# Patient Record
Sex: FEMALE | Race: OTHER | NOT HISPANIC OR LATINO
[De-identification: names, ages, dates, MRNs, and addresses within clinical notes are randomized per-mention and may not be internally consistent; named-entity substitution may affect disease eponyms.]

---

## 2018-03-13 ENCOUNTER — MESSAGE (OUTPATIENT)
Age: 68
End: 2018-03-13

## 2018-10-09 ENCOUNTER — RESULT REVIEW (OUTPATIENT)
Age: 68
End: 2018-10-09

## 2018-10-09 ENCOUNTER — INPATIENT (INPATIENT)
Facility: HOSPITAL | Age: 68
LOS: 10 days | Discharge: HOSPICE | DRG: 853 | End: 2018-10-20
Attending: INTERNAL MEDICINE | Admitting: HOSPITALIST
Payer: MEDICARE

## 2018-10-09 VITALS
DIASTOLIC BLOOD PRESSURE: 87 MMHG | WEIGHT: 199.96 LBS | HEART RATE: 106 BPM | RESPIRATION RATE: 16 BRPM | SYSTOLIC BLOOD PRESSURE: 108 MMHG | OXYGEN SATURATION: 94 % | HEIGHT: 69 IN | TEMPERATURE: 98 F

## 2018-10-09 DIAGNOSIS — R13.10 DYSPHAGIA, UNSPECIFIED: ICD-10-CM

## 2018-10-09 DIAGNOSIS — E87.2 ACIDOSIS: ICD-10-CM

## 2018-10-09 DIAGNOSIS — A41.9 SEPSIS, UNSPECIFIED ORGANISM: ICD-10-CM

## 2018-10-09 DIAGNOSIS — S82.899A OTHER FRACTURE OF UNSPECIFIED LOWER LEG, INITIAL ENCOUNTER FOR CLOSED FRACTURE: Chronic | ICD-10-CM

## 2018-10-09 DIAGNOSIS — N17.9 ACUTE KIDNEY FAILURE, UNSPECIFIED: ICD-10-CM

## 2018-10-09 DIAGNOSIS — M33.20 POLYMYOSITIS, ORGAN INVOLVEMENT UNSPECIFIED: ICD-10-CM

## 2018-10-09 DIAGNOSIS — Z29.9 ENCOUNTER FOR PROPHYLACTIC MEASURES, UNSPECIFIED: ICD-10-CM

## 2018-10-09 DIAGNOSIS — S80.12XS CONTUSION OF LEFT LOWER LEG, SEQUELA: ICD-10-CM

## 2018-10-09 DIAGNOSIS — Z98.890 OTHER SPECIFIED POSTPROCEDURAL STATES: Chronic | ICD-10-CM

## 2018-10-09 DIAGNOSIS — E87.1 HYPO-OSMOLALITY AND HYPONATREMIA: ICD-10-CM

## 2018-10-09 DIAGNOSIS — L03.90 CELLULITIS, UNSPECIFIED: ICD-10-CM

## 2018-10-09 LAB
ALBUMIN SERPL ELPH-MCNC: 4.2 G/DL — SIGNIFICANT CHANGE UP (ref 3.3–5)
ALP SERPL-CCNC: 70 U/L — SIGNIFICANT CHANGE UP (ref 40–120)
ALT FLD-CCNC: 17 U/L — SIGNIFICANT CHANGE UP (ref 10–45)
ANION GAP SERPL CALC-SCNC: 27 MMOL/L — HIGH (ref 5–17)
APPEARANCE UR: ABNORMAL
AST SERPL-CCNC: 10 U/L — SIGNIFICANT CHANGE UP (ref 10–40)
BASOPHILS # BLD AUTO: 0 K/UL — SIGNIFICANT CHANGE UP (ref 0–0.2)
BASOPHILS NFR BLD AUTO: 0.2 % — SIGNIFICANT CHANGE UP (ref 0–2)
BILIRUB SERPL-MCNC: 0.7 MG/DL — SIGNIFICANT CHANGE UP (ref 0.2–1.2)
BILIRUB UR-MCNC: NEGATIVE — SIGNIFICANT CHANGE UP
BUN SERPL-MCNC: 138 MG/DL — HIGH (ref 7–23)
CALCIUM SERPL-MCNC: 9.8 MG/DL — SIGNIFICANT CHANGE UP (ref 8.4–10.5)
CHLORIDE SERPL-SCNC: 83 MMOL/L — LOW (ref 96–108)
CK SERPL-CCNC: 51 U/L — SIGNIFICANT CHANGE UP (ref 25–170)
CO2 SERPL-SCNC: 18 MMOL/L — LOW (ref 22–31)
COLOR SPEC: YELLOW — SIGNIFICANT CHANGE UP
CREAT ?TM UR-MCNC: 116 MG/DL — SIGNIFICANT CHANGE UP
CREAT SERPL-MCNC: 3.57 MG/DL — HIGH (ref 0.5–1.3)
DIFF PNL FLD: ABNORMAL
EOSINOPHIL # BLD AUTO: 0 K/UL — SIGNIFICANT CHANGE UP (ref 0–0.5)
EOSINOPHIL NFR BLD AUTO: 0.2 % — SIGNIFICANT CHANGE UP (ref 0–6)
GAS PNL BLDV: SIGNIFICANT CHANGE UP
GLUCOSE SERPL-MCNC: 174 MG/DL — HIGH (ref 70–99)
GLUCOSE UR QL: NEGATIVE — SIGNIFICANT CHANGE UP
HCT VFR BLD CALC: 50.7 % — HIGH (ref 34.5–45)
HGB BLD-MCNC: 17.8 G/DL — HIGH (ref 11.5–15.5)
KETONES UR-MCNC: NEGATIVE — SIGNIFICANT CHANGE UP
LACTATE BLDV-MCNC: 2.2 MMOL/L — HIGH (ref 0.7–2)
LEUKOCYTE ESTERASE UR-ACNC: ABNORMAL
LYMPHOCYTES # BLD AUTO: 1.1 K/UL — SIGNIFICANT CHANGE UP (ref 1–3.3)
LYMPHOCYTES # BLD AUTO: 4.9 % — LOW (ref 13–44)
MCHC RBC-ENTMCNC: 31.4 PG — SIGNIFICANT CHANGE UP (ref 27–34)
MCHC RBC-ENTMCNC: 35.1 GM/DL — SIGNIFICANT CHANGE UP (ref 32–36)
MCV RBC AUTO: 89.7 FL — SIGNIFICANT CHANGE UP (ref 80–100)
MONOCYTES # BLD AUTO: 1.2 K/UL — HIGH (ref 0–0.9)
MONOCYTES NFR BLD AUTO: 5.2 % — SIGNIFICANT CHANGE UP (ref 2–14)
NEUTROPHILS # BLD AUTO: 21.1 K/UL — HIGH (ref 1.8–7.4)
NEUTROPHILS NFR BLD AUTO: 89.6 % — HIGH (ref 43–77)
NITRITE UR-MCNC: NEGATIVE — SIGNIFICANT CHANGE UP
OSMOLALITY UR: 453 MOS/KG — SIGNIFICANT CHANGE UP (ref 300–900)
PH UR: 6 — SIGNIFICANT CHANGE UP (ref 5–8)
PLATELET # BLD AUTO: 285 K/UL — SIGNIFICANT CHANGE UP (ref 150–400)
POTASSIUM SERPL-MCNC: 4.1 MMOL/L — SIGNIFICANT CHANGE UP (ref 3.5–5.3)
POTASSIUM SERPL-SCNC: 4.1 MMOL/L — SIGNIFICANT CHANGE UP (ref 3.5–5.3)
PROT SERPL-MCNC: 8 G/DL — SIGNIFICANT CHANGE UP (ref 6–8.3)
PROT UR-MCNC: ABNORMAL
RBC # BLD: 5.65 M/UL — HIGH (ref 3.8–5.2)
RBC # FLD: 13.4 % — SIGNIFICANT CHANGE UP (ref 10.3–14.5)
SODIUM SERPL-SCNC: 128 MMOL/L — LOW (ref 135–145)
SODIUM UR-SCNC: <20 MMOL/L — SIGNIFICANT CHANGE UP
SP GR SPEC: 1.02 — SIGNIFICANT CHANGE UP (ref 1.01–1.02)
UROBILINOGEN FLD QL: NEGATIVE — SIGNIFICANT CHANGE UP
WBC # BLD: 23.5 K/UL — HIGH (ref 3.8–10.5)
WBC # FLD AUTO: 23.5 K/UL — HIGH (ref 3.8–10.5)

## 2018-10-09 PROCEDURE — 99223 1ST HOSP IP/OBS HIGH 75: CPT | Mod: GC

## 2018-10-09 PROCEDURE — 73630 X-RAY EXAM OF FOOT: CPT | Mod: 26,LT

## 2018-10-09 PROCEDURE — 99284 EMERGENCY DEPT VISIT MOD MDM: CPT

## 2018-10-09 PROCEDURE — 73700 CT LOWER EXTREMITY W/O DYE: CPT | Mod: 26,LT

## 2018-10-09 PROCEDURE — 73590 X-RAY EXAM OF LOWER LEG: CPT | Mod: 26,LT

## 2018-10-09 PROCEDURE — 88112 CYTOPATH CELL ENHANCE TECH: CPT | Mod: 26

## 2018-10-09 PROCEDURE — 88304 TISSUE EXAM BY PATHOLOGIST: CPT | Mod: 26

## 2018-10-09 RX ORDER — FUROSEMIDE 40 MG
1 TABLET ORAL
Qty: 0 | Refills: 0 | COMMUNITY

## 2018-10-09 RX ORDER — MIDAZOLAM HYDROCHLORIDE 1 MG/ML
4 INJECTION, SOLUTION INTRAMUSCULAR; INTRAVENOUS ONCE
Qty: 0 | Refills: 0 | Status: DISCONTINUED | OUTPATIENT
Start: 2018-10-09 | End: 2018-10-09

## 2018-10-09 RX ORDER — HYDROCORTISONE 20 MG
100 TABLET ORAL EVERY 8 HOURS
Qty: 0 | Refills: 0 | Status: DISCONTINUED | OUTPATIENT
Start: 2018-10-09 | End: 2018-10-11

## 2018-10-09 RX ORDER — FENTANYL CITRATE 50 UG/ML
100 INJECTION INTRAVENOUS ONCE
Qty: 0 | Refills: 0 | Status: DISCONTINUED | OUTPATIENT
Start: 2018-10-09 | End: 2018-10-09

## 2018-10-09 RX ORDER — METOPROLOL TARTRATE 50 MG
25 TABLET ORAL
Qty: 0 | Refills: 0 | COMMUNITY

## 2018-10-09 RX ORDER — OXYCODONE HYDROCHLORIDE 5 MG/1
5 TABLET ORAL ONCE
Qty: 0 | Refills: 0 | Status: DISCONTINUED | OUTPATIENT
Start: 2018-10-09 | End: 2018-10-09

## 2018-10-09 RX ORDER — SODIUM CHLORIDE 9 MG/ML
1000 INJECTION INTRAMUSCULAR; INTRAVENOUS; SUBCUTANEOUS ONCE
Qty: 0 | Refills: 0 | Status: COMPLETED | OUTPATIENT
Start: 2018-10-09 | End: 2018-10-09

## 2018-10-09 RX ORDER — SODIUM CHLORIDE 9 MG/ML
1000 INJECTION INTRAMUSCULAR; INTRAVENOUS; SUBCUTANEOUS
Qty: 0 | Refills: 0 | Status: DISCONTINUED | OUTPATIENT
Start: 2018-10-09 | End: 2018-10-13

## 2018-10-09 RX ORDER — AZTREONAM 2 G
VIAL (EA) INJECTION
Qty: 0 | Refills: 0 | Status: DISCONTINUED | OUTPATIENT
Start: 2018-10-09 | End: 2018-10-09

## 2018-10-09 RX ORDER — CEFEPIME 1 G/1
2000 INJECTION, POWDER, FOR SOLUTION INTRAMUSCULAR; INTRAVENOUS EVERY 24 HOURS
Qty: 0 | Refills: 0 | Status: DISCONTINUED | OUTPATIENT
Start: 2018-10-09 | End: 2018-10-10

## 2018-10-09 RX ORDER — AZTREONAM 2 G
1000 VIAL (EA) INJECTION ONCE
Qty: 0 | Refills: 0 | Status: COMPLETED | OUTPATIENT
Start: 2018-10-09 | End: 2018-10-09

## 2018-10-09 RX ORDER — ACETAMINOPHEN 500 MG
650 TABLET ORAL EVERY 6 HOURS
Qty: 0 | Refills: 0 | Status: DISCONTINUED | OUTPATIENT
Start: 2018-10-09 | End: 2018-10-10

## 2018-10-09 RX ORDER — PANTOPRAZOLE SODIUM 20 MG/1
40 TABLET, DELAYED RELEASE ORAL
Qty: 0 | Refills: 0 | Status: DISCONTINUED | OUTPATIENT
Start: 2018-10-09 | End: 2018-10-13

## 2018-10-09 RX ORDER — VANCOMYCIN HCL 1 G
1000 VIAL (EA) INTRAVENOUS ONCE
Qty: 0 | Refills: 0 | Status: COMPLETED | OUTPATIENT
Start: 2018-10-09 | End: 2018-10-09

## 2018-10-09 RX ADMIN — OXYCODONE HYDROCHLORIDE 5 MILLIGRAM(S): 5 TABLET ORAL at 16:11

## 2018-10-09 RX ADMIN — SODIUM CHLORIDE 2000 MILLILITER(S): 9 INJECTION INTRAMUSCULAR; INTRAVENOUS; SUBCUTANEOUS at 14:27

## 2018-10-09 RX ADMIN — Medication 100 MILLIGRAM(S): at 15:31

## 2018-10-09 RX ADMIN — FENTANYL CITRATE 100 MICROGRAM(S): 50 INJECTION INTRAVENOUS at 17:40

## 2018-10-09 RX ADMIN — SODIUM CHLORIDE 1000 MILLILITER(S): 9 INJECTION INTRAMUSCULAR; INTRAVENOUS; SUBCUTANEOUS at 20:47

## 2018-10-09 RX ADMIN — MIDAZOLAM HYDROCHLORIDE 4 MILLIGRAM(S): 1 INJECTION, SOLUTION INTRAMUSCULAR; INTRAVENOUS at 17:40

## 2018-10-09 RX ADMIN — Medication 1000 MILLIGRAM(S): at 17:10

## 2018-10-09 RX ADMIN — OXYCODONE HYDROCHLORIDE 5 MILLIGRAM(S): 5 TABLET ORAL at 16:40

## 2018-10-09 RX ADMIN — Medication 600 MILLIGRAM(S): at 16:05

## 2018-10-09 RX ADMIN — Medication 250 MILLIGRAM(S): at 16:10

## 2018-10-09 RX ADMIN — SODIUM CHLORIDE 100 MILLILITER(S): 9 INJECTION INTRAMUSCULAR; INTRAVENOUS; SUBCUTANEOUS at 23:32

## 2018-10-09 RX ADMIN — SODIUM CHLORIDE 1000 MILLILITER(S): 9 INJECTION INTRAMUSCULAR; INTRAVENOUS; SUBCUTANEOUS at 15:30

## 2018-10-09 RX ADMIN — Medication 50 MILLIGRAM(S): at 23:32

## 2018-10-09 NOTE — ED PROVIDER NOTE - CONSTITUTIONAL, MLM
normal... Well appearing, well nourished, awake, alert, oriented to person, place, time/situation and in no apparent distress. Cushingoid appearance of face

## 2018-10-09 NOTE — ED ADULT NURSE REASSESSMENT NOTE - NS ED NURSE REASSESS COMMENT FT1
trail period without bare hugger started. pts temp 98.5 rectally. pt states she feels warm and would like to no longer use it

## 2018-10-09 NOTE — H&P ADULT - PROBLEM SELECTOR PLAN 9
- DVT prophylaxis: holding pharm VTE in the setting of large hematoma  - GI prophylaxis: Pantoprazole PO in the setting of chronic steroid use  - Diet: Dysphagia diet    Whitney Caro PGY-2  Internal medicine night admit  Pager [phone] - DVT prophylaxis: holding pharm VTE in the setting of large hematoma  - GI prophylaxis: Pantoprazole PO in the setting of chronic steroid use  - Diet: Dysphagia 3 diet    Whitney Caro PGY-2  Internal medicine night admit  Pager [phone] - Has difficulty swallowing, but not following dysphagia diet at home  - Speech and swallow ordered, continue with dysphagia diet for now, aspiration precaution

## 2018-10-09 NOTE — H&P ADULT - HISTORY OF PRESENT ILLNESS
Patient is a 68 year old woman with history of polymyositis on Prednisone daily presented with left leg weakness. Patient has a chronic wound on the L foot. She was started on antibiotics after visiting [facility] after a fall secondary to muscle weakness.     In the ED, initial vitals were Temp 36.4, , RR 16, /87. Patient's blood pressure dropped to 85/67 and she received total of 2 L NS bolus. She also received Vancomycin and Clindamycin. Patient is a 68 year old woman with history of polymyositis on Prednisone 40 mg since 03/2018 daily and chronic left leg ulcer presented with left leg weakness and LLE ulcer. Patient has been getting progressively weaker for the past 2 weeks and sustained a fall 2 weeks ago and LE ulcer has been getting progressively worse with drainage of serosanguinous fluid from blisters. She recently went to [facility] after the fall and were discharged on PO antibiotics (which the patient did not remember the name). She noticed increasing drainage today and worsening pain, thus presented to the ED today. Patient previously had debridement with skin graft application in 2015 after sustaining an ankle fracture of the LLE.      Patient denies fever, but does endorse chills. She endorses decreased appetite. No chest pain, no shortness of breath. She denies sick contact. She endorses difficulty swallowing secondary to polymyositis and has been drinking and eating in small portions. She denies nausea or vomiting or abdominal pain. She denies urinary symptoms. She has difficulty with ambulation for the past 2 weeks and has trouble sitting herself up.     In the ED, initial vitals were Temp 36.4, , RR 16, /87. Patient's blood pressure dropped to 85/67 and she received total of 2 L NS bolus. She also received 1x Vancomycin and Clindamycin. 14

## 2018-10-09 NOTE — H&P ADULT - NSHPLABSRESULTS_GEN_ALL_CORE
LABS:                        17.8   23.5  )-----------( 285      ( 09 Oct 2018 14:28 )             50.7     Hgb Trend: 17.8<--  10-09    128<L>  |  83<L>  |  138<H>  ----------------------------<  174<H>  4.1   |  18<L>  |  3.57<H>    Ca    9.8      09 Oct 2018 14:28    TPro  8.0  /  Alb  4.2  /  TBili  0.7  /  DBili  x   /  AST  10  /  ALT  17  /  AlkPhos  70  10-09    Creatinine Trend: 3.57<--        Venous Blood Gas:  10-09 @ 20:14  --/--/--/--/--  VBG Lactate: 2.2  Venous Blood Gas:  10-09 @ 14:28  7.38/32/32/19/52  VBG Lactate: 3.3 LABS:                        17.8   23.5  )-----------( 285      ( 09 Oct 2018 14:28 )             50.7     Hgb Trend: 17.8<--  10-09    128<L>  |  83<L>  |  138<H>  ----------------------------<  174<H>  4.1   |  18<L>  |  3.57<H>    Ca    9.8      09 Oct 2018 14:28    TPro  8.0  /  Alb  4.2  /  TBili  0.7  /  DBili  x   /  AST  10  /  ALT  17  /  AlkPhos  70  10-09    Creatinine Trend: 3.57<--        Venous Blood Gas:  10-09 @ 20:14  --/--/--/--/--  VBG Lactate: 2.2  Venous Blood Gas:  10-09 @ 14:28  7.38/32/32/19/52  VBG Lactate: 3.3    < from: Xray Tibia + Fibula 2 Views, Left (10.09.18 @ 14:45) >    IMPRESSION:   There is a large rounded soft tissue lesion along the posterior aspect of   the calf at the level of the distal tibia measuring 7.2 cm in the   craniocaudal dimension. There is no soft tissue gas. There is no adjacent   cortical erosions or periosteal reaction. There is diffuse swelling of   the lower extremity and foot.  No acute fracture is visualized. There are old fractures of the distal   tibia and fibula with intramedullary simone and fixation plate and screws.   There is no evidence of hardware complication. There is an old fracture   of the fifth metatarsal shaft.     < end of copied text > LABS:                        17.8   23.5  )-----------( 285      ( 09 Oct 2018 14:28 )             50.7     Hgb Trend: 17.8<--  10-09    128<L>  |  83<L>  |  138<H>  ----------------------------<  174<H>  4.1   |  18<L>  |  3.57<H>    Ca    9.8      09 Oct 2018 14:28    TPro  8.0  /  Alb  4.2  /  TBili  0.7  /  DBili  x   /  AST  10  /  ALT  17  /  AlkPhos  70  10-09    Creatinine Trend: 3.57<--        Venous Blood Gas:  10-09 @ 20:14  --/--/--/--/--  VBG Lactate: 2.2  Venous Blood Gas:  10-09 @ 14:28  7.38/32/32/19/52  VBG Lactate: 3.3    < from: Xray Tibia + Fibula 2 Views, Left (10.09.18 @ 14:45) >    IMPRESSION:   There is a large rounded soft tissue lesion along the posterior aspect of   the calf at the level of the distal tibia measuring 7.2 cm in the   craniocaudal dimension. There is no soft tissue gas. There is no adjacent   cortical erosions or periosteal reaction. There is diffuse swelling of   the lower extremity and foot.  No acute fracture is visualized. There are old fractures of the distal   tibia and fibula with intramedullary simone and fixation plate and screws.   There is no evidence of hardware complication. There is an old fracture   of the fifth metatarsal shaft.     < end of copied text >    Xray reviewed by me LABS:                        17.8   23.5  )-----------( 285      ( 09 Oct 2018 14:28 )             50.7     Hgb Trend: 17.8<--  10-09    128<L>  |  83<L>  |  138<H>  ----------------------------<  174<H>  4.1   |  18<L>  |  3.57<H>    Ca    9.8      09 Oct 2018 14:28    TPro  8.0  /  Alb  4.2  /  TBili  0.7  /  DBili  x   /  AST  10  /  ALT  17  /  AlkPhos  70  10-09    Creatinine Trend: 3.57<--        Venous Blood Gas:  10-09 @ 20:14  --/--/--/--/--  VBG Lactate: 2.2  Venous Blood Gas:  10-09 @ 14:28  7.38/32/32/19/52  VBG Lactate: 3.3    < from: Xray Tibia + Fibula 2 Views, Left (10.09.18 @ 14:45) >    IMPRESSION:   There is a large rounded soft tissue lesion along the posterior aspect of   the calf at the level of the distal tibia measuring 7.2 cm in the   craniocaudal dimension. There is no soft tissue gas. There is no adjacent   cortical erosions or periosteal reaction. There is diffuse swelling of   the lower extremity and foot.  No acute fracture is visualized. There are old fractures of the distal   tibia and fibula with intramedullary simone and fixation plate and screws.   There is no evidence of hardware complication. There is an old fracture   of the fifth metatarsal shaft.     < end of copied text >    Xrays reviewed personally  EKG reviewed-sinus tachycardia

## 2018-10-09 NOTE — ED ADULT NURSE NOTE - OBJECTIVE STATEMENT
68y femalr brought in by EMS c/o weakness. A&OPx3 and VSS. Hx of CHF. Presents 68y female brought in by EMS c/o weakness. A&Ox3 and VSS. Hx of CHF and polymyocytis. Presents to ED c/o increased weakness. Pt reports fall 2 weeks ago with seconday left leg injury. Infection develop on left leg for which antibiotics were administered. Pt presents to c/o increased swelling and wound to left leg. C/o pain 7/10. Denies taking pain medication prior to ED arrival. Denies chest pain, sob, fever/chills, n/v/d. 68y female brought in by EMS c/o weakness. A&Ox3 and VSS. Hx of CHF and polymyositis (on steroids since 2014). Presents to ED c/o increased weakness, specifically in legs. Pt reports fall 2 weeks ago. Pt sustain wound on left leg, infection develop on left leg for which antibiotics were prescribed but pt did not take. Pt presents to c/o increased swelling and wound to left leg. C/o pain 7/10. Denies taking pain medication prior to ED arrival. Wound noted to left dorsal foot. Large blood filled blister noted to medial left ankle. Entire lower left leg swollen and red with delayed cap refill. Wounds present with pus and foul odor. Pt states boyfriend has been changing the wound dressing but the pain is getting worse and pt is not unable to walk. Denies chest pain, sob, fever/chills, n/v/d.

## 2018-10-09 NOTE — ED ADULT NURSE REASSESSMENT NOTE - NS ED NURSE REASSESS COMMENT FT1
debridement of left ankle performed by surgical team with MD Lees at bedside via conscious sedation.  pt placed on cardiac monitor, end tidal monitor and crash cart, BVM and reversal meds at bedside, per hospital policy.  time out performed, per hospital policy before procedure.  pt recvd prescribed meds that was administered by MD Lees.  procedure was well tolerated.  pt currently asleep but arousable to verbal stimuli.  vss.  will continue to closely monitor. debridement of left ankle performed by surgical team with MD Lees at bedside via conscious sedation.  pt placed on cardiac monitor, end tidal monitor and crash cart, BVM and reversal meds at bedside, per hospital policy.  time out performed, per hospital policy before procedure.  pt recvd prescribed meds that was administered by MD Lees.  procedure was well tolerated.  pt currently asleep but arousable to verbal stimuli.  vss.  pls refer to paper chart for sedation flowsheet and post procedure recovery record.  will continue to closely monitor.

## 2018-10-09 NOTE — H&P ADULT - PROBLEM SELECTOR PLAN 5
- Hypotonic hyponatremia with urine Na<20 could be secondary to hypovolemia due to thrid spaces losses  - Continue with IVF for hydration, monitor BMP daily - Hypotonic hyponatremia with urine Na<20 could be secondary to hypovolemia in the setting of sepsis and decreased PO intake  - Continue with IVF for hydration, monitor BMP daily - Anion gap metabolic acidosis with AG 27 likely secondary to combination of lactate elevation as well as uremia  - Continue with IVF for hydration, monitor BMP daily

## 2018-10-09 NOTE — H&P ADULT - PROBLEM SELECTOR PLAN 8
- DVT prophylaxis: holding pharm VTE in the setting of large hematoma  - GI prophylaxis: Pantoprazole PO in the setting of chronic steroid use  - Diet: Argenis Caro PGY-2  Internal medicine night admit  Pager [phone] - Has difficulty swallowing, but not following dysphagia diet at home  - Speech and swallow ordered, continue with dysphagia diet for now, aspiration precaution - On chronic Prednisone 40 mg daily, dose unchanged in 03/2018, holding PO Prednisone  - Continue with stress dose steroid Hydrocortisone 100 mg Q8H

## 2018-10-09 NOTE — H&P ADULT - PROBLEM SELECTOR PLAN 7
- On chronic Prednisone 40 mg daily, dose unchanged in 03/2018  - Continue with stress dose steroid - On chronic Prednisone 40 mg daily, dose unchanged in 03/2018, holding PO Prednisone  - Continue with stress dose steroid Hydrocortisone 100 mg Q8H - Patient endorses decreased appetite and weakness before the fall could be secondary to secondary adrenal insufficiency in the setting of chronic steroid use  - Continue with stress dose steroid Hydrocortisone 100 mg Q8H

## 2018-10-09 NOTE — ED PROVIDER NOTE - CARE PLAN
Principal Discharge DX:	Cellulitis  Secondary Diagnosis:	Hematoma of lower limb  Secondary Diagnosis:	MAI (acute kidney injury)

## 2018-10-09 NOTE — H&P ADULT - NSHPOUTPATIENTPROVIDERS_GEN_ALL_CORE
Dr. Dante Mchugh ([facility]) Dr. Dante Mchugh (PCP)  Dr. Renae Lopez (Rheumatology [facility]) [phone]

## 2018-10-09 NOTE — H&P ADULT - PROBLEM SELECTOR PLAN 4
- Anion gap metabolic acidosis with AG 27 likely secondary to combination of lactate elevation as well as uremia  - Continue with IVF for hydration, monitor BMP daily - Cr 3.57 on admission with baseline Cr in 2015 0.5 with FeNa 0.5% likely pre-renal in the setting of severe sepsis   - Continue with IVF for hydration, urine lytes pending for FeUrea calculation  - Continue to monitor BMP daily, monitor UOP, avoid nephrotoxic agents, holding Lasix for now, bladder scan  - Consider renal consult in the am

## 2018-10-09 NOTE — H&P ADULT - FAMILY HISTORY
Family history of CVA     Sibling  Still living? Unknown  Family history of diabetes mellitus, Age at diagnosis: Age Unknown

## 2018-10-09 NOTE — ED PROVIDER NOTE - MUSCULOSKELETAL, MLM
Marked pedel edema of LE. Large plum-sized sloughing of skin on dorsum of L foot w/ some weeping. Large, black small fist-sized blister, not opened over the posterior L heel. Mild surrounding erythema. Able to slowly lift legs off bed but globally weak. UE strength 5/5

## 2018-10-09 NOTE — H&P ADULT - PROBLEM SELECTOR PLAN 3
- Cr 3.57 on admission with baseline Cr in 2015 0.5 with FeNa 0.5% likely pre-renal in the setting of severe sepsis   - Continue with IVF for hydration, urine lytes pending  - Continue to monitor BMP daily, monitor UOP, avoid nephrotoxic agents, holding lasix for now - Cr 3.57 on admission with baseline Cr in 2015 0.5 with FeNa 0.5% likely pre-renal in the setting of severe sepsis   - Continue with IVF for hydration, urine lytes pending for FeUrea calculation  - Continue to monitor BMP daily, monitor UOP, avoid nephrotoxic agents, holding Lasix for now  - Consider renal consult in the am - Cr 3.57 on admission with baseline Cr in 2015 0.5 with FeNa 0.5% likely pre-renal in the setting of severe sepsis   - Continue with IVF for hydration, urine lytes pending for FeUrea calculation  - Continue to monitor BMP daily, monitor UOP, avoid nephrotoxic agents, holding Lasix for now, bladder scan  - Consider renal consult in the am - Hematoma in the setting of recent fall due to polymyositis, now complicated by cellulitis in the LLE  - Continue with empiric antibiotics for cellulitis while pending culture  - s/p debridement of wound by Podiatry  - Continue to monitor

## 2018-10-09 NOTE — ED PROVIDER NOTE - MEDICAL DECISION MAKING DETAILS
Alan Lees (Resident): 67 y/o polymyositis, chronic steroid w/ non healing wounds over the legs presents with global weakness, foot wounds - wounds likely not healing 2/2 to the steroids - patient able to range extremities, but globally weak - will hydrate, XR to ensure no free air, podiatriy, and admit

## 2018-10-09 NOTE — H&P ADULT - PROBLEM SELECTOR PLAN 2
- Hematoma in the setting of recent fall due to polymyositis, now complicated by cellulitis in the LLE  - Continue with empiric antibiotics for cellulitis while pending culture  - s/p debridement of wound by Podiatry  - Continue to monitor for improvement - Hematoma in the setting of recent fall due to polymyositis, now complicated by cellulitis in the LLE  - Continue with empiric antibiotics for cellulitis while pending culture  - s/p debridement of wound by Podiatry  - Continue to monitor - Patient presented with leukocytosis, tachycardia meeting SIRS criteria likely source is LLE cellulitis; patient also was found to be hypotensive SBP lowest 85, responded to 2 L NS bolus  - s/p 1x Vanc and Clindamycin the ED, continue with Vancomycin for MRSA (by level), adding Cefepime for other gram positive and gram negative/anaerobe coverage while pending blood cultures  - Continue with IVF, monitor vitals Q4H, continue with stress dose steroid

## 2018-10-09 NOTE — H&P ADULT - PROBLEM SELECTOR PLAN 1
- Patient presented with leukocytosis, tachycardia meeting SIRS criteria likely source is LLE cellulitis; patient also was found to be hypotensive SBP lowest 85, responded to 2 L NS bolus  - s/p 1x Vanc and Clindamycin the ED, continue with Vancomycin (renally dosed), adding Aztreonam for gram negative coverage while pending blood cultures  - Continue with IVF, monitor vitals Q4H - Patient presented with leukocytosis, tachycardia meeting SIRS criteria likely source is LLE cellulitis; patient also was found to be hypotensive SBP lowest 85, responded to 2 L NS bolus  - s/p 1x Vanc and Clindamycin the ED, continue with Vancomycin for MRSA (renally dosed), adding Cefepime for other gram positive and gram negative coverage while pending blood cultures  - Continue with IVF, monitor vitals Q4H, continue with stress dose steroid - Patient presented with leukocytosis, tachycardia meeting SIRS criteria likely source is LLE cellulitis; patient also was found to be hypotensive SBP lowest 85, responded to 2 L NS bolus  - s/p 1x Vanc and Clindamycin the ED, continue with Vancomycin for MRSA (by level), adding Cefepime for other gram positive and gram negative/anaerobe coverage while pending blood cultures  - Continue with IVF, monitor vitals Q4H, continue with stress dose steroid -LLE cellulitis  - s/p 1x Vanc and Clindamycin the ED, continue with Vancomycin for MRSA (by level), adding Cefepime for other gram positive and gram negative/anaerobe coverage while pending blood cultures

## 2018-10-09 NOTE — ED PROVIDER NOTE - ATTENDING CONTRIBUTION TO CARE
Patient is a 67 yo F with hx of polymyositis on chronic steroids here for evaluation of leg weakness and evaluation of left lower leg and foot wound. Patient reports history of ankle fracture and recent fall about 2 weeks ago. She was treated at [facility] and was given antibiotics. She reports being discharged with antibiotics. Since discharge, wound has gotten worse and she has difficulty walking. Denies fevers.   VS noted  Gen. chronically ill patient  HEENT: EOMI, mmm  Lungs: CTAB/L no C/ W /R   CVS: tachycardic  Abd; Soft non tender, non distended   Ext: bilateral lower extremity edema, left leg with large hematoma (size of a plum), circular and malodorous. + surrounding erythema. Dorsum of left foot with sloughing skin, + serosanguinous drainage.   Neuro awake alert, decreased strength in b/l LE non focal clear speech  a/p: ? necrotic hematoma with surrounding cellulitis. Concern for extension of infection to bone. No crepitus on exam but will get XR/CT to assess for gas and extension of infection. Will c/s podiatry. Will start vancomycin/ clindamycin and send cultures. Patient to be admitted.   - Breana KINGSTON

## 2018-10-09 NOTE — H&P ADULT - NSHPPHYSICALEXAM_GEN_ALL_CORE
General: Alert and cooperative. Not in acute stress. Well developed, well nourished.   Head: Normocephalic, no mass and lesions.  Eyes: Intact visual fields. PERRLA, clear conjunctiva. EOMI, no ptosis.   Throat: Oral cavity and pharynx normal. No inflammation, swelling, exudate, or lesions. Teeth and gingiva in good general condition.  Neck: No lymphadenopathy, no masses, no thyromegaly. Carotid pulses 2+. No JVD.   Respiratory: Bilateral lung clear to auscultation, no crackles, no wheezes, no rhonchi.   Cardiovascular: S1/S2 auscultated, no murmur, or gallop. Rhythm is regular. There is no peripheral edema, cyanosis or pallor. Extremities are warm and well perfused. Capillary refill is less than 2 seconds. No carotid bruits.  Abdomen: Soft, non-tender, nondistended, no guarding or rebound tenderness. Active bowel sounds in all 4 quadrants. No hepatosplenomegaly.   Musculoskeletal: Adequately aligned spine. ROM intact spine and extremities. No joint erythema or tenderness. Normal muscular development. Normal gait.   Extremities: No significant deformity or joint abnormality. Peripheral pulses intact. No varicosities. No peripheral edema, atrophy.   Skin: Intact, no rash. Normal color, texture and turgor with no lesions or eruptions.  Neurological: AOAx4. CN2-12 grosslly intact. Strength and sensation symmetric and intact throughout. Reflexes 2+ throughout. Cerebellar testing normal.  Psychiatry: The mental examination revealed the patient was oriented to person, place, and time. The patient was able to demonstrate good judgement and reason, without hallucinations, abnormal affect or abnormal behaviors during the examination. Patient is not suicidal. General: Alert and cooperative. Speech is slow. Lethargic. On 4 L NC.   Head: Normocephalic   Eyes:  PERRLA, clear conjunctiva. EOMI, no ptosis.    Respiratory: Bilateral lung clear to auscultation, no crackles, no wheezes, no rhonchi.   Cardiovascular: S1/S2 auscultated, no murmur, or gallop. Rhythm is regular.     Abdomen: Soft, non-tender, nondistended, no guarding or rebound tenderness. Active bowel sounds in all 4 quadrants.    Extremities: In the LLE, large hematoma at posterior calf extending to the thigh, warm to touch, erythematous in the surrounding area. Left foot with wrapped dressing, tender to palpation. RLE with 3+ pitting edema extending up to knee, cool to touch.   Neurological: AOAx4. CN2-12 grosslly intact

## 2018-10-09 NOTE — H&P ADULT - ASSESSMENT
Patient is a 68 year old woman with history of polymyositis on Prednisone 40 mg since 03/2018 daily and chronic left leg ulcer presented with left leg weakness and LLE ulcer found to have severe sepsis secondary to LLE cellulitis in the setting of hematoma secondary to recent fall.

## 2018-10-09 NOTE — ED ADULT NURSE NOTE - NSIMPLEMENTINTERV_GEN_ALL_ED
Implemented All Fall with Harm Risk Interventions:  [place] to call system. Call bell, personal items and telephone within reach. Instruct patient to call for assistance. Room bathroom lighting operational. Non-slip footwear when patient is off stretcher. Physically safe environment: no spills, clutter or unnecessary equipment. Stretcher in lowest position, wheels locked, appropriate side rails in place. Provide visual cue, wrist band, yellow gown, etc. Monitor gait and stability. Monitor for mental status changes and reorient to person, place, and time. Review medications for side effects contributing to fall risk. Reinforce activity limits and safety measures with patient and family. Provide visual clues: red socks.

## 2018-10-09 NOTE — H&P ADULT - PROBLEM SELECTOR PLAN 10
- DVT prophylaxis: holding pharm VTE in the setting of large hematoma  - GI prophylaxis: Pantoprazole PO in the setting of chronic steroid use  - Diet: Dysphagia 3 diet    Whitney Caro PGY-2  Internal medicine night admit  Pager [phone]

## 2018-10-09 NOTE — CONSULT NOTE ADULT - ASSESSMENT
69 yo F w/ left leg infected hematomaexamined 67 yo F w/ left leg infected hematoma   - Pt seen and examined  - Pt given intranasal versed and fentanyl bedside   - Chloroprep used to prep the left lower leg. Hematoma evacuated, mostly coagulum expressed, sent for pathology and culture. Scrubbed with saline soaked gauze. Dressed w/ saline soaked 4x4 gauze.  - Chloroprep used to prep the anterior lateral ankle, in an area with minimal cellulitis, 18 g needled used to aspirate the joint, clean tap obtained and sent for cytology and culture. No pus noted  - Dressed w/ dry sterile dressing  - XRays and CT negative for emergent findings  - Rec IV abx  - Rec admit to medicine for IV abx and further workup for source of WBC  - D/w attending

## 2018-10-09 NOTE — ED PROVIDER NOTE - OBJECTIVE STATEMENT
67 y/o female hx of Polymyositis on steroids 40-60 daily since 2014 presents with global leg weakness. Per patient, has had a chronic wound on the L foot and leg that her boyfriend has been dressing. States has diffuse pain in the area and now is so weak that she can not walk. No f/c, CP, SOB, N/V/D. Was started on Abx after visiting [facility] last week for a fall 2/2 to weakness, but she did not take.   PMD: Alexis Mchugh

## 2018-10-09 NOTE — ED PROVIDER NOTE - SHIFT CHANGE DETAILS
Received sign-out from Dr. Toussaint awaiting labs, CT and consultation in anticipation of admission. All completed. Patient admitted.

## 2018-10-09 NOTE — H&P ADULT - PROBLEM SELECTOR PLAN 6
- Patient endorses decreased appetite and weakness before the fall could be secondary to secondary adrenal insufficiency in the setting of chronic steroid use  - Ordered for am cortisol, continue with stress dose steroid - Patient endorses decreased appetite and weakness before the fall could be secondary to secondary adrenal insufficiency in the setting of chronic steroid use  - Continue with stress dose steroid Hydrocortisone 100 mg Q8H - Hypotonic hyponatremia with urine Na<20 could be secondary to hypovolemia in the setting of sepsis and decreased PO intake  - Continue with IVF for hydration, monitor BMP daily

## 2018-10-09 NOTE — H&P ADULT - NSHPREVIEWOFSYSTEMS_GEN_ALL_CORE
Constitutional: No fever, or chills. No recent weight loss or weight gain.   HEENT: No dry eyes or eye irritation. No postnasal drip or nasal congestion.  CV: No chest pain, or palpitations. No orthopnea.   Resp: No cough, or sputum production. No shortness of breath or dyspnea on exertion.   GI: No nausea or vomiting. No diarrhea or constipation. No abdominal pain.   : No dysuria, no nocturia or increased urinary frequency.  Musculoskeletal: No back pain, no myalgias  Skin: No rash or itchiness.   Neurological: No headache or dizziness. No syncope, no weakness, numbness.  Psychiatric: Denies depressed mood.   Endocrine: No cold or heat intolerance. No dry skin.  Hematologic/Lymphatic: No anemia or bleeding problem. Constitutional: No fever, endorses chills.   HEENT:   No postnasal drip or nasal congestion.  CV: No chest pain, or palpitations. No orthopnea.   Resp: No cough, or sputum production. No shortness of breath or dyspnea on exertion.   GI: No nausea or vomiting. No diarrhea or constipation. No abdominal pain.   : No dysuria, no nocturia or increased urinary frequency.  Musculoskeletal: No back pain, no myalgias  Neurological: No headache or dizziness.    Psychiatric: Denies depressed mood.

## 2018-10-10 DIAGNOSIS — L03.116 CELLULITIS OF LEFT LOWER LIMB: ICD-10-CM

## 2018-10-10 LAB
ANION GAP SERPL CALC-SCNC: 18 MMOL/L — HIGH (ref 5–17)
BASE EXCESS BLDV CALC-SCNC: -9.7 MMOL/L — LOW (ref -2–2)
BASOPHILS # BLD AUTO: 0 K/UL — SIGNIFICANT CHANGE UP (ref 0–0.2)
BUN SERPL-MCNC: 118 MG/DL — HIGH (ref 7–23)
CA-I SERPL-SCNC: 1.04 MMOL/L — LOW (ref 1.12–1.3)
CALCIUM SERPL-MCNC: 8 MG/DL — LOW (ref 8.4–10.5)
CHLORIDE BLDV-SCNC: 107 MMOL/L — SIGNIFICANT CHANGE UP (ref 96–108)
CHLORIDE SERPL-SCNC: 103 MMOL/L — SIGNIFICANT CHANGE UP (ref 96–108)
CO2 BLDV-SCNC: 14 MMOL/L — LOW (ref 22–30)
CO2 SERPL-SCNC: 13 MMOL/L — LOW (ref 22–31)
CREAT SERPL-MCNC: 2.46 MG/DL — HIGH (ref 0.5–1.3)
EOSINOPHIL # BLD AUTO: 0 K/UL — SIGNIFICANT CHANGE UP (ref 0–0.5)
GAS PNL BLDV: 130 MMOL/L — LOW (ref 136–145)
GAS PNL BLDV: SIGNIFICANT CHANGE UP
GAS PNL BLDV: SIGNIFICANT CHANGE UP
GLUCOSE BLDV-MCNC: 158 MG/DL — HIGH (ref 70–99)
GLUCOSE SERPL-MCNC: 154 MG/DL — HIGH (ref 70–99)
HBA1C BLD-MCNC: 5.7 % — HIGH (ref 4–5.6)
HCO3 BLDV-SCNC: 13 MMOL/L — LOW (ref 21–29)
HCT VFR BLD CALC: 46.4 % — HIGH (ref 34.5–45)
HCT VFR BLDA CALC: 47 % — SIGNIFICANT CHANGE UP (ref 39–50)
HGB BLD CALC-MCNC: 15.2 G/DL — SIGNIFICANT CHANGE UP (ref 11.5–15.5)
HGB BLD-MCNC: 15.3 G/DL — SIGNIFICANT CHANGE UP (ref 11.5–15.5)
LACTATE BLDV-MCNC: 2.6 MMOL/L — HIGH (ref 0.7–2)
LACTATE BLDV-MCNC: 2.6 MMOL/L — HIGH (ref 0.7–2)
LYMPHOCYTES # BLD AUTO: 0.6 K/UL — LOW (ref 1–3.3)
LYMPHOCYTES # BLD AUTO: 7 % — LOW (ref 13–44)
MAGNESIUM SERPL-MCNC: 2.6 MG/DL — SIGNIFICANT CHANGE UP (ref 1.6–2.6)
MCHC RBC-ENTMCNC: 30.6 PG — SIGNIFICANT CHANGE UP (ref 27–34)
MCHC RBC-ENTMCNC: 32.9 GM/DL — SIGNIFICANT CHANGE UP (ref 32–36)
MCV RBC AUTO: 93 FL — SIGNIFICANT CHANGE UP (ref 80–100)
MONOCYTES # BLD AUTO: 0.4 K/UL — SIGNIFICANT CHANGE UP (ref 0–0.9)
MONOCYTES NFR BLD AUTO: 6 % — SIGNIFICANT CHANGE UP (ref 2–14)
NEUTROPHILS # BLD AUTO: 14.5 K/UL — HIGH (ref 1.8–7.4)
NEUTROPHILS NFR BLD AUTO: 72 % — SIGNIFICANT CHANGE UP (ref 43–77)
OSMOLALITY SERPL: 324 MOS/KG — HIGH (ref 275–300)
OTHER CELLS CSF MANUAL: 20 ML/DL — SIGNIFICANT CHANGE UP (ref 18–22)
PCO2 BLDV: 24 MMHG — LOW (ref 35–50)
PH BLDV: 7.37 — SIGNIFICANT CHANGE UP (ref 7.35–7.45)
PHOSPHATE SERPL-MCNC: 7.4 MG/DL — HIGH (ref 2.5–4.5)
PLATELET # BLD AUTO: 209 K/UL — SIGNIFICANT CHANGE UP (ref 150–400)
PO2 BLDV: 85 MMHG — HIGH (ref 25–45)
POTASSIUM BLDV-SCNC: 3.8 MMOL/L — SIGNIFICANT CHANGE UP (ref 3.5–5.3)
POTASSIUM SERPL-MCNC: 4.1 MMOL/L — SIGNIFICANT CHANGE UP (ref 3.5–5.3)
POTASSIUM SERPL-SCNC: 4.1 MMOL/L — SIGNIFICANT CHANGE UP (ref 3.5–5.3)
PROT ?TM UR-MCNC: 64 MG/DL — HIGH (ref 0–12)
RBC # BLD: 4.99 M/UL — SIGNIFICANT CHANGE UP (ref 3.8–5.2)
RBC # FLD: 13.9 % — SIGNIFICANT CHANGE UP (ref 10.3–14.5)
SAO2 % BLDV: 96 % — HIGH (ref 67–88)
SODIUM SERPL-SCNC: 134 MMOL/L — LOW (ref 135–145)
UUN UR-MCNC: 675 MG/DL — SIGNIFICANT CHANGE UP
VANCOMYCIN FLD-MCNC: 11.3 UG/ML — SIGNIFICANT CHANGE UP
WBC # BLD: 15.6 K/UL — HIGH (ref 3.8–10.5)
WBC # FLD AUTO: 15.6 K/UL — HIGH (ref 3.8–10.5)

## 2018-10-10 PROCEDURE — 71045 X-RAY EXAM CHEST 1 VIEW: CPT | Mod: 26

## 2018-10-10 PROCEDURE — 99233 SBSQ HOSP IP/OBS HIGH 50: CPT | Mod: GC

## 2018-10-10 RX ORDER — VANCOMYCIN HCL 1 G
1250 VIAL (EA) INTRAVENOUS EVERY 12 HOURS
Qty: 0 | Refills: 0 | Status: DISCONTINUED | OUTPATIENT
Start: 2018-10-10 | End: 2018-10-11

## 2018-10-10 RX ORDER — PIPERACILLIN AND TAZOBACTAM 4; .5 G/20ML; G/20ML
3.38 INJECTION, POWDER, LYOPHILIZED, FOR SOLUTION INTRAVENOUS EVERY 8 HOURS
Qty: 0 | Refills: 0 | Status: DISCONTINUED | OUTPATIENT
Start: 2018-10-10 | End: 2018-10-13

## 2018-10-10 RX ORDER — HYDROMORPHONE HYDROCHLORIDE 2 MG/ML
0.2 INJECTION INTRAMUSCULAR; INTRAVENOUS; SUBCUTANEOUS ONCE
Qty: 0 | Refills: 0 | Status: DISCONTINUED | OUTPATIENT
Start: 2018-10-10 | End: 2018-10-10

## 2018-10-10 RX ORDER — ACETAMINOPHEN 500 MG
975 TABLET ORAL EVERY 6 HOURS
Qty: 0 | Refills: 0 | Status: DISCONTINUED | OUTPATIENT
Start: 2018-10-10 | End: 2018-10-13

## 2018-10-10 RX ORDER — INFLUENZA VIRUS VACCINE 15; 15; 15; 15 UG/.5ML; UG/.5ML; UG/.5ML; UG/.5ML
0.5 SUSPENSION INTRAMUSCULAR ONCE
Qty: 0 | Refills: 0 | Status: DISCONTINUED | OUTPATIENT
Start: 2018-10-10 | End: 2018-10-15

## 2018-10-10 RX ORDER — TRAMADOL HYDROCHLORIDE 50 MG/1
25 TABLET ORAL EVERY 8 HOURS
Qty: 0 | Refills: 0 | Status: DISCONTINUED | OUTPATIENT
Start: 2018-10-10 | End: 2018-10-12

## 2018-10-10 RX ORDER — OXYCODONE AND ACETAMINOPHEN 5; 325 MG/1; MG/1
1 TABLET ORAL ONCE
Qty: 0 | Refills: 0 | Status: DISCONTINUED | OUTPATIENT
Start: 2018-10-10 | End: 2018-10-10

## 2018-10-10 RX ADMIN — Medication 100 MILLIGRAM(S): at 13:02

## 2018-10-10 RX ADMIN — Medication 166.67 MILLIGRAM(S): at 17:38

## 2018-10-10 RX ADMIN — HYDROMORPHONE HYDROCHLORIDE 0.2 MILLIGRAM(S): 2 INJECTION INTRAMUSCULAR; INTRAVENOUS; SUBCUTANEOUS at 10:10

## 2018-10-10 RX ADMIN — CEFEPIME 100 MILLIGRAM(S): 1 INJECTION, POWDER, FOR SOLUTION INTRAMUSCULAR; INTRAVENOUS at 00:24

## 2018-10-10 RX ADMIN — Medication 100 MILLIGRAM(S): at 21:52

## 2018-10-10 RX ADMIN — OXYCODONE AND ACETAMINOPHEN 1 TABLET(S): 5; 325 TABLET ORAL at 09:00

## 2018-10-10 RX ADMIN — PIPERACILLIN AND TAZOBACTAM 25 GRAM(S): 4; .5 INJECTION, POWDER, LYOPHILIZED, FOR SOLUTION INTRAVENOUS at 21:52

## 2018-10-10 RX ADMIN — PANTOPRAZOLE SODIUM 40 MILLIGRAM(S): 20 TABLET, DELAYED RELEASE ORAL at 06:12

## 2018-10-10 RX ADMIN — Medication 100 MILLIGRAM(S): at 00:24

## 2018-10-10 RX ADMIN — HYDROMORPHONE HYDROCHLORIDE 0.2 MILLIGRAM(S): 2 INJECTION INTRAMUSCULAR; INTRAVENOUS; SUBCUTANEOUS at 10:40

## 2018-10-10 RX ADMIN — Medication 100 MILLIGRAM(S): at 06:12

## 2018-10-10 RX ADMIN — SODIUM CHLORIDE 100 MILLILITER(S): 9 INJECTION INTRAMUSCULAR; INTRAVENOUS; SUBCUTANEOUS at 09:00

## 2018-10-10 RX ADMIN — PIPERACILLIN AND TAZOBACTAM 25 GRAM(S): 4; .5 INJECTION, POWDER, LYOPHILIZED, FOR SOLUTION INTRAVENOUS at 14:33

## 2018-10-10 RX ADMIN — OXYCODONE AND ACETAMINOPHEN 1 TABLET(S): 5; 325 TABLET ORAL at 10:03

## 2018-10-10 NOTE — PROGRESS NOTE ADULT - SUBJECTIVE AND OBJECTIVE BOX
Patient is a 68y old  Female who presents with a chief complaint of Leg weakness (09 Oct 2018 21:55)       INTERVAL HPI/OVERNIGHT EVENTS:  Patient seen and evaluated at bedside.  Pt is resting comfortable in NAD. Denies N/V/F/C.  Pain controlled when pt is still, 10 when LLE is being palpated or manipulated.    Allergies    penicillins (Unknown)    Intolerances        Vital Signs Last 24 Hrs  T(C): 36.3 (10 Oct 2018 05:14), Max: 36.9 (09 Oct 2018 23:31)  T(F): 97.4 (10 Oct 2018 05:14), Max: 98.5 (09 Oct 2018 23:31)  HR: 82 (10 Oct 2018 05:14) (78 - 106)  BP: 110/77 (10 Oct 2018 05:14) (85/67 - 128/87)  BP(mean): --  RR: 18 (10 Oct 2018 05:14) (8 - 18)  SpO2: 98% (10 Oct 2018 05:14) (94% - 100%)    LABS:                        17.8   23.5  )-----------( 285      ( 09 Oct 2018 14:28 )             50.7     10    128<L>  |  83<L>  |  138<H>  ----------------------------<  174<H>  4.1   |  18<L>  |  3.57<H>    Ca    9.8      09 Oct 2018 14:28    TPro  8.0  /  Alb  4.2  /  TBili  0.7  /  DBili  x   /  AST  10  /  ALT  17  /  AlkPhos  70  10-09      Urinalysis Basic - ( 09 Oct 2018 22:08 )    Color: Yellow / Appearance: Slightly Turbid / S.019 / pH: x  Gluc: x / Ketone: Negative  / Bili: Negative / Urobili: Negative   Blood: x / Protein: 30 mg/dL / Nitrite: Negative   Leuk Esterase: Moderate / RBC: 49 /hpf / WBC 9 /hpf   Sq Epi: x / Non Sq Epi: 8 /hpf / Bacteria: Negative      CAPILLARY BLOOD GLUCOSE          Lower Extremity Physical Exam:    Vascular: DP/PT 0/4, B/L, CFT < 3 seconds B/L, Temperature gradient warm to cool, B/L.   Neuro: Epicritic sensation  to the level of toes, B/L.  Musculoskeletal/Ortho:   Left leg: 6x6 evacuated hematoma at posteromedial distal calf w/ associated diffuse cellulitis, surrounding edema and ecchymosis, no active drainage today, hematoma evacuation to the level of superficial dermis, does not probe or tunnel, no fluctuance noted, +Malodor     Left foot: dorsolateral wound w/ mostly granular wound bed, w/ surrounding sloughing skin extending to the entire left dorsal foot,, w/ diffuse cellulitis, edema, +malodor, serosanguinous drainage,     RADIOLOGY & ADDITIONAL TESTS:  < from: CT Lower Extremity No Cont, Left (10.09.18 @ 19:43) >  PROCEDURE DATE:  10/09/2018      ******PRELIMINARY REPORT******    ******PRELIMINARY REPORT******              INTERPRETATION: extensive soft tissue edema likely cellulitis   no tracking soft tissue gas.   IM simone with no evidence of osteomyelitis. fracture line is seen in distal   tibia.      < end of copied text >

## 2018-10-10 NOTE — PROGRESS NOTE ADULT - PROBLEM SELECTOR PLAN 9
- DVT prophylaxis: holding pharm VTE in the setting of large hematoma  - GI prophylaxis: Pantoprazole PO in the setting of chronic steroid use

## 2018-10-10 NOTE — PROGRESS NOTE ADULT - SUBJECTIVE AND OBJECTIVE BOX
HIWOT SMILEY  68y  Female      Patient is a 68y old  Female Hx multiple falls, LLE ankle and leg fracture s/p ORIF w/ rods and screws, polymyositis on chronic solumedrol 40mg daily who presents with a chief complaint of Leg ulceration. Pt states 2 weeks ago had a fall w/ resulting hematoma to left calf. Subsequently developed ulceration w/ redness and swelling. Went to [facility] where she was given an unknown abx and sent home. Pt took antibiotic as prescribed but worsening swelling, redness, and pain came to ED evening of 10/9. Endorses chills but denies fever, confusion, shortness of breath, chest pain, d/c, n/v.     In ED pt found to be tachycardic w/ leukocytosis. Pt BP initially 108/87 then dropped to 85/67. 2L NS bolus, stress dose steroids, sepsis w/u started including wound Cx. Given vancomycin and clindamycin. Started on aztreonam and cefepime o/n w/ maintenance IVF.    INTERVAL HPI/OVERNIGHT EVENTS:    This am pt w/ severe pain requiring Dilaudid. Also w/ episode of choking w/ breakfast. Pt states this happens sometimes at home and is due to esophageal involvement of her polymyositis.     PHYSICAL EXAM:  Vital Signs Last 24 Hrs  T(C): 36.4 (10 Oct 2018 08:51), Max: 36.9 (09 Oct 2018 23:31)  T(F): 97.5 (10 Oct 2018 08:51), Max: 98.5 (09 Oct 2018 23:31)  HR: 100 (10 Oct 2018 10:08) (78 - 106)  BP: 106/74 (10 Oct 2018 10:08) (85/67 - 128/87)  BP(mean): --  RR: 18 (10 Oct 2018 08:51) (8 - 18)  SpO2: 96% (10 Oct 2018 08:51) (94% - 100%)  GENERAL: NAD, well-groomed, well developed  HEAD:  Atraumatic, Normocephalic, b/l facial droop  EYES: EOMI, PERRLA, conjunctiva and sclera clear  ENMT: No tonsillar erythema, exudates, or enlargement; Moist mucous membranes, Good dentition, No lesions  NECK: Supple, No JVD, Normal thyroid  NERVOUS SYSTEM:  Alert & Oriented X3, Good concentration; Motor Strength 5/5 B/L upper and lower extremities; DTRs 2+ intact and symmetric  CHEST/LUNG: Clear to auscultation bilaterally; No rales, rhonchi, wheezing, or rubs  HEART: Regular rate and rhythm; No murmurs, rubs, or gallops  ABDOMEN: Obese, Soft, Nontender, Nondistended; Bowel sounds present  EXTREMITIES:  2+ Peripheral Pulses in b/l UE, and RLE, unable to asses DP or PT pulses in LLE due to wrap, <2 sec cap refill b/l toes. LLE wrapped from proximal toes to distal leg. Area of erythema extends from wrap up to mid to distal thigh, warm and tender to palpation. Dressing w/ no obvious drainage or bleeding. RLE w/ 2+ pitting edema to knee.  LYMPH: No lymphadenopathy noted    Consultant(s) Notes Reviewed:  [x ] YES  [ ] NO  Care Discussed with Consultants/Other Providers [ x] YES  [ ] NO    Medications  MEDICATIONS  (STANDING):  cefepime   IVPB 2000 milliGRAM(s) IV Intermittent every 24 hours  hydrocortisone sodium succinate Injectable 100 milliGRAM(s) IV Push every 8 hours  influenza   Vaccine 0.5 milliLiter(s) IntraMuscular once  pantoprazole    Tablet 40 milliGRAM(s) Oral before breakfast  sodium chloride 0.9%. 1000 milliLiter(s) (100 mL/Hr) IV Continuous <Continuous>    MEDICATIONS  (PRN):  acetaminophen   Tablet .. 975 milliGRAM(s) Oral every 6 hours PRN Mild Pain (1 - 3)  traMADol 25 milliGRAM(s) Oral every 8 hours PRN Moderate Pain (4 - 6)      LABS:                          17.8   23.5  )-----------( 285      ( 09 Oct 2018 14:28 )             50.7         CAPILLARY BLOOD GLUCOSE      10-09    128<L>  |  83<L>  |  138<H>  ----------------------------<  174<H>  4.1   |  18<L>  |  3.57<H>    Ca    9.8      09 Oct 2018 14:28    TPro  8.0  /  Alb  4.2  /  TBili  0.7  /  DBili  x   /  AST  10  /  ALT  17  /  AlkPhos  70  10-09    Blood Gas Profile - Venous (10.09.18 @ 14:28)    pH, Venous: 7.38    pCO2, Venous: 32 mmHg    pO2, Venous: 32 mmHg    HCO3, Venous: 19 mmol/L    Base Excess, Venous: -4.7 mmol/L    Oxygen Saturation, Venous: 52 %    Total CO2, Venous: 20 mmol/L    Blood Gas Source Venous: Venous    Urinalysis (10.09.18 @ 22:08)    pH Urine: 6.0    Glucose Qualitative, Urine: Negative    Blood, Urine: Moderate    Color: Yellow    Urine Appearance: Slightly Turbid    Bilirubin: Negative    Ketone - Urine: Negative    Specific Gravity: 1.019    Protein, Urine: 30 mg/dL    Urobilinogen: Negative    Nitrite: Negative    Leukocyte Esterase Concentration: Moderate        RADIOLOGY & ADDITIONAL TESTS:    Imaging Personally Reviewed:  [ ] YES  [ ] NO  < from: Xray Tibia + Fibula 2 Views, Left (10.09.18 @ 14:45) >  MPRESSION:   There is a large rounded soft tissue lesion along the posterior aspect of   the calf at the level of the distal tibia measuring 7.2 cm in the   craniocaudal dimension. There is no soft tissue gas. There is no adjacent   cortical erosions or periosteal reaction. There is diffuse swelling of   the lower extremity and foot.  No acute fracture is visualized. There are old fractures of the distal   tibia and fibula with intramedullary simone and fixation plate and screws.   There is no evidence of hardware complication. There is an old fracture   of the fifth metatarsal shaft.       < end of copied text >    < from: CT Lower Extremity No Cont, Left (10.09.18 @ 19:43) >  FINDINGS:    OSSEOUS STRUCTURES: The patient is status post ORIF of the tibia with   intramedullary rods with proximal and distal fixation screws.   Additionally, there is a distal lateral plate and screw fixation of the   fibula. A single medial malleolar screw is noted. The hardware bridges   old fractures that are healed. There is moderate medial and mild lateral   knee compartment narrowing. There are chronic fractures of the base of   the fourth and proximal fifth metatarsals. No cortical erosion or   destruction is appreciated. No acute fracture is noted.  SYNOVIUM/ JOINT FLUID: There is no knee joint effusion. No large ankle   joint effusion is seen.  TENDONS: The extensor mechanism is intact. The tendons about the ankle   are normal.  MUSCLES: There is severe atrophy of the calf and distal thigh   musculature. No intramuscular edema is identified.  NEUROVASCULAR STRUCTURES: Neurovascular structures are preserved.  SUBCUTANEOUS SOFT TISSUES: There is soft tissue swelling about the calf.   No drainable fluid collection is identified. There is mild skin   irregularity along the anteromedial distal calf.    3-D reformatted imaging confirms these findings.    IMPRESSION:    Moderate circumferential soft tissue swelling with skin thickening,   nonspecific but can be seen with cellulitis. No drainable fluid   collection.  No CT evidence for osteomyelitis.  Severe calf and distal thigh muscle atrophy.    < end of copied text >      HEALTH ISSUES - PROBLEM Dx:  Cellulitis of left lower extremity: Cellulitis of left lower extremity  Dysphagia: Dysphagia  Prophylactic measure: Prophylactic measure  Polymyositis: Polymyositis  Hyponatremia: Hyponatremia  Metabolic acidosis: Metabolic acidosis  MAI (acute kidney injury): MAI (acute kidney injury)  Hematoma of left lower extremity, sequela: Hematoma of left lower extremity, sequela  Severe sepsis: Severe sepsis

## 2018-10-10 NOTE — PROGRESS NOTE ADULT - ASSESSMENT
67 yo F w/ left leg hematoma w/ associated cellulitis  - Pt seen and examined  - Dressings removed, serous drainage noted to the posterior leg dressing, however no active drainage noted from the wound. Wound appears stable, does not probe or tunnel  - Site of evacuated hematoma dressed w/ adaptic, and saline moistened gauze  - L dorsal foot wound appears mostly granular, w/ minimal drainage. Dressed w/ aquacel DSD  - ESR, CRP ordered   - Venous duplex ordered to r/o DVT in LLE, pt states she has been nonweight bearing for 2 weeks w/ erythema and edema to LLE  - XRays and CT negative for emergent findings  - Cont IV abx  - Likely no surgical management needed at this time, no indication that there is a deeper infection or hardware issue, appears to be a superficial hematoma w/ associated cellulitis to the surrounding skin/ leg  - Will follow up culture/path  - Seen w/ attending

## 2018-10-10 NOTE — PROGRESS NOTE ADULT - PROBLEM SELECTOR PLAN 6
-Pt on chronic steroids prednisone 40mg daily, now on stress dose solumedrol 100mg q8h  -Monitor for VS stability and clinical improvement, if improved can start taper in am w/ change to q12h

## 2018-10-10 NOTE — PROGRESS NOTE ADULT - PROBLEM SELECTOR PLAN 8
-Pt states uncertain of diagnosis, per PMD records PMH of polymyositis, dermatomyositis, CREST syndrome  -On prednisone 40mg daily since 3/2018  -Follows Dr. Renae Lopez ([phone]) will contact for collateral

## 2018-10-10 NOTE — PROGRESS NOTE ADULT - PROBLEM SELECTOR PLAN 7
-Pt endorses years of dysphagia but states she does better with thin liquids  -This am had choking spell w/ dysphagia breakfast, states it happens sometimes at home  -Will order CXR over concern for aspiration   -Soft diet -Pt endorses years of dysphagia but states she does better with thin liquids. likely 2/2 underlying rheumatological disorder with esophageal involvemment  -This am had choking spell w/ dysphagia breakfast, states it happens sometimes at home  -Will order CXR over concern for aspiration   -Soft diet

## 2018-10-10 NOTE — PROGRESS NOTE ADULT - PROBLEM SELECTOR PLAN 1
-Pt on chronic steroids w/ ulcer that developed in setting of hematoma following fall, subsequent worsening erythema now extending to mid/distal thigh despite outpt abx, pt found to be septic in ED (, WBC 23.5) treated w/ fluid bolus,1 dose of vanc and clindamycin then transition to aztreonam and cefepime.  -CT showing likely infection limited to soft tissue  -F/U Cx  -Tx w/ vanc and zosyn -Pt on chronic steroids w/ ulcer that developed in setting of hematoma following fall, subsequent worsening erythema now extending to mid/distal thigh despite outpt abx, pt found to be septic in ED (, WBC 23.5) treated w/ fluid bolus,1 dose of vanc and clindamycin then transition to aztreonam and cefepime.  -CT showing likely infection limited to soft tissue  -F/U Cx  -Tx w/ vanc and zosyn, renal dosing for vanc  -Check vanc level

## 2018-10-10 NOTE — PROGRESS NOTE ADULT - ASSESSMENT
Patient is a 68y old  Female Hx multiple falls, LLE ankle and leg fracture s/p ORIF w/ rods and screws, polymyositis on chronic solumedrol 40mg daily who presents w/ a LLE ulceration and worsening erythema on outpt abx found to be tachycardic w/ leukocytosis and course c/b hypotension responsive to fluids and MAI concerning for severe sepsis 2/2 cellulitis.

## 2018-10-10 NOTE — PROGRESS NOTE ADULT - ATTENDING COMMENTS
severe sepsis 2/2 cellulitis and LE ulceration/wound, seen by podiatry. No CT evidence of osteomyelitis. complicated by MAI with anion gap metabolic acidosis, hyponatremia, lactic acidosis all likely 2/2 sepsis. continue IV fluid resuscitation and broad spectrum antibiotics (vanc zosyn). ID consult. follow up podiatry recs. PT consult. Collateral from rheumatology regarding extensiveness of her disease as we noted dysphagic episode yesterday. pt reports more difficulty with solids rather than thin liquids. will consult speech/swallow to help determine safest diet for patient. if there is esophageal involvement of her polymyositis pt would likely warrant endoscopy-- can give GI referral prior to d/c.

## 2018-10-10 NOTE — PROGRESS NOTE ADULT - PROBLEM SELECTOR PLAN 2
-On presentation pt w/ , WBC 23.5, lactate 3.3 BP dropped to 85/67 requiring fluid bolus, found to have MAI w/ cr 3.57 from baseline .5  - -On presentation pt w/ , WBC 23.5, lactate 3.3 BP dropped to 85/67 requiring fluid bolus, found to have MIA w/ cr 3.57 from baseline .5  -lactate 3.3 --> 2.2   -C/w IVF, trend lactate down, vitals q4, c/w abx as above

## 2018-10-10 NOTE — PROGRESS NOTE ADULT - PROBLEM SELECTOR PLAN 4
-VBG pH 7.37, , lactate 3.3, anion gap 27 suggestive of metabolic acidosis w/ anion gap 2/2 lactic acidosis and uremia 2/2 severe sepsis   -Trend labs for improvement in BUN, lactate, anion gap  -IVF, hydration

## 2018-10-11 LAB
ALBUMIN SERPL ELPH-MCNC: 2.7 G/DL — LOW (ref 3.3–5)
ALP SERPL-CCNC: 78 U/L — SIGNIFICANT CHANGE UP (ref 40–120)
ALT FLD-CCNC: 15 U/L — SIGNIFICANT CHANGE UP (ref 10–45)
ANION GAP SERPL CALC-SCNC: 20 MMOL/L — HIGH (ref 5–17)
AST SERPL-CCNC: 12 U/L — SIGNIFICANT CHANGE UP (ref 10–40)
BASE EXCESS BLDV CALC-SCNC: -4.7 MMOL/L — LOW (ref -2–2)
BILIRUB SERPL-MCNC: 0.5 MG/DL — SIGNIFICANT CHANGE UP (ref 0.2–1.2)
BUN SERPL-MCNC: 106 MG/DL — HIGH (ref 7–23)
CA-I SERPL-SCNC: 1.09 MMOL/L — LOW (ref 1.12–1.3)
CALCIUM SERPL-MCNC: 8.4 MG/DL — SIGNIFICANT CHANGE UP (ref 8.4–10.5)
CHLORIDE BLDV-SCNC: 103 MMOL/L — SIGNIFICANT CHANGE UP (ref 96–108)
CHLORIDE SERPL-SCNC: 95 MMOL/L — LOW (ref 96–108)
CO2 BLDV-SCNC: 21 MMOL/L — LOW (ref 22–30)
CO2 SERPL-SCNC: 18 MMOL/L — LOW (ref 22–31)
CREAT SERPL-MCNC: 1.9 MG/DL — HIGH (ref 0.5–1.3)
GAS PNL BLDA: SIGNIFICANT CHANGE UP
GAS PNL BLDV: 128 MMOL/L — LOW (ref 136–145)
GAS PNL BLDV: SIGNIFICANT CHANGE UP
GAS PNL BLDV: SIGNIFICANT CHANGE UP
GLUCOSE BLDV-MCNC: 141 MG/DL — HIGH (ref 70–99)
GLUCOSE SERPL-MCNC: 135 MG/DL — HIGH (ref 70–99)
HCO3 BLDV-SCNC: 20 MMOL/L — LOW (ref 21–29)
HCT VFR BLD CALC: 39.3 % — SIGNIFICANT CHANGE UP (ref 34.5–45)
HCT VFR BLDA CALC: 42 % — SIGNIFICANT CHANGE UP (ref 39–50)
HGB BLD CALC-MCNC: 13.6 G/DL — SIGNIFICANT CHANGE UP (ref 11.5–15.5)
HGB BLD-MCNC: 13.6 G/DL — SIGNIFICANT CHANGE UP (ref 11.5–15.5)
LACTATE BLDV-MCNC: 3.1 MMOL/L — HIGH (ref 0.7–2)
MCHC RBC-ENTMCNC: 31.4 PG — SIGNIFICANT CHANGE UP (ref 27–34)
MCHC RBC-ENTMCNC: 34.6 GM/DL — SIGNIFICANT CHANGE UP (ref 32–36)
MCV RBC AUTO: 90.9 FL — SIGNIFICANT CHANGE UP (ref 80–100)
OTHER CELLS CSF MANUAL: 8 ML/DL — LOW (ref 18–22)
PCO2 BLDV: 36 MMHG — SIGNIFICANT CHANGE UP (ref 35–50)
PH BLDV: 7.36 — SIGNIFICANT CHANGE UP (ref 7.35–7.45)
PLATELET # BLD AUTO: 191 K/UL — SIGNIFICANT CHANGE UP (ref 150–400)
PO2 BLDV: 27 MMHG — SIGNIFICANT CHANGE UP (ref 25–45)
POTASSIUM BLDV-SCNC: 3.5 MMOL/L — SIGNIFICANT CHANGE UP (ref 3.5–5.3)
POTASSIUM SERPL-MCNC: 3.7 MMOL/L — SIGNIFICANT CHANGE UP (ref 3.5–5.3)
POTASSIUM SERPL-SCNC: 3.7 MMOL/L — SIGNIFICANT CHANGE UP (ref 3.5–5.3)
PROT SERPL-MCNC: 6.2 G/DL — SIGNIFICANT CHANGE UP (ref 6–8.3)
RBC # BLD: 4.32 M/UL — SIGNIFICANT CHANGE UP (ref 3.8–5.2)
RBC # FLD: 12.7 % — SIGNIFICANT CHANGE UP (ref 10.3–14.5)
SAO2 % BLDV: 42 % — LOW (ref 67–88)
SODIUM SERPL-SCNC: 133 MMOL/L — LOW (ref 135–145)
VANCOMYCIN TROUGH SERPL-MCNC: 21.6 UG/ML — HIGH (ref 10–20)
WBC # BLD: 14.8 K/UL — HIGH (ref 3.8–10.5)
WBC # FLD AUTO: 14.8 K/UL — HIGH (ref 3.8–10.5)

## 2018-10-11 PROCEDURE — 99233 SBSQ HOSP IP/OBS HIGH 50: CPT | Mod: GC

## 2018-10-11 PROCEDURE — 99223 1ST HOSP IP/OBS HIGH 75: CPT | Mod: GC

## 2018-10-11 PROCEDURE — 93971 EXTREMITY STUDY: CPT | Mod: 26

## 2018-10-11 RX ORDER — OXYCODONE HYDROCHLORIDE 5 MG/1
5 TABLET ORAL EVERY 8 HOURS
Qty: 0 | Refills: 0 | Status: DISCONTINUED | OUTPATIENT
Start: 2018-10-11 | End: 2018-10-12

## 2018-10-11 RX ORDER — HEPARIN SODIUM 5000 [USP'U]/ML
3500 INJECTION INTRAVENOUS; SUBCUTANEOUS EVERY 6 HOURS
Qty: 0 | Refills: 0 | Status: DISCONTINUED | OUTPATIENT
Start: 2018-10-11 | End: 2018-10-12

## 2018-10-11 RX ORDER — HYDROCORTISONE 20 MG
50 TABLET ORAL EVERY 8 HOURS
Qty: 0 | Refills: 0 | Status: COMPLETED | OUTPATIENT
Start: 2018-10-11 | End: 2018-10-12

## 2018-10-11 RX ORDER — SODIUM CHLORIDE 9 MG/ML
500 INJECTION, SOLUTION INTRAVENOUS ONCE
Qty: 0 | Refills: 0 | Status: COMPLETED | OUTPATIENT
Start: 2018-10-11 | End: 2018-10-11

## 2018-10-11 RX ORDER — HEPARIN SODIUM 5000 [USP'U]/ML
7500 INJECTION INTRAVENOUS; SUBCUTANEOUS EVERY 6 HOURS
Qty: 0 | Refills: 0 | Status: DISCONTINUED | OUTPATIENT
Start: 2018-10-11 | End: 2018-10-12

## 2018-10-11 RX ORDER — HEPARIN SODIUM 5000 [USP'U]/ML
INJECTION INTRAVENOUS; SUBCUTANEOUS
Qty: 25000 | Refills: 0 | Status: DISCONTINUED | OUTPATIENT
Start: 2018-10-11 | End: 2018-10-12

## 2018-10-11 RX ADMIN — Medication 975 MILLIGRAM(S): at 11:04

## 2018-10-11 RX ADMIN — OXYCODONE HYDROCHLORIDE 5 MILLIGRAM(S): 5 TABLET ORAL at 13:19

## 2018-10-11 RX ADMIN — PIPERACILLIN AND TAZOBACTAM 25 GRAM(S): 4; .5 INJECTION, POWDER, LYOPHILIZED, FOR SOLUTION INTRAVENOUS at 13:23

## 2018-10-11 RX ADMIN — Medication 50 MILLIGRAM(S): at 21:51

## 2018-10-11 RX ADMIN — Medication 975 MILLIGRAM(S): at 11:34

## 2018-10-11 RX ADMIN — OXYCODONE HYDROCHLORIDE 5 MILLIGRAM(S): 5 TABLET ORAL at 18:47

## 2018-10-11 RX ADMIN — TRAMADOL HYDROCHLORIDE 25 MILLIGRAM(S): 50 TABLET ORAL at 05:35

## 2018-10-11 RX ADMIN — SODIUM CHLORIDE 500 MILLILITER(S): 9 INJECTION, SOLUTION INTRAVENOUS at 15:35

## 2018-10-11 RX ADMIN — PIPERACILLIN AND TAZOBACTAM 25 GRAM(S): 4; .5 INJECTION, POWDER, LYOPHILIZED, FOR SOLUTION INTRAVENOUS at 05:07

## 2018-10-11 RX ADMIN — Medication 100 MILLIGRAM(S): at 05:07

## 2018-10-11 RX ADMIN — PANTOPRAZOLE SODIUM 40 MILLIGRAM(S): 20 TABLET, DELAYED RELEASE ORAL at 05:11

## 2018-10-11 RX ADMIN — PIPERACILLIN AND TAZOBACTAM 25 GRAM(S): 4; .5 INJECTION, POWDER, LYOPHILIZED, FOR SOLUTION INTRAVENOUS at 21:51

## 2018-10-11 RX ADMIN — TRAMADOL HYDROCHLORIDE 25 MILLIGRAM(S): 50 TABLET ORAL at 05:06

## 2018-10-11 RX ADMIN — Medication 50 MILLIGRAM(S): at 13:22

## 2018-10-11 NOTE — PROGRESS NOTE ADULT - PROBLEM SELECTOR PLAN 4
-VBG pH 7.37, , lactate 3.3, anion gap 27 suggestive of metabolic acidosis w/ anion gap 2/2 lactic acidosis and uremia 2/2 severe sepsis   -Trend labs for improvement in BUN, lactate, anion gap  -IVF, hydration VBG pH 7.37, , lactate 3.3, anion gap 27 suggestive of metabolic acidosis w/ anion gap 2/2 lactic acidosis and uremia 2/2 severe sepsis   -Trend labs for improvement in BUN, lactate, anion gap  -IVF, hydration

## 2018-10-11 NOTE — PROGRESS NOTE ADULT - PROBLEM SELECTOR PLAN 5
-Na 128 on admission, likely 2/2 sepsis and decreased PO intake  -Repeat BMP shows Na 134  -C/w IVF Na 128 on admission, likely 2/2 sepsis and decreased PO intake  - improving with fluids, now stable in low 130s  -C/w IVF

## 2018-10-11 NOTE — PROGRESS NOTE ADULT - PROBLEM SELECTOR PLAN 2
-On presentation pt w/ , WBC 23.5, lactate 3.3 BP dropped to 85/67 requiring fluid bolus, found to have MAI w/ cr 3.57 from baseline .5  -lactate 3.3 --> 2.2 --> 2.6  -C/w IVF, trend lactate down, vitals q4, c/w abx as above Initially with hypotension, tachyardia and leukocytosis and lactate 3.3 BP, meeting severe sepsis criteria on admission responsive to fluids and stress dose steroids, likely SSTI   - c/w IVF  - c/w abx as above  - blood btx 10/9 NGTD  - downtitrate hydrocortisone to 50 mg q8 x 3 doses  - trend lactate

## 2018-10-11 NOTE — PROGRESS NOTE ADULT - PROBLEM SELECTOR PLAN 8
-Pt states uncertain of diagnosis, per PMD records PMH of polymyositis, dermatomyositis, CREST syndrome  -On prednisone 40mg daily since 3/2018  -Follows Dr. Renae Lopez ([phone]) will contact for collateral - DVT prophylaxis: holding pharm VTE in the setting of large hematoma  - GI prophylaxis: Pantoprazole PO in the setting of chronic steroid use

## 2018-10-11 NOTE — PROGRESS NOTE ADULT - PROBLEM SELECTOR PLAN 1
-Pt on chronic steroids w/ ulcer that developed in setting of hematoma following fall, subsequent worsening erythema now extending to mid/distal thigh despite outpt abx, pt found to be septic in ED (, WBC 23.5) treated w/ fluid bolus,1 dose of vanc and clindamycin then transition to aztreonam and cefepime.  -CT showing likely infection limited to soft tissue  -F/U Cx  -Tx w/ vanc and zosyn, renal dosing for vanc  -Check vanc level Pt with traumatic LLE ulcers and hematoma following fall, now admitted for severe sepsis 2/2 superimposing cellulitis refractory to outpatient abx  - CT LLE (10/9) showing likely infection limited to soft tissue, with no overt deep tissue infection  - wound ctx on admission grown variable GNR  - Tx w/ vanc and zosyn, renal dosing for vanc  - given elevated ESR/CRP and persistent pain, will consider further eval for OM  - hba1c wnl  - ID consult Pt with traumatic LLE ulcers and hematoma following fall, now admitted for severe sepsis 2/2 superimposing cellulitis refractory to outpatient abx  - currently with erythema and swelling extending up to the mid-thigh  - CT LLE (10/9) showing likely infection limited to soft tissue, with no overt deep tissue infection  - wound ctx on admission grown variable GNR  - Tx w/ vanc and zosyn, renal dosing for vanc  - given elevated ESR/CRP and persistent pain, will consider further eval for OM  - hba1c wnl  - ID consult Pt with traumatic LLE ulcers and hematoma following fall, now admitted for severe sepsis 2/2 superimposing cellulitis and infected hematoma refractory to outpatient abx  - currently with erythema and swelling extending up to the mid-thigh  - CT LLE (10/9) showing likely infection limited to soft tissue, with no overt deep tissue infection  - wound ctx on admission grown variable GNR  - Tx w/ vanc and zosyn, renal dosing for vanc  - given elevated ESR/CRP and persistent pain, will consider further eval for OM  - hba1c wnl  - ID consult

## 2018-10-11 NOTE — PROGRESS NOTE ADULT - SUBJECTIVE AND OBJECTIVE BOX
HIWOT SMILEY  68y  Female      Patient is a 68y old  Female Hx multiple falls, LLE ankle and leg fracture s/p ORIF w/ rods and screws, polymyositis on chronic solumedrol 40mg daily who presents with a chief complaint of Leg ulceration and erythema.    INTERVAL HPI/OVERNIGHT EVENTS:    No events o/n. This am pt w/ improved pain in LLE. Slept well and had soft BM this am. Denies f/c/n/v, dizziness, confusion, CP, SOB, dysuria, new rash, or pruritis.     PHYSICAL EXAM:  Vital Signs Last 24 Hrs  T(C): 36.4 (10 Oct 2018 08:51), Max: 36.9 (09 Oct 2018 23:31)  T(F): 97.5 (10 Oct 2018 08:51), Max: 98.5 (09 Oct 2018 23:31)  HR: 100 (10 Oct 2018 10:08) (78 - 106)  BP: 106/74 (10 Oct 2018 10:08) (85/67 - 128/87)  BP(mean): --  RR: 18 (10 Oct 2018 08:51) (8 - 18)  SpO2: 96% (10 Oct 2018 08:51) (94% - 100%)  GENERAL: NAD, well-groomed, well developed  HEAD:  Atraumatic, Normocephalic, b/l facial droop  EYES: EOMI, PERRLA, conjunctiva and sclera clear  ENMT: No tonsillar erythema, exudates, or enlargement; Moist mucous membranes, Good dentition, No lesions  NECK: Supple, No JVD, Normal thyroid  NERVOUS SYSTEM:  Alert & Oriented X3, Good concentration; Motor Strength 4/5 B/L upper and lower extremities; DTRs 2+ intact and symmetric  CHEST/LUNG: Clear to auscultation bilaterally; No rales, rhonchi, wheezing, or rubs  HEART: Regular rate and rhythm; No murmurs, rubs, or gallops  ABDOMEN: Obese, Soft, Nontender, Nondistended; Bowel sounds present  EXTREMITIES:  2+ Peripheral Pulses in b/l UE, and RLE, unable to asses DP or PT pulses in LLE due to wrap, <2 sec cap refill b/l toes. LLE wrapped from proximal toes to distal leg. Area of erythema extends from wrap up to mid to distal thigh, warm and tender to palpation. Dressing w/ no obvious drainage or bleeding. RLE w/ 2+ pitting edema to knee.  LYMPH: No lymphadenopathy noted    Consultant(s) Notes Reviewed:  [x ] YES  [ ] NO  Care Discussed with Consultants/Other Providers [ x] YES  [ ] NO    Medications  MEDICATIONS  (STANDING):  cefepime   IVPB 2000 milliGRAM(s) IV Intermittent every 24 hours  hydrocortisone sodium succinate Injectable 100 milliGRAM(s) IV Push every 8 hours  influenza   Vaccine 0.5 milliLiter(s) IntraMuscular once  pantoprazole    Tablet 40 milliGRAM(s) Oral before breakfast  sodium chloride 0.9%. 1000 milliLiter(s) (100 mL/Hr) IV Continuous <Continuous>    MEDICATIONS  (PRN):  acetaminophen   Tablet .. 975 milliGRAM(s) Oral every 6 hours PRN Mild Pain (1 - 3)  traMADol 25 milliGRAM(s) Oral every 8 hours PRN Moderate Pain (4 - 6)      LABS:                                     15.3   15.6  )-----------( 209      ( 10 Oct 2018 11:59 )             46.4   10-10    134<L>  |  103  |  118<H>  ----------------------------<  154<H>  4.1   |  13<L>  |  2.46<H>    Ca    8.0<L>      10 Oct 2018 11:59  Phos  7.4     10-10  Mg     2.6     10-10    TPro  8.0  /  Alb  4.2  /  TBili  0.7  /  DBili  x   /  AST  10  /  ALT  17  /  AlkPhos  70  10-09    Blood Gas Profile - Venous (10.10.18 @ 12:02)    pH, Venous: 7.37    pCO2, Venous: 24 mmHg    pO2, Venous: 85 mmHg    HCO3, Venous: 13 mmol/L    Base Excess, Venous: -9.7 mmol/L    Oxygen Saturation, Venous: 96 %    Total CO2, Venous: 14 mmol/L    Blood Gas Source Venous: Venous            CAPILLARY BLOOD GLUCOSE      RADIOLOGY & ADDITIONAL TESTS:  Imaging Personally Reviewed:  [ ] YES  [ ] NO  < from: Xray Chest 1 View- PORTABLE-Urgent (10.10.18 @ 11:43) >  FINDINGS:     No focal lung consolidations, pleural effusions or pneumothorax.  Cardiomediastinal silhouette is normal.      IMPRESSION:     No consolidation.    < end of copied text >      < from: Xray Tibia + Fibula 2 Views, Left (10.09.18 @ 14:45) >  MPRESSION:   There is a large rounded soft tissue lesion along the posterior aspect of   the calf at the level of the distal tibia measuring 7.2 cm in the   craniocaudal dimension. There is no soft tissue gas. There is no adjacent   cortical erosions or periosteal reaction. There is diffuse swelling of   the lower extremity and foot.  No acute fracture is visualized. There are old fractures of the distal   tibia and fibula with intramedullary simone and fixation plate and screws.   There is no evidence of hardware complication. There is an old fracture   of the fifth metatarsal shaft.       < end of copied text >    < from: CT Lower Extremity No Cont, Left (10.09.18 @ 19:43) >  FINDINGS:    OSSEOUS STRUCTURES: The patient is status post ORIF of the tibia with   intramedullary rods with proximal and distal fixation screws.   Additionally, there is a distal lateral plate and screw fixation of the   fibula. A single medial malleolar screw is noted. The hardware bridges   old fractures that are healed. There is moderate medial and mild lateral   knee compartment narrowing. There are chronic fractures of the base of   the fourth and proximal fifth metatarsals. No cortical erosion or   destruction is appreciated. No acute fracture is noted.  SYNOVIUM/ JOINT FLUID: There is no knee joint effusion. No large ankle   joint effusion is seen.  TENDONS: The extensor mechanism is intact. The tendons about the ankle   are normal.  MUSCLES: There is severe atrophy of the calf and distal thigh   musculature. No intramuscular edema is identified.  NEUROVASCULAR STRUCTURES: Neurovascular structures are preserved.  SUBCUTANEOUS SOFT TISSUES: There is soft tissue swelling about the calf.   No drainable fluid collection is identified. There is mild skin   irregularity along the anteromedial distal calf.    3-D reformatted imaging confirms these findings.    IMPRESSION:    Moderate circumferential soft tissue swelling with skin thickening,   nonspecific but can be seen with cellulitis. No drainable fluid   collection.  No CT evidence for osteomyelitis.  Severe calf and distal thigh muscle atrophy.    < end of copied text >      HEALTH ISSUES - PROBLEM Dx:  Cellulitis of left lower extremity: Cellulitis of left lower extremity  Dysphagia: Dysphagia  Prophylactic measure: Prophylactic measure  Polymyositis: Polymyositis  Hyponatremia: Hyponatremia  Metabolic acidosis: Metabolic acidosis  MAI (acute kidney injury): MAI (acute kidney injury)  Hematoma of left lower extremity, sequela: Hematoma of left lower extremity, sequela  Severe sepsis: Severe sepsis

## 2018-10-11 NOTE — PROGRESS NOTE ADULT - PROBLEM SELECTOR PLAN 3
-Likely prerenal 2/2 sepsis and poor PO intake  -Trend Cr for improvement  -C/w IVF, hydration Likely prerenal 2/2 sepsis and poor PO intake  -Trend Cr for improvement  -C/w IVF, hydration

## 2018-10-11 NOTE — CONSULT NOTE ADULT - ASSESSMENT
Patient is a 68y old  Female Hx multiple falls, LLE ankle and leg fracture s/p ORIF w/ rods and screws, polymyositis on chronic solumedrol 40mg daily who presents w/ a LLE ulceration and worsening erythema on outpt abx found to be tachycardic w/ leukocytosis and course c/b hypotension responsive to fluids and MAI concerning for severe sepsis 2/2 cellulitis.    Suggest:  Continue IV Vancomycin per level   Continue IV zosyn 3.375 q8hrs as long as creatinine clearance is >20  waiting for speciation of GNR in culture  MRI without contrast if not contraindicated   LE US looking for DVT vs abscess   follow up on blood cultures   Case discussed with medical residents

## 2018-10-11 NOTE — PROGRESS NOTE ADULT - PROBLEM SELECTOR PLAN 6
-Pt on chronic steroids prednisone 40mg daily, now on stress dose solumedrol 100mg q8h  -Monitor for VS stability and clinical improvement, if improved can start taper in am w/ change to q12h Pt endorses years of dysphagia but states she does better with thin liquids. 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

## 2018-10-11 NOTE — PHYSICAL THERAPY INITIAL EVALUATION ADULT - ADDITIONAL COMMENTS
Pt lives alone in a private house with 4 steps and 1 HR to neg. Pt amb short distances w/ RW. Pt has a w/c, RW and commode. Pt states that she does not leave the house b/c she can't negotiate the steps. Pt required assistance PTA. Pt states that her BF helped.

## 2018-10-11 NOTE — PROGRESS NOTE ADULT - ASSESSMENT
69 yo F w/ left leg hematoma w/ associated cellulitis  - Pt seen and examined  - Dressings removed, saturated w/ serous fluid, Wound appears stable, does not probe or tunnel, no signs of fluctuance  - Site of evacuated hematoma dressed w/ adaptic, aquacel,   - L dorsal foot wound appears mostly granular, w/ minimal drainage. Dressed w/ aquacel DSD  - Awaiting duplex to r/o DVT in LLE  - Pt states that she has not ambulated outdoors in >2 years, she also states that in the past 2 weeks since her fall she has not left the couch in her house, states her boyfriend helps w/ cooking cleaning, and daily activities  - XRays and CT negative for emergent findings  - Cont IV abx  - Likely no surgical management needed at this time, no indication that there is a deeper infection or hardware issue, appears to be a superficial hematoma w/ associated cellulitis to the surrounding skin/ leg  - Awaiting pathology, and cytology data  - Rec ID consult for long term abx plan  - Seen w/ attending

## 2018-10-11 NOTE — PROGRESS NOTE ADULT - PROBLEM SELECTOR PLAN 7
-Pt endorses years of dysphagia but states she does better with thin liquids. 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 Renae Lopez ([phone]) will contact for collateral -Pt states uncertain of diagnosis, per PMD records PMH of polymyositis, dermatomyositis, CREST syndrome  -On prednisone 40mg daily since 3/2018- now tapering stress dose steroids in setting of sepsis.  -Follows Dr. Renae Lopez ([phone]) will contact for collateral

## 2018-10-11 NOTE — PROGRESS NOTE ADULT - SUBJECTIVE AND OBJECTIVE BOX
Patient is a 68y old  Female who presents with a chief complaint of Leg weakness (11 Oct 2018 08:31)       INTERVAL HPI/OVERNIGHT EVENTS:  Patient seen and evaluated at bedside.  Pt is resting comfortable in NAD. Denies N/V/F/C.      Allergies    penicillins (Unknown)    Intolerances        Vital Signs Last 24 Hrs  T(C): 36.4 (11 Oct 2018 08:52), Max: 36.9 (10 Oct 2018 13:12)  T(F): 97.6 (11 Oct 2018 08:52), Max: 98.5 (10 Oct 2018 13:12)  HR: 81 (11 Oct 2018 08:52) (80 - 85)  BP: 115/79 (11 Oct 2018 08:52) (110/78 - 118/79)  BP(mean): --  RR: 18 (11 Oct 2018 08:52) (18 - 20)  SpO2: 99% (11 Oct 2018 08:52) (94% - 99%)    LABS:                        15.3   15.6  )-----------( 209      ( 10 Oct 2018 11:59 )             46.4     10-10    134<L>  |  103  |  118<H>  ----------------------------<  154<H>  4.1   |  13<L>  |  2.46<H>    Ca    8.0<L>      10 Oct 2018 11:59  Phos  7.4     10-10  Mg     2.6     10-10    TPro  8.0  /  Alb  4.2  /  TBili  0.7  /  DBili  x   /  AST  10  /  ALT  17  /  AlkPhos  70  10-09      Urinalysis Basic - ( 09 Oct 2018 22:08 )    Color: Yellow / Appearance: Slightly Turbid / S.019 / pH: x  Gluc: x / Ketone: Negative  / Bili: Negative / Urobili: Negative   Blood: x / Protein: 30 mg/dL / Nitrite: Negative   Leuk Esterase: Moderate / RBC: 49 /hpf / WBC 9 /hpf   Sq Epi: x / Non Sq Epi: 8 /hpf / Bacteria: Negative      CAPILLARY BLOOD GLUCOSE          Lower Extremity Physical Exam:    Vascular: DP/PT 0/4, B/L, CFT < 3 seconds B/L, Temperature gradient cool, B/L.   Neuro: Epicritic sensation  to the level of toes, B/L  Musculoskeletal/Ortho:   Left leg: 6x6 evacuated hematoma at posteromedial distal calf w/ associated diffuse cellulitis, surrounding edema and ecchymosis, draining serous fluid, hematoma evacuation to the level of superficial dermis, does not probe or tunnel, no fluctuance noted, +Malodor     Left foot: dorsolateral wound w/ mostly granular wound bed, w/ surrounding sloughing skin extending to the entire left dorsal foot,, w/ diffuse cellulitis, edema, +malodor, serosanguinous drainage,     RADIOLOGY & ADDITIONAL TESTS:  < from: CT Lower Extremity No Cont, Left (10.09.18 @ 19:43) >  EXAM:  CT LWR EXT LT                            PROCEDURE DATE:  10/09/2018            INTERPRETATION:  CT LWR EXT LT dated 10/9/2018 7:43 PM     INDICATION: Left lower extremity pain and redness for one week.    COMPARISON: Tibia and fibula radiographs dated 10/90/18 and foot MRI   dated 10/9/2018    TECHNIQUE: CT imaging of the lower extremity was performed. The data was   reformatted in the axial, coronal, and sagittal planes. Additionally, 3-D   reformatted imaging was created at a separateworkstation.    FINDINGS:    OSSEOUS STRUCTURES: The patient is status post ORIF of the tibia with   intramedullary rods with proximal and distal fixation screws.   Additionally, there is a distal lateral plate and screw fixation of the   fibula. A single medial malleolar screw is noted. The hardware bridges   old fractures that are healed. There is moderate medial and mild lateral   knee compartment narrowing. There are chronic fractures of the base of   the fourth and proximal fifth metatarsals. No cortical erosion or   destruction is appreciated. No acute fracture is noted.  SYNOVIUM/ JOINT FLUID: There is no knee joint effusion. No large ankle   joint effusion is seen.  TENDONS: The extensor mechanism is intact. The tendons about the ankle   are normal.  MUSCLES: There is severe atrophy of the calf and distal thigh   musculature. No intramuscular edema is identified.  NEUROVASCULAR STRUCTURES: Neurovascular structures are preserved.  SUBCUTANEOUS SOFT TISSUES: There is soft tissue swelling about the calf.   No drainable fluid collection is identified. There is mild skin   irregularity along the anteromedial distal calf.    3-D reformatted imaging confirms these findings.    IMPRESSION:    Moderate circumferential soft tissue swelling with skin thickening,   nonspecific but can be seen with cellulitis. No drainable fluid   collection.  No CT evidence for osteomyelitis.  Severe calf and distal thigh muscle atrophy.    < end of copied text >

## 2018-10-11 NOTE — PROGRESS NOTE ADULT - ATTENDING COMMENTS
severe sepsis 2/2 cellulitis, infected hematoma in setting of LE ulceration/wound, podiatry following for wound care. No CT evidence of osteomyelitis. complicated by MAI with anion gap metabolic acidosis, hyponatremia, lactic acidosis all likely 2/2 sepsis and now improving. continue IV fluid resuscitation and broad spectrum antibiotics (vanc zosyn). ID consult for assistance in antibiotics choices (wound cx appears polymicrobial) and help assess risk of OM. follow up podiatry recs. PT consult. pt will need home care following discharge. severe sepsis 2/2 cellulitis, infected hematoma in setting of LE ulceration/wound, podiatry following for wound care. No CT evidence of osteomyelitis. complicated by MAI with anion gap metabolic acidosis, hyponatremia, lactic acidosis all likely 2/2 sepsis and now improving. continue IV fluid resuscitation and broad spectrum antibiotics (vanc zosyn). ID consult for assistance in antibiotics choices (wound cx appears to have GN rods) and help assess risk of OM. follow up podiatry recs. PT consult. LE doppler to r/o DVT. pt will need home care following discharge.

## 2018-10-12 ENCOUNTER — RESULT REVIEW (OUTPATIENT)
Age: 68
End: 2018-10-12

## 2018-10-12 DIAGNOSIS — Z71.89 OTHER SPECIFIED COUNSELING: ICD-10-CM

## 2018-10-12 LAB
ALBUMIN SERPL ELPH-MCNC: 2.1 G/DL — LOW (ref 3.3–5)
ALP SERPL-CCNC: 87 U/L — SIGNIFICANT CHANGE UP (ref 40–120)
ALT FLD-CCNC: 16 U/L — SIGNIFICANT CHANGE UP (ref 10–45)
ANION GAP SERPL CALC-SCNC: 18 MMOL/L — HIGH (ref 5–17)
ANION GAP SERPL CALC-SCNC: 21 MMOL/L — HIGH (ref 5–17)
APTT BLD: 178.1 SEC — CRITICAL HIGH (ref 27.5–37.4)
APTT BLD: 26.1 SEC — LOW (ref 27.5–37.4)
APTT BLD: 42.4 SEC — HIGH (ref 27.5–37.4)
AST SERPL-CCNC: 13 U/L — SIGNIFICANT CHANGE UP (ref 10–40)
BILIRUB SERPL-MCNC: 0.4 MG/DL — SIGNIFICANT CHANGE UP (ref 0.2–1.2)
BLD GP AB SCN SERPL QL: NEGATIVE — SIGNIFICANT CHANGE UP
BUN SERPL-MCNC: 100 MG/DL — HIGH (ref 7–23)
BUN SERPL-MCNC: 95 MG/DL — HIGH (ref 7–23)
CALCIUM SERPL-MCNC: 8.1 MG/DL — LOW (ref 8.4–10.5)
CALCIUM SERPL-MCNC: 8.8 MG/DL — SIGNIFICANT CHANGE UP (ref 8.4–10.5)
CHLORIDE SERPL-SCNC: 100 MMOL/L — SIGNIFICANT CHANGE UP (ref 96–108)
CHLORIDE SERPL-SCNC: 101 MMOL/L — SIGNIFICANT CHANGE UP (ref 96–108)
CO2 SERPL-SCNC: 10 MMOL/L — CRITICAL LOW (ref 22–31)
CO2 SERPL-SCNC: 15 MMOL/L — LOW (ref 22–31)
CREAT SERPL-MCNC: 1.7 MG/DL — HIGH (ref 0.5–1.3)
CREAT SERPL-MCNC: 2 MG/DL — HIGH (ref 0.5–1.3)
CRP SERPL-MCNC: 31.36 MG/DL — HIGH (ref 0–0.4)
GAS PNL BLDA: SIGNIFICANT CHANGE UP
GLUCOSE SERPL-MCNC: 109 MG/DL — HIGH (ref 70–99)
GLUCOSE SERPL-MCNC: 294 MG/DL — HIGH (ref 70–99)
HCT VFR BLD CALC: 33.2 % — LOW (ref 34.5–45)
HCT VFR BLD CALC: 37.7 % — SIGNIFICANT CHANGE UP (ref 34.5–45)
HGB BLD-MCNC: 11.6 G/DL — SIGNIFICANT CHANGE UP (ref 11.5–15.5)
HGB BLD-MCNC: 12.7 G/DL — SIGNIFICANT CHANGE UP (ref 11.5–15.5)
INR BLD: 1.24 RATIO — HIGH (ref 0.88–1.16)
MAGNESIUM SERPL-MCNC: 2.6 MG/DL — SIGNIFICANT CHANGE UP (ref 1.6–2.6)
MCHC RBC-ENTMCNC: 30.4 PG — SIGNIFICANT CHANGE UP (ref 27–34)
MCHC RBC-ENTMCNC: 32 PG — SIGNIFICANT CHANGE UP (ref 27–34)
MCHC RBC-ENTMCNC: 33.7 GM/DL — SIGNIFICANT CHANGE UP (ref 32–36)
MCHC RBC-ENTMCNC: 35.1 GM/DL — SIGNIFICANT CHANGE UP (ref 32–36)
MCV RBC AUTO: 90 FL — SIGNIFICANT CHANGE UP (ref 80–100)
MCV RBC AUTO: 91.2 FL — SIGNIFICANT CHANGE UP (ref 80–100)
PHOSPHATE SERPL-MCNC: 6.8 MG/DL — HIGH (ref 2.5–4.5)
PLATELET # BLD AUTO: 225 K/UL — SIGNIFICANT CHANGE UP (ref 150–400)
PLATELET # BLD AUTO: 275 K/UL — SIGNIFICANT CHANGE UP (ref 150–400)
POTASSIUM SERPL-MCNC: 3.4 MMOL/L — LOW (ref 3.5–5.3)
POTASSIUM SERPL-MCNC: 4 MMOL/L — SIGNIFICANT CHANGE UP (ref 3.5–5.3)
POTASSIUM SERPL-SCNC: 3.4 MMOL/L — LOW (ref 3.5–5.3)
POTASSIUM SERPL-SCNC: 4 MMOL/L — SIGNIFICANT CHANGE UP (ref 3.5–5.3)
PROT SERPL-MCNC: 5.1 G/DL — LOW (ref 6–8.3)
PROTHROM AB SERPL-ACNC: 13.4 SEC — HIGH (ref 9.8–12.7)
RBC # BLD: 3.64 M/UL — LOW (ref 3.8–5.2)
RBC # BLD: 4.19 M/UL — SIGNIFICANT CHANGE UP (ref 3.8–5.2)
RBC # FLD: 12.5 % — SIGNIFICANT CHANGE UP (ref 10.3–14.5)
RBC # FLD: 13.1 % — SIGNIFICANT CHANGE UP (ref 10.3–14.5)
RH IG SCN BLD-IMP: POSITIVE — SIGNIFICANT CHANGE UP
SODIUM SERPL-SCNC: 131 MMOL/L — LOW (ref 135–145)
SODIUM SERPL-SCNC: 134 MMOL/L — LOW (ref 135–145)
VANCOMYCIN TROUGH SERPL-MCNC: 17.2 UG/ML — SIGNIFICANT CHANGE UP (ref 10–20)
WBC # BLD: 13.6 K/UL — HIGH (ref 3.8–10.5)
WBC # BLD: 18.7 K/UL — HIGH (ref 3.8–10.5)
WBC # FLD AUTO: 13.6 K/UL — HIGH (ref 3.8–10.5)
WBC # FLD AUTO: 18.7 K/UL — HIGH (ref 3.8–10.5)

## 2018-10-12 PROCEDURE — 99497 ADVNCD CARE PLAN 30 MIN: CPT

## 2018-10-12 PROCEDURE — 71045 X-RAY EXAM CHEST 1 VIEW: CPT | Mod: 26

## 2018-10-12 PROCEDURE — 88312 SPECIAL STAINS GROUP 1: CPT | Mod: 26

## 2018-10-12 PROCEDURE — 88305 TISSUE EXAM BY PATHOLOGIST: CPT | Mod: 26

## 2018-10-12 PROCEDURE — 99232 SBSQ HOSP IP/OBS MODERATE 35: CPT

## 2018-10-12 PROCEDURE — 99233 SBSQ HOSP IP/OBS HIGH 50: CPT | Mod: GC

## 2018-10-12 RX ORDER — HYDROMORPHONE HYDROCHLORIDE 2 MG/ML
1 INJECTION INTRAMUSCULAR; INTRAVENOUS; SUBCUTANEOUS EVERY 4 HOURS
Qty: 0 | Refills: 0 | Status: DISCONTINUED | OUTPATIENT
Start: 2018-10-12 | End: 2018-10-12

## 2018-10-12 RX ORDER — HYDROCORTISONE 20 MG
100 TABLET ORAL EVERY 8 HOURS
Qty: 0 | Refills: 0 | Status: DISCONTINUED | OUTPATIENT
Start: 2018-10-12 | End: 2018-10-13

## 2018-10-12 RX ORDER — HYDROCORTISONE 20 MG
50 TABLET ORAL EVERY 12 HOURS
Qty: 0 | Refills: 0 | Status: DISCONTINUED | OUTPATIENT
Start: 2018-10-12 | End: 2018-10-12

## 2018-10-12 RX ORDER — ENOXAPARIN SODIUM 100 MG/ML
90 INJECTION SUBCUTANEOUS
Qty: 0 | Refills: 0 | Status: DISCONTINUED | OUTPATIENT
Start: 2018-10-12 | End: 2018-10-12

## 2018-10-12 RX ORDER — HYDROMORPHONE HYDROCHLORIDE 2 MG/ML
2 INJECTION INTRAMUSCULAR; INTRAVENOUS; SUBCUTANEOUS
Qty: 0 | Refills: 0 | Status: DISCONTINUED | OUTPATIENT
Start: 2018-10-12 | End: 2018-10-13

## 2018-10-12 RX ORDER — SODIUM CHLORIDE 9 MG/ML
1000 INJECTION INTRAMUSCULAR; INTRAVENOUS; SUBCUTANEOUS
Qty: 0 | Refills: 0 | Status: DISCONTINUED | OUTPATIENT
Start: 2018-10-12 | End: 2018-10-14

## 2018-10-12 RX ORDER — HYDROMORPHONE HYDROCHLORIDE 2 MG/ML
1 INJECTION INTRAMUSCULAR; INTRAVENOUS; SUBCUTANEOUS EVERY 4 HOURS
Qty: 0 | Refills: 0 | Status: DISCONTINUED | OUTPATIENT
Start: 2018-10-12 | End: 2018-10-14

## 2018-10-12 RX ORDER — HYDROMORPHONE HYDROCHLORIDE 2 MG/ML
1 INJECTION INTRAMUSCULAR; INTRAVENOUS; SUBCUTANEOUS ONCE
Qty: 0 | Refills: 0 | Status: DISCONTINUED | OUTPATIENT
Start: 2018-10-12 | End: 2018-10-12

## 2018-10-12 RX ORDER — ONDANSETRON 8 MG/1
4 TABLET, FILM COATED ORAL EVERY 6 HOURS
Qty: 0 | Refills: 0 | Status: DISCONTINUED | OUTPATIENT
Start: 2018-10-12 | End: 2018-10-17

## 2018-10-12 RX ADMIN — HEPARIN SODIUM 1700 UNIT(S)/HR: 5000 INJECTION INTRAVENOUS; SUBCUTANEOUS at 01:04

## 2018-10-12 RX ADMIN — PIPERACILLIN AND TAZOBACTAM 25 GRAM(S): 4; .5 INJECTION, POWDER, LYOPHILIZED, FOR SOLUTION INTRAVENOUS at 14:20

## 2018-10-12 RX ADMIN — OXYCODONE HYDROCHLORIDE 5 MILLIGRAM(S): 5 TABLET ORAL at 05:49

## 2018-10-12 RX ADMIN — HYDROMORPHONE HYDROCHLORIDE 1 MILLIGRAM(S): 2 INJECTION INTRAMUSCULAR; INTRAVENOUS; SUBCUTANEOUS at 09:19

## 2018-10-12 RX ADMIN — OXYCODONE HYDROCHLORIDE 5 MILLIGRAM(S): 5 TABLET ORAL at 05:19

## 2018-10-12 RX ADMIN — HYDROMORPHONE HYDROCHLORIDE 2 MILLIGRAM(S): 2 INJECTION INTRAMUSCULAR; INTRAVENOUS; SUBCUTANEOUS at 20:06

## 2018-10-12 RX ADMIN — Medication 100 MILLIGRAM(S): at 23:16

## 2018-10-12 RX ADMIN — HEPARIN SODIUM 0 UNIT(S)/HR: 5000 INJECTION INTRAVENOUS; SUBCUTANEOUS at 09:07

## 2018-10-12 RX ADMIN — HYDROMORPHONE HYDROCHLORIDE 1 MILLIGRAM(S): 2 INJECTION INTRAMUSCULAR; INTRAVENOUS; SUBCUTANEOUS at 11:50

## 2018-10-12 RX ADMIN — HYDROMORPHONE HYDROCHLORIDE 2 MILLIGRAM(S): 2 INJECTION INTRAMUSCULAR; INTRAVENOUS; SUBCUTANEOUS at 20:36

## 2018-10-12 RX ADMIN — Medication 50 MILLIGRAM(S): at 05:20

## 2018-10-12 RX ADMIN — HYDROMORPHONE HYDROCHLORIDE 1 MILLIGRAM(S): 2 INJECTION INTRAMUSCULAR; INTRAVENOUS; SUBCUTANEOUS at 23:53

## 2018-10-12 RX ADMIN — SODIUM CHLORIDE 100 MILLILITER(S): 9 INJECTION INTRAMUSCULAR; INTRAVENOUS; SUBCUTANEOUS at 20:24

## 2018-10-12 RX ADMIN — PANTOPRAZOLE SODIUM 40 MILLIGRAM(S): 20 TABLET, DELAYED RELEASE ORAL at 05:21

## 2018-10-12 RX ADMIN — PIPERACILLIN AND TAZOBACTAM 25 GRAM(S): 4; .5 INJECTION, POWDER, LYOPHILIZED, FOR SOLUTION INTRAVENOUS at 23:16

## 2018-10-12 RX ADMIN — HEPARIN SODIUM 1400 UNIT(S)/HR: 5000 INJECTION INTRAVENOUS; SUBCUTANEOUS at 10:31

## 2018-10-12 RX ADMIN — PIPERACILLIN AND TAZOBACTAM 25 GRAM(S): 4; .5 INJECTION, POWDER, LYOPHILIZED, FOR SOLUTION INTRAVENOUS at 05:20

## 2018-10-12 RX ADMIN — HYDROMORPHONE HYDROCHLORIDE 1 MILLIGRAM(S): 2 INJECTION INTRAMUSCULAR; INTRAVENOUS; SUBCUTANEOUS at 23:23

## 2018-10-12 NOTE — GOALS OF CARE CONVERSATION - PERSONAL ADVANCE DIRECTIVE - CONVERSATION DETAILS
Extensive discussion w/ patient regarding desire to be DNR/DNI. Pt expressed understanding of what it means to be DNR/DNI and has been assessed to have capacity regarding this decision. Over the past five years she feels her quality of life has been poor given her medical diagnoses and has no family to provide support.     Pt does endorse some depression due to her condition but is declining treatment. Supportive care w/ home services offered but declined.     Pt was evaluated by speech and swallow, noted to have aspiration on current diet but pt prefers to remain on current diet and understands aspiration risks including pneumonia.    DNR/DNI placed in chart. Extensive discussion w/ patient regarding desire to be DNR/DNI. Pt expressed understanding of what it means to be DNR/DNI and has been assessed to have capacity regarding this decision. Over the past five years she feels her quality of life has been poor given her medical diagnoses and has no family to provide support.     Pt does endorse some depression due to her condition but is declining treatment. Supportive care w/ home services offered but declined.     Pt was evaluated by speech and swallow, noted to have aspiration on current diet but pt prefers to remain on current diet and understands aspiration risks including pneumonia.    DNR/DNI placed in chart.    35 mins spent in advanced care planning conversations

## 2018-10-12 NOTE — PROGRESS NOTE ADULT - ASSESSMENT
69 yo F w/ left leg hematoma w/ associated cellulitis  - Pt seen and examined  - Dressings removed, saturated w/ serous fluid, Wound appears stable, does not probe or tunnel, no signs of fluctuance  - Pt found to have above knee DVT, started on heparin  - XRays and CT negative for emergent findings  - Cont IV abx  - Likely no surgical management needed at this time, no indication that there is a deeper infection or hardware issue, appears to be a superficial hematoma w/ associated cellulitis to the surrounding skin/ leg  - Because of chronic steroid use, punch biopsies taken of both wounds to r/o a pyoderma  - Pt signed consent and in chart, pain medication given  - Wounds prepped w/ chloroprep  - Punch biopsy taken of skin edge, and central wound for both wounds  - 4 pathology specimens in total  - Will defer to ID for long term abx plan  - D/w attending

## 2018-10-12 NOTE — PROGRESS NOTE ADULT - PROBLEM SELECTOR PLAN 5
Na 128 on admission, likely 2/2 sepsis and decreased PO intake  - improving with fluids, now stable in low 130s  -C/w IVF Pt endorses years of dysphagia but states she does better with thin liquids, likely 2/2 underlying rheumatological disorder with esophageal involvement. Seen by s/s today, patient endorses that she understands she is aspirating  and risk of aspiration pna on current diet but does not wish to deescalate diet.  - c/w current diet (pleasure feeds)  - Soft diet

## 2018-10-12 NOTE — SWALLOW BEDSIDE ASSESSMENT ADULT - SWALLOW EVAL: DIAGNOSIS
Pt presents with oral preparatory and oral stage dysphagia as reported primarily with solids although overall skills deemed to be WFL for mechanical soft diet.  Pharyngeal stage deficits not r/o at b/s however Pt does not consent to objective swallowing assessment and wishes to continue on current diet.  Pt was counselled re: s/s, risks (ie pneumonia, fatality) and precautions of aspiration.  Following education Pt continued to endorse that she wished to accept risk of aspiration to continue on oral diet stating "I can not drink those thickened liquids or pureed food" and saying "I signed the paper".

## 2018-10-12 NOTE — PROGRESS NOTE ADULT - SUBJECTIVE AND OBJECTIVE BOX
RRT Note   RRT called for acute blood loss from LLL      68 F with multiple unclear rheumatologic conditions on chronic prednisone 40 mg since 03/2018 daily, prior L ankle ftx s/p ORIF, recent fall complicated by LLE hematoma and leg ulcer who presents with worsening LLE redness and swelling was started on tx for cellulitis .  Hospital course c/b complicated by hypotension,  MAI, wound infections with variable GNR,  LLE duplex 10/11/18 with extensive proximal fem-pop DVT placed on heparin, and new dysphagia/ aspiration.  Earlier in the day pt has punch biopsy of LLE wound by podiatry , c/b acute blood loss from sites of biopsy. Prior to biopsy pt was fully AC on heparin gtt on hold since ~3:30pm as per primary team . Pt also received Dilaudid 1mg IVP x 2 for dressing change and biopsy .    On arrival to bedside, pt was sitting in bed , pale , awake , responding to all stimuli. LLE in dressing wrapped from knee to the toes laying in significant pool of bright red blood .   SBP 60's . Pt had PIV 24 g ; started 1L NS bolus . With a great team effort dressing was taken off and visible red blood loss from the LLE wound site located on the above the ankle inner lateral aspect of the leg, compressed by multiple 4x4s and abdominal binders and still well saturating. Podiatry at the bedside successfully stopped bleeding by injecting Surgyflow  .  Labs sent; including         On 10/12 patient underwent LLE ulcer biopsy to tailor abx, which was later complicated by significant bleeding c/b hemorragic shock with SBP in 80s for which RRT was called. During RRT, patient was given 1L fluid bolus, FFP, solucortef 100 mg x 1. Labs notable for metabolic acidosis 2/2 lactic acidosis with PTT 40s Hb 11.6, plt 270s and worsening MAI. During RRT there was much difficulty gaining access, after which single second access was established. Patient endorsed she did not want anymore and wishing only for conservative management, declining even blood products. Patient amenable to IV fluids, abx, no more blood draws, and limited VS. Patient expressed strictly not to call boyfriend (no next of kin) unless she passes. Dilaudid prn ordered to comfort. RRT Note   RRT called for acute blood loss from LLL      68 F with multiple unclear rheumatologic conditions on chronic prednisone 40 mg since 03/2018 daily, prior L ankle ftx s/p ORIF, recent fall complicated by LLE hematoma and leg ulcer who presents with worsening LLE redness and swelling was started on tx for cellulitis .  Hospital course c/b complicated by hypotension,  MAI, wound infections with variable GNR,  LLE duplex 10/11/18 with extensive proximal fem-pop DVT placed on heparin, and new dysphagia/ aspiration.  Earlier in the day pt has punch biopsy of LLE wound by podiatry , c/b acute blood loss from sites of biopsy. Prior to biopsy pt was fully AC on heparin gtt on hold since ~3:30pm as per primary team . Pt also received Dilaudid 1mg IVP x 2 for dressing change and biopsy .    On arrival to bedside, pt was sitting in bed , pale , awake , responding to all stimuli. LLE in dressing wrapped from knee to the toes and laying in significant pool of bright red blood .   SBP 60's . Pt had PIV 24 g ; started 1L NS bolus . Called for STAT FFP .  With a great team effort dressing was taken off and visible red blood loss from the LLE wound site located on the above the ankle inner lateral aspect of the leg, compressed by multiple 4x4s and abdominal binders and still well saturating. Podiatry at the bedside successfully stopped bleeding by injecting Surgiflo and dressed the extremity .   Difficult PIV access even with US however, finally  2nd successful PIV was obtained placed and FFP restarted. SBP 80's manually .   CXR with concern for free air under diaphragm; SICU consulted and DR Alanis arrived . MICU team consulted as well . Due to pt's hemodynamics required ICU placement once SBP >90 .   During the entire RRT pt remained awake and Ox3 , communicated her wishes and declined any further intervention. Confirmed clear understanding that if no further interventions RRT Note   RRT called for acute blood loss from LLL      68 F with multiple unclear rheumatologic conditions on chronic prednisone 40 mg since 03/2018 daily, prior L ankle ftx s/p ORIF, recent fall complicated by LLE hematoma and leg ulcer who presents with worsening LLE redness and swelling was started on tx for cellulitis .  Hospital course c/b complicated by hypotension,  MAI, wound infections with variable GNR,  LLE duplex 10/11/18 with extensive proximal fem-pop DVT placed on heparin, and new dysphagia/ aspiration.  Earlier in the day pt has punch biopsy of LLE wound by podiatry , c/b acute blood loss from sites of biopsy. Prior to biopsy pt was fully AC on heparin gtt on hold since ~3:30pm as per primary team . Pt also received Dilaudid 1mg IVP x 2 for dressing change and biopsy .    On arrival to bedside, pt was sitting in bed , pale , awake , responding to all stimuli, denies any pain, chest pain, SOB. LLE in dressing wrapped from knee to the toes and laying in significant pool of dark red blood .   SBP 60's . Pt had PIV 24 g ; started 1L NS bolus . Called for STAT FFP . Blood work sent including type and screen, CBC , CMP, Mg, Phos, PTT/PT/INR, ABG w lytes. Continue to hold all AC. Type and screen obtained.   With a great team effort dressing was taken off and visible red blood loss from the LLE wound site located on the above the ankle inner lateral aspect of the leg, compressed by multiple 4x4s and abdominal binders and still well saturating. Podiatry at the bedside successfully stopped bleeding by injecting Surgiflo and dressed the extremity . SBP manully 80's.  Difficult PIV access even with US however, finally  2nd successful PIV was obtained placed and FFP restarted. SBP 90's 's. See RRT vital signs flow sheet .   CXR with concern for free air under diaphragm; SICU consulted and DR Alanis arrived . MICU team consulted as well . Due to pt's hemodynamics required ICU placement however plan to safely move pt once SBP >90 x 2  .   During the entire RRT pt remained awake and Ox3 , communicated her wishes and declined any further intervention. Primary team at the pt's bedside has taken over pt's care .   Pt is  DNR/DNI.     Kelli Saucedo [facility] [id number]/ [id number]

## 2018-10-12 NOTE — PROGRESS NOTE ADULT - SUBJECTIVE AND OBJECTIVE BOX
Patient is a 68y old  Female who presents with a chief complaint of Leg weakness (12 Oct 2018 09:05)       INTERVAL HPI/OVERNIGHT EVENTS:  Patient seen and evaluated at bedside.  Pt is resting comfortable in NAD. Denies N/V/F/C.  Pain rated at X/10    Allergies    penicillins (Unknown)    Intolerances        Vital Signs Last 24 Hrs  T(C): 36.5 (12 Oct 2018 04:40), Max: 36.6 (11 Oct 2018 21:28)  T(F): 97.7 (12 Oct 2018 04:40), Max: 97.8 (11 Oct 2018 21:28)  HR: 90 (12 Oct 2018 11:53) (81 - 90)  BP: 114/84 (12 Oct 2018 11:53) (106/74 - 125/82)  BP(mean): --  RR: 18 (12 Oct 2018 04:40) (18 - 18)  SpO2: 99% (12 Oct 2018 04:40) (97% - 99%)    LABS:                        12.7   18.7  )-----------( 225      ( 12 Oct 2018 08:09 )             37.7     10-12    134<L>  |  101  |  95<H>  ----------------------------<  109<H>  3.4<L>   |  15<L>  |  1.70<H>    Ca    8.8      12 Oct 2018 08:09    TPro  6.2  /  Alb  2.7<L>  /  TBili  0.5  /  DBili  x   /  AST  12  /  ALT  15  /  AlkPhos  78  10-11    PTT - ( 12 Oct 2018 08:09 )  PTT:178.1 sec    CAPILLARY BLOOD GLUCOSE          Lower Extremity Physical Exam:  Vascular: DP/PT 0/4, B/L, CFT < 3 seconds B/L, Temperature gradient cool, B/L.   Neuro: Epicritic sensation  to the level of toes, B/L  Musculoskeletal/Ortho:   Left leg: 6x6 evacuated hematoma at posteromedial distal calf w/ associated diffuse cellulitis, surrounding edema and ecchymosis, draining serous fluid, hematoma evacuation to the level of superficial dermis, does not probe or tunnel, no fluctuance noted, +Malodor     Left foot: dorsolateral wound w/ mostly granular wound bed, w/ surrounding sloughing skin extending to the entire left dorsal foot,, w/ diffuse cellulitis, edema, +malodor, serosanguinous drainage,

## 2018-10-12 NOTE — PROGRESS NOTE ADULT - PROBLEM SELECTOR PLAN 6
Pt endorses years of dysphagia but states she does better with thin liquids. 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  -Follows Dr. Renae Lopez ([phone]) will contact for collateral

## 2018-10-12 NOTE — PROGRESS NOTE ADULT - PROBLEM SELECTOR PLAN 8
- DVT prophylaxis: holding pharm VTE in the setting of large hematoma  - GI prophylaxis: Pantoprazole PO in the setting of chronic steroid use Extensive discussion had with patient. She is endorsing with her ongoing rheumatologic, poor quality of life, lack of familial support, inability to tolerate PO that she wishes to be DNR DNI. She endorses mild depression due to her disease process, deferring treatment but no suicidal or homicidal ideation. She is amenable to treating reversible causes. DNR DNI placed in chart

## 2018-10-12 NOTE — SWALLOW BEDSIDE ASSESSMENT ADULT - COMMENTS
Pt with traumatic LLE ulcers and hematoma following fall, now admitted for severe sepsis 2/2 superimposing cellulitis and infected hematoma refractory to outpatient abx;  Initially with hypotension, tachyardia and leukocytosis and lactate 3.3 BP, meeting severe sepsis criteria on admission responsive to fluids and stress dose steroids, likely SSTI; MAI (acute kidney injury).  Plan: Likely prerenal 2/2 sepsis and poor PO intake; -Trend Cr for improvement; -C/w IVF, hydration;  Pt endorses years of dysphagia but states she does better with thin liquids. 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  Polymyositis.  Plan: -Pt states uncertain of diagnosis, per PMD records PMH of polymyositis, dermatomyositis, CREST syndrome.    10/10/18 CXR: FINDINGS: No focal lung consolidations, pleural effusions or pneumothorax.  Cardiomediastinal silhouette is normal.  IMPRESSION: No consolidation.

## 2018-10-12 NOTE — CHART NOTE - NSCHARTNOTEFT_GEN_A_CORE
68 F with history of polymyositis and unclear diagnosis of multiple rheumatologic condition on chronic  Prednisone 40 mg since 03/2018 daily and chronic left leg ulcer presented with left leg weakness and LLE ulcer. Patient has been getting progressively weaker for the past 2 weeks and sustained a fall 2 weeks ago and LE ulcer has been getting progressively worse with drainage of serosanguinous fluid from blisters. She recently went to [facility] after the fall and were discharged on PO antibiotics (which the patient did not remember the name). She noticed increasing drainage today and worsening pain, thus presented to the ED today. Patient previously had debridement with skin graft application in 2015 after sustaining an ankle fracture of the LLE.      Patient denies fever, but does endorse chills. She endorses decreased appetite. No chest pain, no shortness of breath. She denies sick contact. She endorses difficulty swallowing secondary to polymyositis and has been drinking and eating in small portions. She denies nausea or vomiting or abdominal pain. She denies urinary symptoms. She has difficulty with ambulation for the past 2 weeks and has trouble sitting herself up.     In the ED, initial vitals were Temp 36.4, , RR 16, /87. Patient's blood pressure dropped to 85/67 and she received total of 2 L NS bolus. She also received 1x Vancomycin and Clindamycin. 68 F with history of polymyositis and unclear diagnosis of multiple rheumatologic condition on chronic prednisone 40 mg since 03/2018 daily and recent fall complicated by LLE hematoma and leg ulcer who presents with worsening LLE redness . Patient has been getting progressively weaker for the past 2 weeks and sustained a fall 2 weeks ago and LE ulcer has been getting progressively worse with drainage of serosanguinous fluid from blisters. She recently went to [facility] after the fall and were discharged on PO antibiotics (which the patient did not remember the name). She noticed increasing drainage today and worsening pain, thus presented to the ED today. Patient previously had debridement with skin graft application in 2015 after sustaining an ankle fracture of the LLE.      Patient denies fever, but does endorse chills. She endorses decreased appetite. No chest pain, no shortness of breath. She denies sick contact. She endorses difficulty swallowing secondary to polymyositis and has been drinking and eating in small portions. She denies nausea or vomiting or abdominal pain. She denies urinary symptoms. She has difficulty with ambulation for the past 2 weeks and has trouble sitting herself up.     In the ED, initial vitals were Temp 36.4, , RR 16, /87. Patient's blood pressure dropped to 85/67 and she received total of 2 L NS bolus. She also received 1x Vancomycin and Clindamycin. 68 F with multiple unclear rheumatologic conditions on chronic prednisone 40 mg since 03/2018 daily, prior L ankle ftx s/p ORIF, recent fall complicated by LLE hematoma and leg ulcer who presents with worsening LLE redness and swelling .  Two weeks prior to admission, patient had sustained a fall at home with resulting hematoma. The hematoma later developed overlying blisters with serosanguinous drainage for which she was initially treated with PO abx at [facility]. She lives alone at home with limited access and has assistance from her boyfriend who helps her with dressing changes. Patient noted past few days she has developed worsening superimposing chills, redness, pain and weakness in her leg for which she presented to the ED. In the ED, patient met severe sepsis criterai with Temp 36.4, , RR 16, /87, leukocytosis, lactic acidosis furter Patient's blood pressure dropped to 85/67 and she received total of 2 L NS bolus. She also received 1x Vancomycin and Clindamycin. 68 F with multiple unclear rheumatologic conditions on chronic prednisone 40 mg since 03/2018 daily, prior L ankle ftx s/p ORIF, recent fall complicated by LLE hematoma and leg ulcer who presents with worsening LLE redness and swelling .  Two weeks prior to admission, patient had sustained a fall at home with resulting hematoma. The hematoma later developed overlying blisters with serosanguinous drainage for which she was initially treated with PO abx at [facility]. She lives alone at home with limited access and has assistance from her boyfriend who helps her with dressing changes. Patient noted past few days she has developed worsening superimposing chills, redness, pain and weakness in her leg for which she presented to the ED. In the ED, patient met severe sepsis criteria with Temp 36.4, , RR 16, /87, leukocytosis, lactic acidosis, further complicated by hypotension s/p 2L fluids and stress dose steroids and MAI. Patient was managed on Vanc by level and Zosyn for cellulitis c/b fevers. Podiatry and ID were consulted for further management of wound infections, with wound ctx growing variable GNR . On LLE duplex on 10/11 patient was found to have extensive proximal fem-pop DVT and was initiated on heparin with intent to transition to lovenox pending improvement on kidney function. Overnight on 10/11, patient made decision to make herself DNR DNI as she feels her underlying rheumatologic condition that has been difficult to make formal diagnosis with ongoing dysphagia has impaired her overall QOL. In addition she has had ongoing dysphagia. Patient was evaluated by speech and swallow deemed to have aspiration on current diet, but patient insistent on continuing current diet with understanding of aspiration risk. On 10/12 patient underwent LLE ulcer biopsy to tailor abx, which was later complicated by significant bleeding c/b hemorragic shock with SBP in 80s for which RRT was called. During RRT, patient was given 1L fluid bolus, FFP, solucortef 100 mg x 1. Multiple attempts to establish IV 68 F with multiple unclear rheumatologic conditions on chronic prednisone 40 mg since 03/2018 daily, prior L ankle ftx s/p ORIF, recent fall complicated by LLE hematoma and leg ulcer who presents with worsening LLE redness and swelling .  Two weeks prior to admission, patient had sustained a fall at home with resulting hematoma. The hematoma later developed overlying blisters with serosanguinous drainage for which she was initially treated with PO abx at [facility]. She lives alone at home with limited access and has assistance from her boyfriend who helps her with dressing changes. Patient noted past few days she has developed worsening superimposing chills, redness, pain and weakness in her leg for which she presented to the ED. In the ED, patient met severe sepsis criteria with Temp 36.4, , RR 16, /87, leukocytosis, lactic acidosis, further complicated by hypotension s/p 2L fluids and stress dose steroids and MAI. Patient was managed on Vanc by level and Zosyn for cellulitis c/b fevers. Podiatry and ID were consulted for further management of wound infections, with wound ctx growing variable GNR . On LLE duplex on 10/11 patient was found to have extensive proximal fem-pop DVT and was initiated on heparin with intent to transition to lovenox pending improvement on kidney function. Overnight on 10/11, patient made decision to make herself DNR DNI as she feels her underlying rheumatologic condition that has been difficult to make formal diagnosis with ongoing dysphagia has impaired her overall QOL. In addition she has had ongoing dysphagia. Patient was evaluated by speech and swallow deemed to have aspiration on current diet, but patient insistent on continuing current diet with understanding of aspiration risk. On 10/12 patient has episode of emesis to fluids and solids with choking after which patient declined further nutrition and was transiently placed on nasal canula. On 10/12 patient underwent LLE ulcer biopsy to tailor abx, which was later complicated by significant bleeding c/b hemorragic shock with SBP in 80s for which RRT was called. During RRT, patient was given 1L fluid bolus, FFP, solucortef 100 mg x 1. Labs notable for metabolic acidosis 2/2 lactic acidosis with PTT 40s Hb 11.6, plt 270s and worsening MAI. During RRT there was much difficulty gaining access, after which single second access was established. Patient endorsed she did not want anymore and wishing only for conservative management, declining even blood products. Patient amenable to IV fluids, abx, no more blood draws, and limited VS. Patient expressed strictly not to call boyfriend (no next of kin) unless she passes. [place]avan arzate ordered to comfort. 68 F with multiple unclear rheumatologic conditions on chronic prednisone 40 mg since 03/2018 daily, prior L ankle ftx s/p ORIF, recent fall complicated by LLE hematoma and leg ulcer who presents with worsening LLE redness and swelling .  Two weeks prior to admission, patient had sustained a fall at home with resulting hematoma. The hematoma later developed overlying blisters with serosanguinous drainage for which she was initially treated with PO abx at [facility]. She lives alone at home with limited access and has assistance from her boyfriend who helps her with dressing changes. Patient noted past few days she has developed worsening superimposing chills, redness, pain and weakness in her leg for which she presented to the ED. In the ED, patient met severe sepsis criteria with Temp 36.4, , RR 16, /87, leukocytosis, lactic acidosis, further complicated by hypotension s/p 2L fluids and stress dose steroids and MAI. Patient was managed on Vanc by level and Zosyn for cellulitis c/b fevers. Podiatry and ID were consulted for further management of wound infections, with wound ctx growing variable GNR . On LLE duplex on 10/11 patient was found to have extensive proximal fem-pop DVT and was initiated on heparin with intent to transition to lovenox pending improvement on kidney function. Overnight on 10/11, patient made decision to make herself DNR DNI as she feels her underlying rheumatologic condition that has been difficult to make formal diagnosis with ongoing dysphagia has impaired her overall QOL. In addition she has had ongoing dysphagia. Patient was evaluated by speech and swallow deemed to have aspiration on current diet, but patient insistent on continuing current diet with understanding of aspiration risk. On 10/12 patient has episode of emesis to fluids and solids with choking after which patient declined further nutrition and was transiently placed on nasal canula. On 10/12 patient underwent LLE ulcer biopsy to tailor abx, which was later complicated by significant bleeding c/b hemorragic shock with SBP in 80s for which RRT was called. During RRT, patient was given 1L fluid bolus, FFP, solucortef 100 mg x 1. Labs notable for metabolic acidosis 2/2 lactic acidosis with PTT 40s Hb 11.6, plt 270s and worsening MAI. CXR obtained for desaturation, incidentaly noted to have free air under diaphragm not present on admission. During RRT there was much difficulty gaining access, after which single second access was established. Patient endorsed she did not want anymore and wishing only for conservative management, declining even blood products. Initial plans to consult MICU and surgery, given patient GOC. Patient amenable to IV fluids, abx, no more blood draws, and limited VS. Patient expressed strictly not to call boyfriend (no next of kin) unless she passes. Dilaudid prn ordered to comfort. 68 F with multiple unclear rheumatologic conditions on chronic prednisone 40 mg since 03/2018 daily, prior L ankle ftx s/p ORIF, recent fall complicated by LLE hematoma and leg ulcer who presents with worsening LLE redness and swelling .  Two weeks prior to admission, patient had sustained a fall at home with resulting hematoma. The hematoma later developed overlying blisters with serosanguinous drainage for which she was initially treated with PO abx at [facility]. She lives alone at home with limited access and has assistance from her boyfriend who helps her with dressing changes. Patient noted past few days she has developed worsening superimposing chills, redness, pain and weakness in her leg for which she presented to the ED. In the ED, patient met severe sepsis criteria with Temp 36.4, , RR 16, /87, leukocytosis, lactic acidosis, further complicated by hypotension s/p 2L fluids and stress dose steroids and MAI. Patient was managed on Vanc by level and Zosyn for cellulitis c/b fevers. Podiatry and ID were consulted for further management of wound infections, with wound ctx growing variable GNR . On LLE duplex on 10/11 patient was found to have extensive proximal fem-pop DVT and was initiated on heparin with intent to transition to lovenox pending improvement on kidney function. Overnight on 10/11, patient made decision to make herself DNR DNI as she feels her underlying rheumatologic condition that has been difficult to make formal diagnosis with ongoing dysphagia has impaired her overall QOL. In addition she has had ongoing dysphagia. Patient was evaluated by speech and swallow deemed to have aspiration on current diet, but patient insistent on continuing current diet with understanding of aspiration risk. On 10/12 patient has episode of emesis to fluids and solids with choking after which patient declined further nutrition and was transiently placed on nasal canula. On 10/12 patient underwent LLE ulcer biopsy to tailor abx, which was later complicated by significant bleeding c/b hemorragic shock with SBP in 80s for which RRT was called. During RRT, patient was given 1L fluid bolus, FFP, solucortef 100 mg x 1. Labs notable for metabolic acidosis 2/2 lactic acidosis with PTT 40s Hb 11.6, plt 270s and worsening MAI. CXR obtained for desaturation, incidentaly noted to have free air under diaphragm not present on admission. Pt had benign abdominal exam during RRT. During RRT there was much difficulty gaining access, after which single second access was established. Patient endorsed she did not want anymore and wishing only for conservative management, declining even blood products or further IV placements. Pt agreeable to continued IVF and antibiotics. Declining further work up of abdominal Xray findings. Initial plans to consult MICU and surgery, given patient GOC. Patient amenable to IV fluids, abx, no more blood draws, and limited VS. Patient expressed strictly not to call boyfriend (no next of kin) unless she passes. Dilaudid prn ordered for comfort.      Critical care time spent 60 mins. 68 F with multiple unclear rheumatologic conditions on chronic prednisone 40 mg since 03/2018 daily, prior L ankle ftx s/p ORIF, recent fall complicated by LLE hematoma and leg ulcer who presents with worsening LLE redness and swelling .  Two weeks prior to admission, patient had sustained a fall at home with resulting hematoma. The hematoma later developed overlying blisters with serosanguinous drainage for which she was initially treated with PO abx at [facility]. She lives alone at home with limited access and has assistance from her boyfriend who helps her with dressing changes. Patient noted past few days she has developed worsening superimposing chills, redness, pain and weakness in her leg for which she presented to the ED. In the ED, patient met severe sepsis criteria with Temp 36.4, , RR 16, /87, leukocytosis, lactic acidosis, further complicated by hypotension s/p 2L fluids and stress dose steroids and MAI. Patient was managed on Vanc by level and Zosyn for cellulitis c/b fevers. Podiatry and ID were consulted for further management of wound infections, with wound ctx growing variable GNR . On LLE duplex on 10/11 patient was found to have extensive proximal fem-pop DVT and was initiated on heparin with intent to transition to lovenox pending improvement on kidney function. Overnight on 10/11, patient made decision to make herself DNR DNI as she feels her underlying rheumatologic condition that has been difficult to make formal diagnosis with ongoing dysphagia has impaired her overall QOL. In addition she has had ongoing dysphagia. Patient was evaluated by speech and swallow deemed to have aspiration on current diet, but patient insistent on continuing current diet with understanding of aspiration risk. On 10/12 patient has episode of emesis to fluids and solids with choking after which patient declined further nutrition and was transiently placed on nasal canula. On 10/12 patient underwent LLE ulcer biopsy to tailor abx, which was later complicated by significant bleeding c/b hemorraghic shock with SBP in 80s for which RRT was called. During RRT, patient was given 1L fluid bolus, FFP, solucortef 100 mg x 1. Podiatry arrived at and achieved hemostasis via surgiflo. Labs notable for metabolic acidosis 2/2 lactic acidosis with PTT 40s Hb 11.6, plt 270s and worsening MAI. CXR obtained for desaturation, incidentally noted to have free air under diaphragm not present on admission. Pt had benign abdominal exam during RRT. During RRT there was much difficulty gaining access, after which single second access was established. Patient endorsed she did not want anymore and wishing only for conservative management, declining even blood products or further IV placements. Pt agreeable to continued IVF and antibiotics. Declining further work up of abdominal Xray findings . Initial plans to consult MICU and surgery, given patient GOC. Patient expressed strictly not to call boyfriend (no next of kin) unless she passes. Dilaudid prn ordered for comfort.      Critical care time spent 60 mins.

## 2018-10-12 NOTE — PROGRESS NOTE ADULT - PROBLEM SELECTOR PLAN 2
Initially with hypotension, tachyardia and leukocytosis and lactate 3.3 BP, meeting severe sepsis criteria on admission responsive to fluids and stress dose steroids, likely SSTI   - c/w IVF  - c/w abx as above  - blood btx 10/9 NGTD  - downtitrate hydrocortisone to 50 mg q8 x 3 doses  - trend lactate

## 2018-10-12 NOTE — PROGRESS NOTE ADULT - ASSESSMENT
Patient is a 68y old  Female Hx multiple falls, LLE ankle and leg fracture s/p ORIF w/ rods and screws, polymyositis on chronic solumedrol 40mg daily who presents w/ a LLE ulceration and worsening erythema on outpt abx found to be tachycardic w/ leukocytosis and course c/b hypotension responsive to fluids and MAI concerning for severe sepsis 2/2 cellulitis.    Suggest:  Continue IV Vancomycin per level   Continue IV zosyn 3.375 q8hrs as long as creatinine clearance is >20  waiting for speciation of GNR in culture  MRI without contrast if not contraindicated   Pt with DVT    follow up on blood cultures   team is addressing with patient goals of care  for now she is refusing transfer to ICU  Case discussed with medical residents       ID service will be covering over the weekend. Please call for acute issues or questions. [phone]

## 2018-10-12 NOTE — SWALLOW BEDSIDE ASSESSMENT ADULT - SWALLOW EVAL: RECOMMENDED DIET
Dietary decision deferred to MD to be made in accordance with Pt/family wishes as Pt does not consent to modified diet textures at this time and refuses objective swallowing assessment.

## 2018-10-12 NOTE — PROGRESS NOTE ADULT - SUBJECTIVE AND OBJECTIVE BOX
Patient is a 68y old  Female who presents with a chief complaint of Leg weakness (12 Oct 2018 13:06)    Being followed by ID for        Interval history:  No other acute events      ROS:  No cough,SOB,CP  No N/V/D  No abd pain  No urinary complaints  No HA  No joint or limb pain  No other complaints    PAST MEDICAL & SURGICAL HISTORY:  Polymyositis  History of Tonsillectomy  History of skin graft  Ankle fracture    Allergies    penicillins (Unknown)    Intolerances      Antimicrobials:    piperacillin/tazobactam IVPB. 3.375 Gram(s) IV Intermittent every 8 hours    MEDICATIONS  (STANDING):  enoxaparin Injectable 90 milliGRAM(s) SubCutaneous two times a day  hydrocortisone sodium succinate Injectable 50 milliGRAM(s) IV Push every 12 hours  influenza   Vaccine 0.5 milliLiter(s) IntraMuscular once  pantoprazole    Tablet 40 milliGRAM(s) Oral before breakfast  piperacillin/tazobactam IVPB. 3.375 Gram(s) IV Intermittent every 8 hours  sodium chloride 0.9%. 1000 milliLiter(s) (100 mL/Hr) IV Continuous <Continuous>  sodium chloride 0.9%. 1000 milliLiter(s) (100 mL/Hr) IV Continuous <Continuous>      Vital Signs Last 24 Hrs  T(C): 36.3 (10-12-18 @ 14:14), Max: 36.6 (10-11-18 @ 21:28)  T(F): 97.3 (10-12-18 @ 14:14), Max: 97.8 (10-11-18 @ 21:28)  HR: 114 (10-12-18 @ 14:14) (81 - 114)  BP: 114/81 (10-12-18 @ 14:14) (114/75 - 125/82)  BP(mean): --  RR: 17 (10-12-18 @ 14:14) (17 - 18)  SpO2: 95% (10-12-18 @ 14:14) (95% - 99%)    Physical Exam:    Constitutional well preserved,comfortable,pleasant    HEENT PERRLA EOMI,No pallor or icterus    No oral exudate or erythema    Neck supple no JVD or LN    Chest Good AE,CTA    CVS RRR S1 S2 WNl No murmur or rub or gallop    Abd soft BS normal No tenderness no masses    Ext No cyanosis clubbing or edema    IV site no erythema tenderness or discharge    Joints no swelling or LOM    CNS AAO X 3 no focal    Lab Data:                          12.7   18.7  )-----------( 225      ( 12 Oct 2018 08:09 )             37.7       10-12    134<L>  |  101  |  95<H>  ----------------------------<  109<H>  3.4<L>   |  15<L>  |  1.70<H>    Ca    8.8      12 Oct 2018 08:09    TPro  6.2  /  Alb  2.7<L>  /  TBili  0.5  /  DBili  x   /  AST  12  /  ALT  15  /  AlkPhos  78  10-11          .Surgical Swab Synovial Fluid  10-09-18   No growth to date.  --  --      .Abscess left leg hematoma  10-09-18   Numerous Mixed gram negative rods "Susceptibilities not performed"  --  --      .Blood Blood  10-09-18   No growth to date.  --  --          Vancomycin Level, Trough: 17.2 ug/mL (10-12-18 @ 08:09)  Vancomycin Level, Trough: 21.6 ug/mL (10-11-18 @ 13:34)      WBC Count: 18.7 (10-12-18 @ 08:09)  WBC Count: 14.8 (10-11-18 @ 13:34)  WBC Count: 15.6 (10-10-18 @ 11:59)  WBC Count: 23.5 (10-09-18 @ 14:28)    < from: VA Duplex Lower Ext Vein Scan, Left (10.11.18 @ 20:27) >  IMPRESSION:     Extensive above and below the knee LEFT femoropopliteal DVT as above.     Findings were discussed by the technologist with Dr. Whitney BLANTON M.D., RADIOLOGY RESIDENT  This document has been electronically signed.  GONZALO DOBBS M.D., ATTENDING RADIOLOGIST  This document has been electronically signed. Oct 11 2018 10:23PM        < end of copied text > Patient is a 68y old  Female who presents with a chief complaint of Leg weakness (12 Oct 2018 13:06)    Being followed by ID for help with management        Interval history:  went to see patient and found that patient with bleeding around leg  Pt was lethargic  Team notified and rapid response called  Pt unable to answer other questions of ROS    PAST MEDICAL & SURGICAL HISTORY:  Polymyositis  History of Tonsillectomy  History of skin graft  Ankle fracture    Allergies    penicillins (Unknown)    Intolerances      Antimicrobials:    piperacillin/tazobactam IVPB. 3.375 Gram(s) IV Intermittent every 8 hours    MEDICATIONS  (STANDING):  enoxaparin Injectable 90 milliGRAM(s) SubCutaneous two times a day  hydrocortisone sodium succinate Injectable 50 milliGRAM(s) IV Push every 12 hours  influenza   Vaccine 0.5 milliLiter(s) IntraMuscular once  pantoprazole    Tablet 40 milliGRAM(s) Oral before breakfast  piperacillin/tazobactam IVPB. 3.375 Gram(s) IV Intermittent every 8 hours  sodium chloride 0.9%. 1000 milliLiter(s) (100 mL/Hr) IV Continuous <Continuous>  sodium chloride 0.9%. 1000 milliLiter(s) (100 mL/Hr) IV Continuous <Continuous>      Vital Signs Last 24 Hrs  T(C): 36.3 (10-12-18 @ 14:14), Max: 36.6 (10-11-18 @ 21:28)  T(F): 97.3 (10-12-18 @ 14:14), Max: 97.8 (10-11-18 @ 21:28)  HR: 114 (10-12-18 @ 14:14) (81 - 114)  BP: 114/81 (10-12-18 @ 14:14) (114/75 - 125/82)  BP(mean): --  RR: 17 (10-12-18 @ 14:14) (17 - 18)  SpO2: 95% (10-12-18 @ 14:14) (95% - 99%)    Physical Exam:    pt lethargic    HEENT PERRLA EOMI,No pallor or icterus    No oral exudate or erythema    Neck supple no JVD or LN    Chest Good AE,CTA    CVS S1S2 tachy    Abd soft BS normal No tenderness no masses    Ext LLE edema and bleeding    IV site no erythema tenderness or discharge    Joints no swelling or LOM    CNS AAO X 3 no focal    Lab Data:                          12.7   18.7  )-----------( 225      ( 12 Oct 2018 08:09 )             37.7       10-12    134<L>  |  101  |  95<H>  ----------------------------<  109<H>  3.4<L>   |  15<L>  |  1.70<H>    Ca    8.8      12 Oct 2018 08:09    TPro  6.2  /  Alb  2.7<L>  /  TBili  0.5  /  DBili  x   /  AST  12  /  ALT  15  /  AlkPhos  78  10-11          .Surgical Swab Synovial Fluid  10-09-18   No growth to date.  --  --      .Abscess left leg hematoma  10-09-18   Numerous Mixed gram negative rods "Susceptibilities not performed"  --  --      .Blood Blood  10-09-18   No growth to date.  --  --          Vancomycin Level, Trough: 17.2 ug/mL (10-12-18 @ 08:09)  Vancomycin Level, Trough: 21.6 ug/mL (10-11-18 @ 13:34)      WBC Count: 18.7 (10-12-18 @ 08:09)  WBC Count: 14.8 (10-11-18 @ 13:34)  WBC Count: 15.6 (10-10-18 @ 11:59)  WBC Count: 23.5 (10-09-18 @ 14:28)    < from: VA Duplex Lower Ext Vein Scan, Left (10.11.18 @ 20:27) >  IMPRESSION:     Extensive above and below the knee LEFT femoropopliteal DVT as above.     Findings were discussed by the technologist with Dr. Whitney BLANTON M.D., RADIOLOGY RESIDENT  This document has been electronically signed.  GONZALO DOBBS M.D., ATTENDING RADIOLOGIST  This document has been electronically signed. Oct 11 2018 10:23PM        < end of copied text >

## 2018-10-12 NOTE — SWALLOW BEDSIDE ASSESSMENT ADULT - SLP GENERAL OBSERVATIONS
Pt awake, alert and oriented x 3. Pt awake, alert and oriented x 3.  Reduced speech intelligibility secondary to dysarthria which Pt reports to be at baseline.  Pt reports reduced appetite and occasional sensation of food feeling stuck in the mid thoracic region.

## 2018-10-12 NOTE — PROGRESS NOTE ADULT - PROBLEM SELECTOR PLAN 3
Likely prerenal 2/2 sepsis and poor PO intake  -Trend Cr for improvement  -C/w IVF, hydration Likely prerenal 2/2 sepsis and poor PO intake  - Cr downtrending  - c/w fluid hydration, address underlying infection as below

## 2018-10-12 NOTE — PROGRESS NOTE ADULT - SUBJECTIVE AND OBJECTIVE BOX
HIWOT SMILEY  68y  Female      Patient is a 68y old  Female Hx multiple falls, LLE ankle and leg fracture s/p ORIF w/ rods and screws, polymyositis on chronic solumedrol 40mg daily who presents with a chief complaint of Leg ulceration and erythema.    INTERVAL HPI/OVERNIGHT EVENTS:    U/S + for extensive dvt, pt started on heparin. Pt also called NF doc to discuss being DNR/DNI and her code status has been changed accordingly. Denies any n/v, f/c.     PHYSICAL EXAM:  Vital Signs Last 24 Hrs  T(C): 36.4 (10 Oct 2018 08:51), Max: 36.9 (09 Oct 2018 23:31)  T(F): 97.5 (10 Oct 2018 08:51), Max: 98.5 (09 Oct 2018 23:31)  HR: 100 (10 Oct 2018 10:08) (78 - 106)  BP: 106/74 (10 Oct 2018 10:08) (85/67 - 128/87)  BP(mean): --  RR: 18 (10 Oct 2018 08:51) (8 - 18)  SpO2: 96% (10 Oct 2018 08:51) (94% - 100%)  GENERAL: NAD, well-groomed, well developed  HEAD:  Atraumatic, Normocephalic, b/l facial droop  EYES: EOMI, PERRLA, conjunctiva and sclera clear  ENMT: No tonsillar erythema, exudates, or enlargement; Moist mucous membranes, Good dentition, No lesions  NECK: Supple, No JVD, Normal thyroid  NERVOUS SYSTEM:  Alert & Oriented X3, Good concentration; Motor Strength 4/5 B/L upper and lower extremities; DTRs 2+ intact and symmetric  CHEST/LUNG: Clear to auscultation bilaterally; No rales, rhonchi, wheezing, or rubs  HEART: Regular rate and rhythm; No murmurs, rubs, or gallops  ABDOMEN: Obese, Soft, Nontender, Nondistended; Bowel sounds present  EXTREMITIES:  2+ Peripheral Pulses in b/l UE, and RLE, unable to asses DP or PT pulses in LLE due to wrap, <2 sec cap refill b/l toes. LLE wrapped from proximal toes to distal leg. Area of erythema extends from wrap up to mid to distal thigh, warm and tender to palpation. Dressing w/ no obvious drainage or bleeding. RLE w/ 2+ pitting edema to knee.  LYMPH: No lymphadenopathy noted    Consultant(s) Notes Reviewed:  [x ] YES  [ ] NO  Care Discussed with Consultants/Other Providers [ x] YES  [ ] NO    Medications  MEDICATIONS  (STANDING):  cefepime   IVPB 2000 milliGRAM(s) IV Intermittent every 24 hours  hydrocortisone sodium succinate Injectable 100 milliGRAM(s) IV Push every 8 hours  influenza   Vaccine 0.5 milliLiter(s) IntraMuscular once  pantoprazole    Tablet 40 milliGRAM(s) Oral before breakfast  sodium chloride 0.9%. 1000 milliLiter(s) (100 mL/Hr) IV Continuous <Continuous>    MEDICATIONS  (PRN):  acetaminophen   Tablet .. 975 milliGRAM(s) Oral every 6 hours PRN Mild Pain (1 - 3)  traMADol 25 milliGRAM(s) Oral every 8 hours PRN Moderate Pain (4 - 6)      LABS:                                     15.3   15.6  )-----------( 209      ( 10 Oct 2018 11:59 )             46.4   10-10    134<L>  |  103  |  118<H>  ----------------------------<  154<H>  4.1   |  13<L>  |  2.46<H>    Ca    8.0<L>      10 Oct 2018 11:59  Phos  7.4     10-10  Mg     2.6     10-10    TPro  8.0  /  Alb  4.2  /  TBili  0.7  /  DBili  x   /  AST  10  /  ALT  17  /  AlkPhos  70  10-09    Blood Gas Profile - Venous (10.10.18 @ 12:02)    pH, Venous: 7.37    pCO2, Venous: 24 mmHg    pO2, Venous: 85 mmHg    HCO3, Venous: 13 mmol/L    Base Excess, Venous: -9.7 mmol/L    Oxygen Saturation, Venous: 96 %    Total CO2, Venous: 14 mmol/L    Blood Gas Source Venous: Venous            CAPILLARY BLOOD GLUCOSE      RADIOLOGY & ADDITIONAL TESTS:  Imaging Personally Reviewed:  [ ] YES  [ ] NO  < from: Xray Chest 1 View- PORTABLE-Urgent (10.10.18 @ 11:43) >  FINDINGS:     No focal lung consolidations, pleural effusions or pneumothorax.  Cardiomediastinal silhouette is normal.      IMPRESSION:     No consolidation.    < end of copied text >      < from: Xray Tibia + Fibula 2 Views, Left (10.09.18 @ 14:45) >  MPRESSION:   There is a large rounded soft tissue lesion along the posterior aspect of   the calf at the level of the distal tibia measuring 7.2 cm in the   craniocaudal dimension. There is no soft tissue gas. There is no adjacent   cortical erosions or periosteal reaction. There is diffuse swelling of   the lower extremity and foot.  No acute fracture is visualized. There are old fractures of the distal   tibia and fibula with intramedullary simone and fixation plate and screws.   There is no evidence of hardware complication. There is an old fracture   of the fifth metatarsal shaft.       < end of copied text >    < from: CT Lower Extremity No Cont, Left (10.09.18 @ 19:43) >  FINDINGS:    OSSEOUS STRUCTURES: The patient is status post ORIF of the tibia with   intramedullary rods with proximal and distal fixation screws.   Additionally, there is a distal lateral plate and screw fixation of the   fibula. A single medial malleolar screw is noted. The hardware bridges   old fractures that are healed. There is moderate medial and mild lateral   knee compartment narrowing. There are chronic fractures of the base of   the fourth and proximal fifth metatarsals. No cortical erosion or   destruction is appreciated. No acute fracture is noted.  SYNOVIUM/ JOINT FLUID: There is no knee joint effusion. No large ankle   joint effusion is seen.  TENDONS: The extensor mechanism is intact. The tendons about the ankle   are normal.  MUSCLES: There is severe atrophy of the calf and distal thigh   musculature. No intramuscular edema is identified.  NEUROVASCULAR STRUCTURES: Neurovascular structures are preserved.  SUBCUTANEOUS SOFT TISSUES: There is soft tissue swelling about the calf.   No drainable fluid collection is identified. There is mild skin   irregularity along the anteromedial distal calf.    3-D reformatted imaging confirms these findings.    IMPRESSION:    Moderate circumferential soft tissue swelling with skin thickening,   nonspecific but can be seen with cellulitis. No drainable fluid   collection.  No CT evidence for osteomyelitis.  Severe calf and distal thigh muscle atrophy.    < end of copied text >      HEALTH ISSUES - PROBLEM Dx:  Cellulitis of left lower extremity: Cellulitis of left lower extremity  Dysphagia: Dysphagia  Prophylactic measure: Prophylactic measure  Polymyositis: Polymyositis  Hyponatremia: Hyponatremia  Metabolic acidosis: Metabolic acidosis  MAI (acute kidney injury): MAI (acute kidney injury)  Hematoma of left lower extremity, sequela: Hematoma of left lower extremity, sequela  Severe sepsis: Severe sepsis HIWOT SMILEY  68y  Female      Patient is a 68y old  Female Hx multiple falls, LLE ankle and leg fracture s/p ORIF w/ rods and screws, polymyositis on chronic solumedrol 40mg daily who presents with a chief complaint of Leg ulceration and erythema.    INTERVAL HPI/OVERNIGHT EVENTS:    U/S + for extensive dvt, pt started on heparin. Pt also called NF doc to discuss being DNR/DNI and her code status has been changed accordingly. Denies any n/v, f/c.     PHYSICAL EXAM:  Vital Signs Last 24 Hrs  T(C): 36.4 (10 Oct 2018 08:51), Max: 36.9 (09 Oct 2018 23:31)  T(F): 97.5 (10 Oct 2018 08:51), Max: 98.5 (09 Oct 2018 23:31)  HR: 100 (10 Oct 2018 10:08) (78 - 106)  BP: 106/74 (10 Oct 2018 10:08) (85/67 - 128/87)  BP(mean): --  RR: 18 (10 Oct 2018 08:51) (8 - 18)  SpO2: 96% (10 Oct 2018 08:51) (94% - 100%)      GENERAL: NAD, well-groomed, well developed  HEAD:  Atraumatic, Normocephalic, b/l facial droop  EYES: EOMI, PERRLA, conjunctiva and sclera clear  ENMT: No tonsillar erythema, exudates, or enlargement; Moist mucous membranes, Good dentition, No lesions  NECK: Supple, No JVD, Normal thyroid  NERVOUS SYSTEM:  Alert & Oriented X3, Good concentration; Motor Strength 4/5 B/L upper and lower extremities; DTRs 2+ intact and symmetric  CHEST/LUNG: Clear to auscultation bilaterally; No rales, rhonchi, wheezing, or rubs  HEART: Regular rate and rhythm; No murmurs, rubs, or gallops  ABDOMEN: Obese, Soft, Nontender, Nondistended; Bowel sounds present  EXTREMITIES:  2+ Peripheral Pulses in b/l UE, and RLE, unable to asses DP or PT pulses in LLE due to wrap, <2 sec cap refill b/l toes. LLE wrapped from proximal toes to distal leg. Area of erythema extends from wrap up to mid to distal thigh, warm and tender to palpation. Dressing w/ no obvious drainage or bleeding. RLE w/ 2+ pitting edema to knee.  LYMPH: No lymphadenopathy noted    Consultant(s) Notes Reviewed:  [x ] YES  [ ] NO  Care Discussed with Consultants/Other Providers [ x] YES  [ ] NO    Medications  MEDICATIONS  (STANDING):  cefepime   IVPB 2000 milliGRAM(s) IV Intermittent every 24 hours  hydrocortisone sodium succinate Injectable 100 milliGRAM(s) IV Push every 8 hours  influenza   Vaccine 0.5 milliLiter(s) IntraMuscular once  pantoprazole    Tablet 40 milliGRAM(s) Oral before breakfast  sodium chloride 0.9%. 1000 milliLiter(s) (100 mL/Hr) IV Continuous <Continuous>    MEDICATIONS  (PRN):  acetaminophen   Tablet .. 975 milliGRAM(s) Oral every 6 hours PRN Mild Pain (1 - 3)  traMADol 25 milliGRAM(s) Oral every 8 hours PRN Moderate Pain (4 - 6)      LABS:                                     15.3   15.6  )-----------( 209      ( 10 Oct 2018 11:59 )             46.4   10-10    134<L>  |  103  |  118<H>  ----------------------------<  154<H>  4.1   |  13<L>  |  2.46<H>    Ca    8.0<L>      10 Oct 2018 11:59  Phos  7.4     10-10  Mg     2.6     10-10    TPro  8.0  /  Alb  4.2  /  TBili  0.7  /  DBili  x   /  AST  10  /  ALT  17  /  AlkPhos  70  10-09    Blood Gas Profile - Venous (10.10.18 @ 12:02)    pH, Venous: 7.37    pCO2, Venous: 24 mmHg    pO2, Venous: 85 mmHg    HCO3, Venous: 13 mmol/L    Base Excess, Venous: -9.7 mmol/L    Oxygen Saturation, Venous: 96 %    Total CO2, Venous: 14 mmol/L    Blood Gas Source Venous: Venous            CAPILLARY BLOOD GLUCOSE      RADIOLOGY & ADDITIONAL TESTS:  Imaging Personally Reviewed:  [ ] YES  [ ] NO  < from: Xray Chest 1 View- PORTABLE-Urgent (10.10.18 @ 11:43) >  FINDINGS:     No focal lung consolidations, pleural effusions or pneumothorax.  Cardiomediastinal silhouette is normal.      IMPRESSION:     No consolidation.    < end of copied text >      < from: Xray Tibia + Fibula 2 Views, Left (10.09.18 @ 14:45) >  MPRESSION:   There is a large rounded soft tissue lesion along the posterior aspect of   the calf at the level of the distal tibia measuring 7.2 cm in the   craniocaudal dimension. There is no soft tissue gas. There is no adjacent   cortical erosions or periosteal reaction. There is diffuse swelling of   the lower extremity and foot.  No acute fracture is visualized. There are old fractures of the distal   tibia and fibula with intramedullary simone and fixation plate and screws.   There is no evidence of hardware complication. There is an old fracture   of the fifth metatarsal shaft.       < end of copied text >    < from: CT Lower Extremity No Cont, Left (10.09.18 @ 19:43) >  FINDINGS:    OSSEOUS STRUCTURES: The patient is status post ORIF of the tibia with   intramedullary rods with proximal and distal fixation screws.   Additionally, there is a distal lateral plate and screw fixation of the   fibula. A single medial malleolar screw is noted. The hardware bridges   old fractures that are healed. There is moderate medial and mild lateral   knee compartment narrowing. There are chronic fractures of the base of   the fourth and proximal fifth metatarsals. No cortical erosion or   destruction is appreciated. No acute fracture is noted.  SYNOVIUM/ JOINT FLUID: There is no knee joint effusion. No large ankle   joint effusion is seen.  TENDONS: The extensor mechanism is intact. The tendons about the ankle   are normal.  MUSCLES: There is severe atrophy of the calf and distal thigh   musculature. No intramuscular edema is identified.  NEUROVASCULAR STRUCTURES: Neurovascular structures are preserved.  SUBCUTANEOUS SOFT TISSUES: There is soft tissue swelling about the calf.   No drainable fluid collection is identified. There is mild skin   irregularity along the anteromedial distal calf.    3-D reformatted imaging confirms these findings.    IMPRESSION:    Moderate circumferential soft tissue swelling with skin thickening,   nonspecific but can be seen with cellulitis. No drainable fluid   collection.  No CT evidence for osteomyelitis.  Severe calf and distal thigh muscle atrophy.    < end of copied text >      HEALTH ISSUES - PROBLEM Dx:  Cellulitis of left lower extremity: Cellulitis of left lower extremity  Dysphagia: Dysphagia  Prophylactic measure: Prophylactic measure  Polymyositis: Polymyositis  Hyponatremia: Hyponatremia  Metabolic acidosis: Metabolic acidosis  MAI (acute kidney injury): MAI (acute kidney injury)  Hematoma of left lower extremity, sequela: Hematoma of left lower extremity, sequela  Severe sepsis: Severe sepsis

## 2018-10-12 NOTE — SWALLOW BEDSIDE ASSESSMENT ADULT - ADDITIONAL RECOMMENDATIONS
Consider Palliative care consult per d/w Medical team. Consider dietitian and GI consult; Medical team also to consider Palliative care consult.

## 2018-10-12 NOTE — PROGRESS NOTE ADULT - PROBLEM SELECTOR PLAN 4
VBG pH 7.37, , lactate 3.3, anion gap 27 suggestive of metabolic acidosis w/ anion gap 2/2 lactic acidosis and uremia 2/2 severe sepsis   -Trend labs for improvement in BUN, lactate, anion gap  -IVF, hydration Anion gap improving, initially 27 on admission now 18. Likely 2/2 resoving lactic acidosis and renal dysfunction  - Overall gap improving  - PO hydration as tolerated

## 2018-10-12 NOTE — CHART NOTE - NSCHARTNOTEFT_GEN_A_CORE
Podiatry consulted just before rapid response was called for bleeding on left leg dressing.  Upon arrival the dressing had been removed and manual pressure was being held on both posteromedial leg, and dorsal foot wounds. A steady ooze was noted from the central aspect leg wound at the location of 1 of the 2mm punch biopsy sites that had been performed earlier that day. Blood flow was noted to be a steady ooze, and not pulsatile. Surgiflo was used to achieve hemostasis at the central posteromedial leg wound, and manual compression was held on the leg wound. After hemostasis was achieved the dorsal foot wound and posteromedial leg wounds were redressed with 4x4 gauze, ABD, patrick, and tape. No strikethrough to the dressing was noted when the rapid response was complete.    Earlier in the day, around 11:30 am, 2- 2 mm punch biopsies had been obtained from the left posteromedial leg wound, and 2- 2 mm punch biopsies were also obtained from the left dorsal foot wound. After the biopsies were taken bleeding was well controlled, and the leg was redressed.

## 2018-10-12 NOTE — PROGRESS NOTE ADULT - PROBLEM SELECTOR PLAN 1
Pt with traumatic LLE ulcers and hematoma following fall, now admitted for severe sepsis 2/2 superimposing cellulitis and infected hematoma refractory to outpatient abx  - currently with erythema and swelling extending up to the mid-thigh  - CT LLE (10/9) showing likely infection limited to soft tissue, with no overt deep tissue infection  - wound ctx on admission grown variable GNR  - Tx w/ vanc and zosyn, renal dosing for vanc  - given elevated ESR/CRP and persistent pain, will consider further eval for OM  - hba1c wnl  - ID consult Pt with traumatic LLE ulcers and hematoma following fall, now admitted for severe sepsis 2/2 superimposing cellulitis and infected hematoma refractory to outpatient abx  - currently with erythema and swelling extending up to the mid-thigh  - CT LLE (10/9) showing likely infection limited to soft tissue, with no overt deep tissue infection  - wound ctx on admission grown variable GNR, awaiting speciation  - Tc/w zosyn, holding vanc given elevated trough  - given elevated ESR/CRP and persistent pain, will consider further eval for OM  - hba1c wnl  - ID consult

## 2018-10-12 NOTE — SWALLOW BEDSIDE ASSESSMENT ADULT - ASR SWALLOW RECOMMEND DIAG
Pt does not consent to object testing at this time.   Should a change in Pt's status occur and Pt consents to further swallowing evaluation, request MD to order FEES exam; SLP to schedule exam upon receipt of MD order.

## 2018-10-12 NOTE — PROGRESS NOTE ADULT - ATTENDING COMMENTS
severe sepsis 2/2 cellulitis,  in setting of LE ulceration/wound, podiatry following for wound care. No CT evidence of osteomyelitis. complicated by MAI with anion gap metabolic acidosis, hyponatremia, lactic acidosis all likely 2/2 sepsis and now improving. continue IV fluid resuscitation and broad spectrum antibiotics (vanc zosyn). ID consult following. continue vanco and zosyn. follow up podiatry recs. found to have DVT today and started on hep gtt for treatment in setting of small hematoma previously seen on CT. heparin gtt held prior to biopsy of LE and discontinued thereafter.. however this evening pt found to be bleeding from leg wound and hypotensive- see RRT note for details. Advanced care planning discussed with patient- she wants NO escalation of care. IVF and antibiotics OK. Pt received FFP but now refusing pRBCs. She understands her condition is treatable, but given her poor quality of life 2/2 myositis she does not want aggressive treatment. Questionable small pneumoperitoneum found on AXR during RRT- pt with benign abdominal exam. Refusing further work up. Will continue with serial abdominal exams and IV dilaudid prn for pain if symptoms should occur.

## 2018-10-12 NOTE — PROGRESS NOTE ADULT - PROBLEM SELECTOR PLAN 7
-Pt states uncertain of diagnosis, per PMD records PMH of polymyositis, dermatomyositis, CREST syndrome  -On prednisone 40mg daily since 3/2018- now tapering stress dose steroids in setting of sepsis.  -Follows Dr. Renae Lopez ([phone]) will contact for collateral - DVT prophylaxis: holding pharm VTE in the setting of large hematoma  - GI prophylaxis: Pantoprazole PO in the setting of chronic steroid use

## 2018-10-13 DIAGNOSIS — K66.8 OTHER SPECIFIED DISORDERS OF PERITONEUM: ICD-10-CM

## 2018-10-13 DIAGNOSIS — F43.21 ADJUSTMENT DISORDER WITH DEPRESSED MOOD: ICD-10-CM

## 2018-10-13 DIAGNOSIS — R57.8 OTHER SHOCK: ICD-10-CM

## 2018-10-13 LAB
ANION GAP SERPL CALC-SCNC: 19 MMOL/L — HIGH (ref 5–17)
BUN SERPL-MCNC: 102 MG/DL — HIGH (ref 7–23)
CALCIUM SERPL-MCNC: 8.1 MG/DL — LOW (ref 8.4–10.5)
CHLORIDE SERPL-SCNC: 104 MMOL/L — SIGNIFICANT CHANGE UP (ref 96–108)
CO2 SERPL-SCNC: 12 MMOL/L — LOW (ref 22–31)
CREAT SERPL-MCNC: 2.79 MG/DL — HIGH (ref 0.5–1.3)
GLUCOSE SERPL-MCNC: 198 MG/DL — HIGH (ref 70–99)
HCT VFR BLD CALC: 25 % — LOW (ref 34.5–45)
HGB BLD-MCNC: 8.7 G/DL — LOW (ref 11.5–15.5)
MCHC RBC-ENTMCNC: 31.7 PG — SIGNIFICANT CHANGE UP (ref 27–34)
MCHC RBC-ENTMCNC: 34.7 GM/DL — SIGNIFICANT CHANGE UP (ref 32–36)
MCV RBC AUTO: 91.3 FL — SIGNIFICANT CHANGE UP (ref 80–100)
PLATELET # BLD AUTO: 236 K/UL — SIGNIFICANT CHANGE UP (ref 150–400)
POTASSIUM SERPL-MCNC: 3.8 MMOL/L — SIGNIFICANT CHANGE UP (ref 3.5–5.3)
POTASSIUM SERPL-SCNC: 3.8 MMOL/L — SIGNIFICANT CHANGE UP (ref 3.5–5.3)
RBC # BLD: 2.74 M/UL — LOW (ref 3.8–5.2)
RBC # FLD: 13 % — SIGNIFICANT CHANGE UP (ref 10.3–14.5)
SODIUM SERPL-SCNC: 135 MMOL/L — SIGNIFICANT CHANGE UP (ref 135–145)
VANCOMYCIN FLD-MCNC: 13.5 UG/ML — SIGNIFICANT CHANGE UP
WBC # BLD: 15.3 K/UL — HIGH (ref 3.8–10.5)
WBC # FLD AUTO: 15.3 K/UL — HIGH (ref 3.8–10.5)

## 2018-10-13 PROCEDURE — 99497 ADVNCD CARE PLAN 30 MIN: CPT | Mod: GC

## 2018-10-13 PROCEDURE — 71250 CT THORAX DX C-: CPT | Mod: 26

## 2018-10-13 PROCEDURE — 99233 SBSQ HOSP IP/OBS HIGH 50: CPT | Mod: GC

## 2018-10-13 PROCEDURE — 99222 1ST HOSP IP/OBS MODERATE 55: CPT

## 2018-10-13 PROCEDURE — 74176 CT ABD & PELVIS W/O CONTRAST: CPT | Mod: 26

## 2018-10-13 PROCEDURE — 99221 1ST HOSP IP/OBS SF/LOW 40: CPT

## 2018-10-13 RX ORDER — OXYCODONE HYDROCHLORIDE 5 MG/1
5 TABLET ORAL EVERY 6 HOURS
Qty: 0 | Refills: 0 | Status: DISCONTINUED | OUTPATIENT
Start: 2018-10-13 | End: 2018-10-13

## 2018-10-13 RX ORDER — PIPERACILLIN AND TAZOBACTAM 4; .5 G/20ML; G/20ML
3.38 INJECTION, POWDER, LYOPHILIZED, FOR SOLUTION INTRAVENOUS EVERY 8 HOURS
Qty: 0 | Refills: 0 | Status: DISCONTINUED | OUTPATIENT
Start: 2018-10-13 | End: 2018-10-14

## 2018-10-13 RX ORDER — SODIUM CHLORIDE 9 MG/ML
1000 INJECTION INTRAMUSCULAR; INTRAVENOUS; SUBCUTANEOUS ONCE
Qty: 0 | Refills: 0 | Status: COMPLETED | OUTPATIENT
Start: 2018-10-13 | End: 2018-10-13

## 2018-10-13 RX ORDER — HYDROCORTISONE 20 MG
50 TABLET ORAL EVERY 8 HOURS
Qty: 0 | Refills: 0 | Status: DISCONTINUED | OUTPATIENT
Start: 2018-10-13 | End: 2018-10-14

## 2018-10-13 RX ADMIN — PANTOPRAZOLE SODIUM 40 MILLIGRAM(S): 20 TABLET, DELAYED RELEASE ORAL at 05:31

## 2018-10-13 RX ADMIN — PIPERACILLIN AND TAZOBACTAM 25 GRAM(S): 4; .5 INJECTION, POWDER, LYOPHILIZED, FOR SOLUTION INTRAVENOUS at 22:56

## 2018-10-13 RX ADMIN — PIPERACILLIN AND TAZOBACTAM 25 GRAM(S): 4; .5 INJECTION, POWDER, LYOPHILIZED, FOR SOLUTION INTRAVENOUS at 05:29

## 2018-10-13 RX ADMIN — HYDROMORPHONE HYDROCHLORIDE 2 MILLIGRAM(S): 2 INJECTION INTRAMUSCULAR; INTRAVENOUS; SUBCUTANEOUS at 05:30

## 2018-10-13 RX ADMIN — SODIUM CHLORIDE 100 MILLILITER(S): 9 INJECTION INTRAMUSCULAR; INTRAVENOUS; SUBCUTANEOUS at 05:29

## 2018-10-13 RX ADMIN — Medication 100 MILLIGRAM(S): at 05:29

## 2018-10-13 RX ADMIN — SODIUM CHLORIDE 1000 MILLILITER(S): 9 INJECTION INTRAMUSCULAR; INTRAVENOUS; SUBCUTANEOUS at 23:30

## 2018-10-13 RX ADMIN — PIPERACILLIN AND TAZOBACTAM 25 GRAM(S): 4; .5 INJECTION, POWDER, LYOPHILIZED, FOR SOLUTION INTRAVENOUS at 13:58

## 2018-10-13 RX ADMIN — Medication 50 MILLIGRAM(S): at 22:56

## 2018-10-13 RX ADMIN — Medication 50 MILLIGRAM(S): at 13:58

## 2018-10-13 RX ADMIN — HYDROMORPHONE HYDROCHLORIDE 1 MILLIGRAM(S): 2 INJECTION INTRAMUSCULAR; INTRAVENOUS; SUBCUTANEOUS at 23:35

## 2018-10-13 RX ADMIN — HYDROMORPHONE HYDROCHLORIDE 2 MILLIGRAM(S): 2 INJECTION INTRAMUSCULAR; INTRAVENOUS; SUBCUTANEOUS at 06:00

## 2018-10-13 RX ADMIN — SODIUM CHLORIDE 100 MILLILITER(S): 9 INJECTION INTRAMUSCULAR; INTRAVENOUS; SUBCUTANEOUS at 17:53

## 2018-10-13 NOTE — PROGRESS NOTE ADULT - PROBLEM SELECTOR PLAN 2
Last pm found to be bleeding profusely from LLE from punchy biopsy site, hypotensive, desatting. RRT called, difficult venous access, FFP given, hemostasis achieved. Pt deferred further inervention preferring comfort measures. Now stable on floors with sBP in 90s  - Hold lovenox, monitor coags  - Monitor cbc  - Comfort measures 10/12 found to be bleeding profusely from LLE from punchy biopsy site, hypotensive, desatting. RRT called, difficult venous access, FFP given, hemostasis achieved. Pt deferred further intervention preferring comfort measures. Now stable on floors with sBP in 90s  - Hold all anticoagulation, monitor coags  - Monitor cbc, will plan to transfuse for hgb < 7  -yesterday pt refused transfusion, however concern that pts depression is inhibiting her from making rationale medical decisions. Plan for capacity assessment today by psychiatry

## 2018-10-13 NOTE — PROGRESS NOTE ADULT - PROBLEM SELECTOR PLAN 4
Likely prerenal 2/2 sepsis and poor PO intake  - Cr downtrending  - c/w fluid hydration, address underlying infection as below Initially with hypotension, tachycardia and leukocytosis and lactate 3.3 BP, meeting severe sepsis criteria on admission responsive to fluids and stress dose steroids, likely SSTI   - c/w IVF  - c/w abx as above  - blood btx 10/9 NGTD  - downtitrate hydrocortisone to 50 mg q8 x 3 doses  - trend lactate

## 2018-10-13 NOTE — PROGRESS NOTE ADULT - ASSESSMENT
67 yo F w/ left leg hematoma w/ associated cellulitis  - Pt seen and examined  - Dressings clean dry and intact  - Cont IV abx  - Likely no surgical management needed at this time, no indication that there is a deeper infection or hardware issue, appears to be a superficial hematoma w/ associated cellulitis to the surrounding skin/ leg  - awaiting results of punch biopsies taken of both wounds to r/o pyoderma  - Will defer to ID for long term abx plan  - D/w attending

## 2018-10-13 NOTE — PROGRESS NOTE ADULT - PROBLEM SELECTOR PLAN 10
Extensive discussion had with patient. She is endorsing with her ongoing rheumatologic, poor quality of life, lack of familial support, inability to tolerate PO that she wishes to be DNR DNI. She endorses mild depression due to her disease process, deferring treatment but no suicidal or homicidal ideation. She is amenable to treating reversible causes. DNR DNI placed in chart

## 2018-10-13 NOTE — BEHAVIORAL HEALTH ASSESSMENT NOTE - HPI (INCLUDE ILLNESS QUALITY, SEVERITY, DURATION, TIMING, CONTEXT, MODIFYING FACTORS, ASSOCIATED SIGNS AND SYMPTOMS)
Patient is a 68 year old single female, domiciled, no pphx, with history of polymyositis on Prednisone 40 mg since 03/2018 daily and chronic left leg ulcer presented with left leg weakness and LLE ulcer. Patient has been getting progressively weaker for the past 2 weeks and sustained a fall 2 weeks ago and LE ulcer has been getting progressively worse with drainage of serosanguinous fluid from blisters. She recently went to [facility] after the fall and were discharged on PO antibiotics, pt now admitted for cellulitis, sepsis, consulted for depression, capacity to refuse CT. Pt c/o severe weakness, inability to walk due her medical issues. Pt reports some depressed mood, anhedonia, frustrated with her medical issues. Pt reports passive SI but no active si/i/p,no access to guns. Pt denies psychosis, anxiety, naomi. pt reports fair sleep, poor appetite, low energy. Pt states she was not sure what a CT scan is, and what it entails. She "remembers having them done before", but couldn't explain risks/benefits of tx. Pt couldn't explain the reasons why she needs CT abd/pelvis. Boyfriend at bedside, extremely intrusive, refusing to leave pt's bedside, answering questions for the pt. He doesn't think pt is depressed, doesn't have concerns for pt safety

## 2018-10-13 NOTE — PROGRESS NOTE ADULT - PROBLEM SELECTOR PLAN 9
Extensive discussion had with patient. She is endorsing with her ongoing rheumatologic, poor quality of life, lack of familial support, inability to tolerate PO that she wishes to be DNR DNI. She endorses mild depression due to her disease process, deferring treatment but no suicidal or homicidal ideation. She is amenable to treating reversible causes. DNR DNI placed in chart - DVT prophylaxis: holding pharm VTE in the setting of large hematoma  - GI prophylaxis: Pantoprazole PO in the setting of chronic steroid use

## 2018-10-13 NOTE — BEHAVIORAL HEALTH ASSESSMENT NOTE - NSBHCHARTREVIEWLAB_PSY_A_CORE FT
11.6   13.6  )-----------( 275      ( 12 Oct 2018 17:51 )             33.2     10-12    131<L>  |  100  |  100<H>  ----------------------------<  294<H>  4.0   |  10<LL>  |  2.00<H>    Ca    8.1<L>      12 Oct 2018 18:01  Phos  6.8     10-12  Mg     2.6     10-12    TPro  5.1<L>  /  Alb  2.1<L>  /  TBili  0.4  /  DBili  x   /  AST  13  /  ALT  16  /  AlkPhos  87  10-12

## 2018-10-13 NOTE — PROGRESS NOTE ADULT - PROBLEM SELECTOR PLAN 7
Pt states uncertain of diagnosis, per PMD records PMH of polymyositis, dermatomyositis, CREST syndrome  - On prednisone 40mg daily since 3/2018- now tapering stress dose steroids in setting of sepsis.  -Follows Dr. Renae Lopez ([phone]) will contact for collateral Pt endorses years of dysphagia but states she does better with thin liquids, likely 2/2 underlying rheumatological disorder with esophageal involvement. Seen by s/s today, patient endorses that she understands she is aspirating  and risk of aspiration pna on current diet but does not wish to deescalate diet.  - c/w current diet (pleasure feeds)  - Soft diet Pt endorses years of dysphagia but states she does better with thin liquids, likely 2/2 underlying rheumatological disorder with esophageal involvement. Seen by s/s , patient endorses that she understands she is aspirating  and risk of aspiration pna on current diet but does not wish to deescalate diet.  - c/w current diet (pleasure feeds)  - Soft diet  -capacity assessment by psych

## 2018-10-13 NOTE — BEHAVIORAL HEALTH ASSESSMENT NOTE - RISK ASSESSMENT
pt overall low risk for suicide attempt, no active si/i/p, no hx attempts, risk factors include passive si, med issues

## 2018-10-13 NOTE — BEHAVIORAL HEALTH ASSESSMENT NOTE - SUMMARY
Patient is a 68 year old single female, domiciled, no pphx, with history of polymyositis on Prednisone 40 mg since 03/2018 daily and chronic left leg ulcer presented with left leg weakness and LLE ulcer. Patient has been getting progressively weaker for the past 2 weeks and sustained a fall 2 weeks ago and LE ulcer has been getting progressively worse with drainage of serosanguinous fluid from blisters. She recently went to [facility] after the fall and were discharged on PO antibiotics, pt now admitted for cellulitis, sepsis, consulted for depression, capacity to refuse CT. Pt c/o severe weakness, inability to walk due her medical issues. Pt reports some depressed mood, anhedonia, frustrated with her medical issues. Pt reports passive SI but no active si/i/p. pt wasn't aware of the risks/benefits of ct abd, or the need for the scan.

## 2018-10-13 NOTE — CONSULT NOTE ADULT - ASSESSMENT
ASSESSMENT: Patient is a 68y old f, on chronic steroids for polymyositis, now found to have perforated diverticulitis with free air and free fluid    PLAN:    - Patient currently is refusing surgical intervention, stating that she understand that she would die if she does not have the surgery and does not want the surgery  - Discussed with medicine resident and psychiatry attending regarding patient's capacity, psych to evaluate the patient emergently tonight to evaluate for capacity regarding surgery  - Keep patient NPO, continue IV abx, IV Fluids  - all preop labs sent  - Patient discussed with surgical attending, Dr. Paz.  - Will follow up psychiatry discussion.     Jennifer Lerner MD PGY4  Acute Care Surgery, #[id number]

## 2018-10-13 NOTE — CHART NOTE - NSCHARTNOTEFT_GEN_A_CORE
During RRT yesterday, patient incidentally noted to have free air under diaphragm. Further evaluation today with CT- CAP revealed severe pneumoperitoneum with likely perforated diverticulitis. Patient was seen with surgery consult team and extensive explanation about current condition, intended surgical plan (ex lap, washout, partial colectomy, ostomy), risk of deferring surgery, and alternative treatment. Patient adamantly refused saying she would rather die and has been "praying for this". Psychiatry was called to clarify patient capacity as potential plan for 2PC consent if patient deemed not to have capacity. Patient seen again with psych, surgery and myself. Patient was able to repeat back condition, surgical option, lack of alternative options, and consequences of deferring surgery. Patient endorses she has been suffering and has no wish to be dependent on others including her boyfriend. Consensus agreement, patient has capacity to refuse. Psych and surgical evaluation greatly appreciated. Plan for continue DNR DNI, comfort measures. During RRT yesterday, patient incidentally noted to have free air under diaphragm. Further evaluation today with CT- CAP revealed severe pneumoperitoneum with likely perforated diverticulitis. Patient was seen with surgery consult team and extensive explanation about current condition, intended surgical plan (ex lap, washout, partial colectomy, ostomy), risk of deferring surgery, and alternative treatment. Patient adamantly refused saying she would rather die and has been "praying for this". Psychiatry was called to clarify patient capacity as potential plan for 2PC consent if patient deemed not to have capacity. Patient seen again with psych, surgery and myself. Patient was able to repeat back condition, surgical option, lack of alternative options, and consequences of deferring surgery. Patient endorses she has been suffering and has no wish to be dependent on others including her boyfriend. Consensus agreement, patient has capacity to refuse. Psych and surgical evaluation greatly appreciated. Plan for continue DNR DNI, comfort measures. Time spent in advanced care planning discussions 45 mins.

## 2018-10-13 NOTE — PROGRESS NOTE ADULT - PROBLEM SELECTOR PLAN 5
Anion gap improving, initially 27 on admission now 18. Likely 2/2 resoving lactic acidosis and renal dysfunction  - Overall gap improving  - PO hydration as tolerated Likely prerenal 2/2 sepsis and poor PO intake  - Cr downtrending  - c/w fluid hydration, address underlying infection as below Patient with worsening renal function in setting of volume loss and hypotension. LCr currently 2.79  - continue to monitor  - c/w IVF fluid hydration, address underlying infection as below Patient with worsening renal function in setting of volume loss and hypotension. LCr currently 2.79. suspect prerenal.  - continue to monitor  - c/w IVF fluid hydration, address underlying infection as below  -renally dose all meds  -bladder scan, renal sono if Cr worsens further   -renal c/s if Cr worsens further

## 2018-10-13 NOTE — BEHAVIORAL HEALTH ASSESSMENT NOTE - NSBHCHARTREVIEWINVESTIGATE_PSY_A_CORE FT
Ventricular Rate 105 BPM    Atrial Rate 105 BPM    P-R Interval 140 ms    QRS Duration 78 ms    Q-T Interval 334 ms    QTC Calculation(Bezet) 441 ms    P Axis 67 degrees    R Axis 43 degrees    T Axis 87 degrees    Diagnosis Line SINUS TACHYCARDIA  BIATRIAL ENLARGEMENT  NONSPECIFIC T WAVE ABNORMALITY  ABNORMAL ECG    Confirmed by ATTENDING, ED ([id number]),  FELIPA GO ([id number]) on 10/10/2018 7:24:43 AM

## 2018-10-13 NOTE — PROGRESS NOTE ADULT - SUBJECTIVE AND OBJECTIVE BOX
Patient is a 68y old  Female who presents with a chief complaint of Leg weakness (12 Oct 2018 19:24)       INTERVAL HPI/OVERNIGHT EVENTS:  Patient seen and evaluated at bedside.  Pt is resting comfortable in NAD. Denies N/V/F/C.    Allergies    penicillins (Unknown)    Intolerances        Vital Signs Last 24 Hrs  T(C): 36.3 (13 Oct 2018 04:28), Max: 36.3 (12 Oct 2018 14:14)  T(F): 97.3 (13 Oct 2018 04:28), Max: 97.3 (12 Oct 2018 14:14)  HR: 89 (13 Oct 2018 04:28) (89 - 114)  BP: 94/65 (13 Oct 2018 04:28) (94/65 - 114/84)  BP(mean): --  RR: 16 (13 Oct 2018 04:28) (16 - 17)  SpO2: 93% (13 Oct 2018 04:28) (93% - 95%)    LABS:                        11.6   13.6  )-----------( 275      ( 12 Oct 2018 17:51 )             33.2     10-12    131<L>  |  100  |  100<H>  ----------------------------<  294<H>  4.0   |  10<LL>  |  2.00<H>    Ca    8.1<L>      12 Oct 2018 18:01  Phos  6.8     10-12  Mg     2.6     10-12    TPro  5.1<L>  /  Alb  2.1<L>  /  TBili  0.4  /  DBili  x   /  AST  13  /  ALT  16  /  AlkPhos  87  10-12    PT/INR - ( 12 Oct 2018 17:51 )   PT: 13.4 sec;   INR: 1.24 ratio         PTT - ( 12 Oct 2018 17:51 )  PTT:42.4 sec    CAPILLARY BLOOD GLUCOSE          Lower Extremity Physical Exam:  dressing clean dry and intact with no strikethrough

## 2018-10-13 NOTE — PROGRESS NOTE ADULT - ASSESSMENT
Patient is a 68y old  Female Hx multiple falls, LLE ankle and leg fracture s/p ORIF w/ rods and screws, polymyositis on chronic solumedrol 40mg daily who presents w/ a LLE ulceration and worsening erythema on outpt abx found to be tachycardic w/ leukocytosis and hypotension responsive to fluids and MAI concerning for severe sepsis 2/2 cellulitis course c/b LLE hemorrhage s/p punch biopsy w/ associated hypotension concerning for hemorrhagic shock now stable though w/ soft BPs.

## 2018-10-13 NOTE — BEHAVIORAL HEALTH ASSESSMENT NOTE - NSBHCHARTREVIEWVS_PSY_A_CORE FT
Vital Signs Last 24 Hrs  T(C): 36.4 (13 Oct 2018 14:15), Max: 36.4 (13 Oct 2018 14:15)  T(F): 97.5 (13 Oct 2018 14:15), Max: 97.5 (13 Oct 2018 14:15)  HR: 93 (13 Oct 2018 14:15) (89 - 93)  BP: 108/72 (13 Oct 2018 14:15) (94/65 - 108/72)  BP(mean): --  RR: 17 (13 Oct 2018 14:15) (16 - 17)  SpO2: 91% (13 Oct 2018 14:15) (91% - 93%)

## 2018-10-13 NOTE — CONSULT NOTE ADULT - ATTENDING COMMENTS
Agree with above. Pt refusing surgery, recommended ex lap, likely bowel resection and ostomy for perforated diverticulitis. Continue abx, fluid resuscitation.

## 2018-10-13 NOTE — PROGRESS NOTE ADULT - PROBLEM SELECTOR PLAN 3
Initially with hypotension, tachycardia and leukocytosis and lactate 3.3 BP, meeting severe sepsis criteria on admission responsive to fluids and stress dose steroids, likely SSTI   - c/w IVF  - c/w abx as above  - blood btx 10/9 NGTD  - downtitrate hydrocortisone to 50 mg q8 x 3 doses  - trend lactate 10/12 cxr showed pneumoperitoneum  - Radiology rec CT to f/u, offered to pt but deferred for time being  - Will ask pt again once boyfriend present 10/12 cxr showed pneumoperitoneum  - Radiology rec CT to f/u, will get non-con in setting of elevated Cr 10/12 cxr showed pneumoperitoneum  - Radiology rec CT to f/u, will get non-con in setting of elevated Cr  -pt refused initially, having capacity assessed by psychiatry

## 2018-10-13 NOTE — PROGRESS NOTE ADULT - ATTENDING COMMENTS
severe sepsis 2/2 cellulitis,  in setting of LE ulceration/wound, podiatry following for wound care. No CT evidence of osteomyelitis. complicated by MAI with anion gap metabolic acidosis, hyponatremia, lactic acidosis all likely 2/2 sepsis. continue IV fluid resuscitation and broad spectrum antibiotics (vanc zosyn). ID consult following. continue vanco and zosyn. follow up podiatry recs. found to have DVT and started on hep gtt for treatment in setting of small hematoma previously seen on CT. heparin gtt held prior to biopsy of LE and discontinued thereafter. Hospital course complicated by hemorrhagic shock / acute blood loss anemia on 10/12. Hemostasis achieved and pt subsequently responded to IVF resuscitation. pt refused escalation of care beyond antibiotics and IVF. Refused blood products. DNR/DNI signed earlier in hospitalization. Pt noted to have pneumoperitoneum on CXR during RRT, however refused CT chest as well. Today upon further assessment I have concerns pts depression is clouding/impairing her judgement to make rationale medical decisions. She continues to refuse CT chest. Psychiatry consulted who deemed pt unable to refuse CT scan to further work up the questionable pneumoperitoneum. MAI worsening today- check urine lytes, bolus IVF, monitor urine output, suspect all prerenal. Metabolic acidosis 2/2 sepsis, renal failure- cont ivf. if worsens tomorrow will call renal c/s. severe sepsis 2/2 cellulitis,  in setting of LE ulceration/wound, podiatry following for wound care. No CT evidence of osteomyelitis. complicated by MAI with anion gap metabolic acidosis, hyponatremia, lactic acidosis all likely 2/2 sepsis. continue IV fluid resuscitation and broad spectrum antibiotics (vanc zosyn). ID consult following. continue vanco and zosyn. follow up podiatry recs. found to have DVT and started on hep gtt for treatment in setting of small hematoma previously seen on CT. heparin gtt held prior to biopsy of LE and discontinued thereafter. Hospital course complicated by hemorrhagic shock / acute blood loss anemia on 10/12. Hemostasis achieved and pt subsequently responded to IVF resuscitation. pt refused escalation of care beyond antibiotics and IVF. Refused blood products. DNR/DNI signed earlier in hospitalization. Pt noted to have pneumoperitoneum on CXR during RRT, however refused CT chest as well. Today upon further assessment I have concerns pts depression is clouding/impairing her judgement to make rationale medical decisions. She continues to refuse CT chest/abdomen and we have concerns for viscus perforation. Psychiatry consulted who deemed pt unable to refuse CT scan to further work up the questionable pneumoperitoneum. MAI worsening today- check urine lytes, bolus IVF, monitor urine output, suspect all prerenal. Metabolic acidosis 2/2 sepsis, renal failure- cont ivf. if worsens tomorrow will call renal c/s.

## 2018-10-13 NOTE — BEHAVIORAL HEALTH ASSESSMENT NOTE - NSBHCHARTREVIEWIMAGING_PSY_A_CORE FT
EXAM:  CT LWR EXT LT                            PROCEDURE DATE:  10/09/2018            INTERPRETATION:  CT LWR EXT LT dated 10/9/2018 7:43 PM     INDICATION: Left lower extremity pain and redness for one week.    COMPARISON: Tibia and fibula radiographs dated 10/90/18 and foot MRI   dated 10/9/2018    TECHNIQUE: CT imaging of the lower extremity was performed. The data was   reformatted in the axial, coronal, and sagittal planes. Additionally, 3-D   reformatted imaging was created at a separateworkstation.    FINDINGS:    OSSEOUS STRUCTURES: The patient is status post ORIF of the tibia with   intramedullary rods with proximal and distal fixation screws.   Additionally, there is a distal lateral plate and screw fixation of the   fibula. A single medial malleolar screw is noted. The hardware bridges   old fractures that are healed. There is moderate medial and mild lateral   knee compartment narrowing. There are chronic fractures of the base of   the fourth and proximal fifth metatarsals. No cortical erosion or   destruction is appreciated. No acute fracture is noted.  SYNOVIUM/ JOINT FLUID: There is no knee joint effusion. No large ankle   joint effusion is seen.  TENDONS: The extensor mechanism is intact. The tendons about the ankle   are normal.  MUSCLES: There is severe atrophy of the calf and distal thigh   musculature. No intramuscular edema is identified.  NEUROVASCULAR STRUCTURES: Neurovascular structures are preserved.  SUBCUTANEOUS SOFT TISSUES: There is soft tissue swelling about the calf.   No drainable fluid collection is identified. There is mild skin   irregularity along the anteromedial distal calf.

## 2018-10-13 NOTE — BEHAVIORAL HEALTH ASSESSMENT NOTE - NSBHCONSULTMEDS_PSY_A_CORE FT
pt not interested in ssris at this time. pt does not have capacity to refuse ct abd pelvis,pt doesn't understand risks/benefits, doesn't appreciate the severity of her condition. would defer to hcp, next of kin.

## 2018-10-13 NOTE — CONSULT NOTE ADULT - ASSESSMENT
Pt was able to explain correctly that she had a "hole in her bowel that is leaking air and feces" and that "they cut me open and turn it into a bag". Pt was able to articulate understanding that this complication will result in infection, pain, suffering, and high likelihood of death. Pt was able to articulate that the alternative of treatment with abx would not be effective and would still result in death. Pt explains that she prefers death and the pain/suffering that will precede it, should she not have the surgery, because of her debilitating pain 2/2 polymyositis. Pt says that she is tired of her quality of life and to worsen it with surgery is not something she wants. She says she understands the doctors' recommendation and that other people may be sad upon her death, but still refuses saying, "they are not me, I'm suffering". Pt understands the medical condition requiring treatment, the proposed treatment along with it's benefits/risks, it's alternative's benefits/risks including death, and the consequences of refusing treatment. Pt previously endorsed passive SI with no active SI/I/P. Reports some depressed mood in context of her medical conditions. Pt denies psychotic or manic sx. She is alert and oriented x 4. Pt does have the capacity to refuse the proposed treatment at this time.

## 2018-10-13 NOTE — PROGRESS NOTE ADULT - PROBLEM SELECTOR PLAN 8
- DVT prophylaxis: holding pharm VTE in the setting of large hematoma  - GI prophylaxis: Pantoprazole PO in the setting of chronic steroid use Pt states uncertain of diagnosis, per PMD records PMH of polymyositis, dermatomyositis, CREST syndrome  - On prednisone 40mg daily since 3/2018- now tapering stress dose steroids in setting of sepsis.  - Follows Dr. Renae Lopez ([phone]) will contact for collateral Pt states uncertain of diagnosis, per PMD records PMH of polymyositis, dermatomyositis, CREST syndrome  - On prednisone 40mg daily since 3/2018- now tapering stress dose steroids in setting of sepsis.  - Rheum c/s on Monday 10/15, will send off rheum panel since no office visit for years  - Follows Dr. Renae Lopez ([phone]) will contact for collateral

## 2018-10-13 NOTE — PROGRESS NOTE ADULT - PROBLEM SELECTOR PLAN 6
Pt endorses years of dysphagia but states she does better with thin liquids, likely 2/2 underlying rheumatological disorder with esophageal involvement. Seen by s/s today, patient endorses that she understands she is aspirating  and risk of aspiration pna on current diet but does not wish to deescalate diet.  - c/w current diet (pleasure feeds)  - Soft diet Anion gap improving, initially 27 on admission now 18. Likely 2/2 resoving lactic acidosis and renal dysfunction  - Overall gap improving  - PO hydration as tolerated Anion gap improving, initially 27 with interval improvement, but now worsening 2/2 hypotension and volume loss with lactic acidosis and renal dysfxn  - c/w IVF NS @ 100 cc  - trend lactate and BMP

## 2018-10-13 NOTE — PROGRESS NOTE ADULT - PROBLEM SELECTOR PLAN 1
Pt with traumatic LLE ulcers and hematoma following fall, now admitted for severe sepsis 2/2 superimposing cellulitis and infected hematoma refractory to outpatient abx  - currently with swelling and erythema now resolved.   - CT LLE (10/9) showing likely infection limited to soft tissue, with no overt deep tissue infection  - wound ctx on admission grown variable GNR, awaiting speciation  - Tc/w zosyn, holding vanc given elevated trough  - given elevated ESR/CRP and persistent pain, will consider further eval for OM  - hba1c wnl  - ID consult\  - s/p punch biopsy per podiatry Pt with traumatic LLE ulcers and hematoma following fall, now admitted for severe sepsis 2/2 superimposing cellulitis and infected hematoma refractory to outpatient abx  - currently with swelling and erythema now improving  - CT LLE (10/9) showing likely infection limited to soft tissue, with no overt deep tissue infection  - wound ctx on admission grown variable GNR, awaiting biopsies sent 10/12  - Tc/w zosyn, vancomycin  - per ID can consider MRI as clinically indicated to r/o OM. Per podiatry they believe this infection superficial.  - hba1c wnl  - ID consult following  - s/p punch biopsy per podiatry (10/12)  -continue pain control, renally dosed

## 2018-10-13 NOTE — PROGRESS NOTE ADULT - SUBJECTIVE AND OBJECTIVE BOX
HIWOT SMILEY  68y  Female      Patient is a 68y old  Female Hx multiple falls, LLE ankle and leg fracture s/p ORIF w/ rods and screws, polymyositis on chronic solumedrol 40mg daily who presents with a chief complaint of Leg ulceration and erythema.    INTERVAL HPI/OVERNIGHT EVENTS:    RRT called on pt last pm. This am pt endorses pain well controlled. Denies any n/v, f/c, sob, CP.      PHYSICAL EXAM:  Vital Signs Last 24 Hrs  T(C): 36.3 (13 Oct 2018 04:28), Max: 36.3 (12 Oct 2018 14:14)  T(F): 97.3 (13 Oct 2018 04:28), Max: 97.3 (12 Oct 2018 14:14)  HR: 89 (13 Oct 2018 04:28) (89 - 114)  BP: 94/65 (13 Oct 2018 04:28) (94/65 - 114/84)  BP(mean): --  RR: 16 (13 Oct 2018 04:28) (16 - 17)  SpO2: 93% (13 Oct 2018 04:28) (93% - 95%)    GENERAL: NAD, well-groomed, well developed  HEAD:  Atraumatic, Normocephalic, b/l facial droop  EYES: EOMI, PERRLA, conjunctiva and sclera clear  ENMT: No tonsillar erythema, exudates, or enlargement; Moist mucous membranes, Good dentition, No lesions  NECK: Supple, No JVD, Normal thyroid  NERVOUS SYSTEM:  Alert & Oriented X3, Good concentration; Motor Strength 4/5 B/L upper and lower extremities; DTRs 2+ intact and symmetric  CHEST/LUNG: Clear to auscultation bilaterally; No rales, rhonchi, wheezing, or rubs  HEART: Regular rate and rhythm; No murmurs, rubs, or gallops  ABDOMEN: Obese, Soft, Nontender, Nondistended; Bowel sounds present  EXTREMITIES:  2+ Peripheral Pulses in b/l UE, and RLE, unable to asses DP or PT pulses in LLE due to wrap, <2 sec cap refill b/l toes. LLE wrapped from proximal toes to distal leg. Dressing w/ no obvious drainage or bleeding. RLE w/ 2+ pitting edema to knee.  LYMPH: No lymphadenopathy noted    Consultant(s) Notes Reviewed:  [x ] YES  [ ] NO  Care Discussed with Consultants/Other Providers [ x] YES  [ ] NO    Medications  MEDICATIONS  (STANDING):  cefepime   IVPB 2000 milliGRAM(s) IV Intermittent every 24 hours  hydrocortisone sodium succinate Injectable 100 milliGRAM(s) IV Push every 8 hours  influenza   Vaccine 0.5 milliLiter(s) IntraMuscular once  pantoprazole    Tablet 40 milliGRAM(s) Oral before breakfast  sodium chloride 0.9%. 1000 milliLiter(s) (100 mL/Hr) IV Continuous <Continuous>    MEDICATIONS  (PRN):  acetaminophen   Tablet .. 975 milliGRAM(s) Oral every 6 hours PRN Mild Pain (1 - 3)  traMADol 25 milliGRAM(s) Oral every 8 hours PRN Moderate Pain (4 - 6)      LABS:                          11.6   13.6  )-----------( 275      ( 12 Oct 2018 17:51 )             33.2   10-12    131<L>  |  100  |  100<H>  ----------------------------<  294<H>  4.0   |  10<LL>  |  2.00<H>    Ca    8.1<L>      12 Oct 2018 18:01  Phos  6.8     10-12  Mg     2.6     10-12    TPro  5.1<L>  /  Alb  2.1<L>  /  TBili  0.4  /  DBili  x   /  AST  13  /  ALT  16  /  AlkPhos  87  10-12              CAPILLARY BLOOD GLUCOSE      RADIOLOGY & ADDITIONAL TESTS:  Imaging Personally Reviewed:  [ ] YES  [ ] NO  < from: Xray Chest 1 View- PORTABLE-Urgent (10.10.18 @ 11:43) >  FINDINGS:     No focal lung consolidations, pleural effusions or pneumothorax.  Cardiomediastinal silhouette is normal.      IMPRESSION:     No consolidation.    < end of copied text >      < from: Xray Tibia + Fibula 2 Views, Left (10.09.18 @ 14:45) >  MPRESSION:   There is a large rounded soft tissue lesion along the posterior aspect of   the calf at the level of the distal tibia measuring 7.2 cm in the   craniocaudal dimension. There is no soft tissue gas. There is no adjacent   cortical erosions or periosteal reaction. There is diffuse swelling of   the lower extremity and foot.  No acute fracture is visualized. There are old fractures of the distal   tibia and fibula with intramedullary simone and fixation plate and screws.   There is no evidence of hardware complication. There is an old fracture   of the fifth metatarsal shaft.       < end of copied text >    < from: CT Lower Extremity No Cont, Left (10.09.18 @ 19:43) >  FINDINGS:    OSSEOUS STRUCTURES: The patient is status post ORIF of the tibia with   intramedullary rods with proximal and distal fixation screws.   Additionally, there is a distal lateral plate and screw fixation of the   fibula. A single medial malleolar screw is noted. The hardware bridges   old fractures that are healed. There is moderate medial and mild lateral   knee compartment narrowing. There are chronic fractures of the base of   the fourth and proximal fifth metatarsals. No cortical erosion or   destruction is appreciated. No acute fracture is noted.  SYNOVIUM/ JOINT FLUID: There is no knee joint effusion. No large ankle   joint effusion is seen.  TENDONS: The extensor mechanism is intact. The tendons about the ankle   are normal.  MUSCLES: There is severe atrophy of the calf and distal thigh   musculature. No intramuscular edema is identified.  NEUROVASCULAR STRUCTURES: Neurovascular structures are preserved.  SUBCUTANEOUS SOFT TISSUES: There is soft tissue swelling about the calf.   No drainable fluid collection is identified. There is mild skin   irregularity along the anteromedial distal calf.    3-D reformatted imaging confirms these findings.    IMPRESSION:    Moderate circumferential soft tissue swelling with skin thickening,   nonspecific but can be seen with cellulitis. No drainable fluid   collection.  No CT evidence for osteomyelitis.  Severe calf and distal thigh muscle atrophy.    < end of copied text >      < from: [facility] Duplex Lower Ext Vein Scan, Left (10.11.18 @ 20:27) >  IMPRESSION:     Extensive above and below the knee LEFT femoropopliteal DVT as above.     Findings were discussed by the technologist with Dr. Olson    < end of copied text >      < from: Xray Chest 1 View-PORTABLE IMMEDIATE (10.12.18 @ 17:39) >  Findings: The cardiac silhouette is normal in size. The lung volumes are   low. There are no focal consolidations or pleural effusions. There is   probable free air beneath the diaphragms.    Impression: Low lung volumes. Clear lungs. Probable pneumoperitoneum. CT   scan of the abdomen and pelvis is recommended.         Findings discussed with  Francis by Dr. Gloria Britton at 8:38 am   10/13/18.    < end of copied text >        HEALTH ISSUES - PROBLEM Dx:  Cellulitis of left lower extremity: Cellulitis of left lower extremity  Dysphagia: Dysphagia  Prophylactic measure: Prophylactic measure  Polymyositis: Polymyositis  Hyponatremia: Hyponatremia  Metabolic acidosis: Metabolic acidosis  MAI (acute kidney injury): MAI (acute kidney injury)  Hematoma of left lower extremity, sequela: Hematoma of left lower extremity, sequela  Severe sepsis: Severe sepsis HIWOT SMILEY  68y  Female      Patient is a 68y old  Female Hx multiple falls, LLE ankle and leg fracture s/p ORIF w/ rods and screws, polymyositis on chronic solumedrol 40mg daily who presents with a chief complaint of Leg ulceration and erythema.    INTERVAL HPI/OVERNIGHT EVENTS:    RRT called on pt last pm. This am pt endorses pain well controlled. Denies any n/v, f/c, sob, CP.  more lethargic today   endorses feelings of depression, refusing CT scan recommended to assess free air on cxr, pt also refused last night.   notes that her quality of life is poor and she doesnt want to live like this. she wishes we would let her die.    collateral obtained from pts boyfriend- no rheum follow up in 4-5 years. pt underwent workup in past without clear diagnosis.  has been on prednisone 40mg since march      PHYSICAL EXAM:  Vital Signs Last 24 Hrs  T(C): 36.3 (13 Oct 2018 04:28), Max: 36.3 (12 Oct 2018 14:14)  T(F): 97.3 (13 Oct 2018 04:28), Max: 97.3 (12 Oct 2018 14:14)  HR: 89 (13 Oct 2018 04:28) (89 - 114)  BP: 94/65 (13 Oct 2018 04:28) (94/65 - 114/84)  BP(mean): --  RR: 16 (13 Oct 2018 04:28) (16 - 17)  SpO2: 93% (13 Oct 2018 04:28) (93% - 95%)    GENERAL: depressed affect; slurred speech  HEAD:  Atraumatic, Normocephalic, b/l facial droop  EYES: EOMI, PERRLA, conjunctiva and sclera clear  ENMT: No tonsillar erythema, exudates, or enlargement; Moist mucous membranes, Good dentition, No lesions  NECK: Supple, No JVD, Normal thyroid  NERVOUS SYSTEM:  Alert & Oriented X3, Good concentration; Motor Strength 4/5 B/L upper and lower extremities; DTRs 2+ intact and symmetric  CHEST/LUNG: Clear to auscultation bilaterally; No rales, rhonchi, wheezing, or rubs  HEART: Regular rate and rhythm; No murmurs, rubs, or gallops  ABDOMEN: Obese, Soft, Nontender, Nondistended; Bowel sounds present  EXTREMITIES:  2+ Peripheral Pulses in b/l UE, and RLE, unable to asses DP or PT pulses in LLE due to wrap, <2 sec cap refill b/l toes. LLE wrapped from proximal toes to distal leg. Dressing w/ no obvious drainage or bleeding. RLE w/ 2+ pitting edema to knee.  LYMPH: No lymphadenopathy noted    Consultant(s) Notes Reviewed:  [x ] YES  [ ] NO  Care Discussed with Consultants/Other Providers [ x] YES  [ ] NO        LABS:                          11.6   13.6  )-----------( 275      ( 12 Oct 2018 17:51 )             33.2   10-12    131<L>  |  100  |  100<H>  ----------------------------<  294<H>  4.0   |  10<LL>  |  2.00<H>    Ca    8.1<L>      12 Oct 2018 18:01  Phos  6.8     10-12  Mg     2.6     10-12    TPro  5.1<L>  /  Alb  2.1<L>  /  TBili  0.4  /  DBili  x   /  AST  13  /  ALT  16  /  AlkPhos  87  10-12              CAPILLARY BLOOD GLUCOSE      RADIOLOGY & ADDITIONAL TESTS:  Imaging Personally Reviewed:  [ ] YES  [ ] NO  < from: Xray Chest 1 View- PORTABLE-Urgent (10.10.18 @ 11:43) >  FINDINGS:     No focal lung consolidations, pleural effusions or pneumothorax.  Cardiomediastinal silhouette is normal.      IMPRESSION:     No consolidation.    < end of copied text >      < from: Xray Tibia + Fibula 2 Views, Left (10.09.18 @ 14:45) >  MPRESSION:   There is a large rounded soft tissue lesion along the posterior aspect of   the calf at the level of the distal tibia measuring 7.2 cm in the   craniocaudal dimension. There is no soft tissue gas. There is no adjacent   cortical erosions or periosteal reaction. There is diffuse swelling of   the lower extremity and foot.  No acute fracture is visualized. There are old fractures of the distal   tibia and fibula with intramedullary simone and fixation plate and screws.   There is no evidence of hardware complication. There is an old fracture   of the fifth metatarsal shaft.       < end of copied text >    < from: CT Lower Extremity No Cont, Left (10.09.18 @ 19:43) >  FINDINGS:    OSSEOUS STRUCTURES: The patient is status post ORIF of the tibia with   intramedullary rods with proximal and distal fixation screws.   Additionally, there is a distal lateral plate and screw fixation of the   fibula. A single medial malleolar screw is noted. The hardware bridges   old fractures that are healed. There is moderate medial and mild lateral   knee compartment narrowing. There are chronic fractures of the base of   the fourth and proximal fifth metatarsals. No cortical erosion or   destruction is appreciated. No acute fracture is noted.  SYNOVIUM/ JOINT FLUID: There is no knee joint effusion. No large ankle   joint effusion is seen.  TENDONS: The extensor mechanism is intact. The tendons about the ankle   are normal.  MUSCLES: There is severe atrophy of the calf and distal thigh   musculature. No intramuscular edema is identified.  NEUROVASCULAR STRUCTURES: Neurovascular structures are preserved.  SUBCUTANEOUS SOFT TISSUES: There is soft tissue swelling about the calf.   No drainable fluid collection is identified. There is mild skin   irregularity along the anteromedial distal calf.    3-D reformatted imaging confirms these findings.    IMPRESSION:    Moderate circumferential soft tissue swelling with skin thickening,   nonspecific but can be seen with cellulitis. No drainable fluid   collection.  No CT evidence for osteomyelitis.  Severe calf and distal thigh muscle atrophy.    < end of copied text >      < from: [facility] Duplex Lower Ext Vein Scan, Left (10.11.18 @ 20:27) >  IMPRESSION:     Extensive above and below the knee LEFT femoropopliteal DVT as above.     Findings were discussed by the technologist with Dr. Olson    < end of copied text >      < from: Xray Chest 1 View-PORTABLE IMMEDIATE (10.12.18 @ 17:39) >  Findings: The cardiac silhouette is normal in size. The lung volumes are   low. There are no focal consolidations or pleural effusions. There is   probable free air beneath the diaphragms.    Impression: Low lung volumes. Clear lungs. Probable pneumoperitoneum. CT   scan of the abdomen and pelvis is recommended.         Findings discussed with  Francis by Dr. Gloria Britton at 8:38 am   10/13/18.    < end of copied text >        HEALTH ISSUES - PROBLEM Dx:  Cellulitis of left lower extremity: Cellulitis of left lower extremity  Dysphagia: Dysphagia  Prophylactic measure: Prophylactic measure  Polymyositis: Polymyositis  Hyponatremia: Hyponatremia  Metabolic acidosis: Metabolic acidosis  MAI (acute kidney injury): MAI (acute kidney injury)  Hematoma of left lower extremity, sequela: Hematoma of left lower extremity, sequela  Severe sepsis: Severe sepsis HIWOT SMILEY  68y  Female      Patient is a 68y old  Female Hx multiple falls, LLE ankle and leg fracture s/p ORIF w/ rods and screws, polymyositis on chronic solumedrol 40mg daily who presents with a chief complaint of Leg ulceration and erythema.    INTERVAL HPI/OVERNIGHT EVENTS:    RRT called on pt last pm. This am pt endorses pain well controlled. Denies any n/v, f/c, sob, CP, abdominal pain.  more lethargic today   endorses feelings of depression, refusing CT scan recommended to assess free air on cxr, pt also refused last night.   notes that her quality of life is poor and she doesnt want to live like this. she wishes we would let her die.    collateral obtained from pts boyfriend- no rheum follow up in 4-5 years. pt underwent workup in past without clear diagnosis.  has been on prednisone 40mg since march      PHYSICAL EXAM:  Vital Signs Last 24 Hrs  T(C): 36.3 (13 Oct 2018 04:28), Max: 36.3 (12 Oct 2018 14:14)  T(F): 97.3 (13 Oct 2018 04:28), Max: 97.3 (12 Oct 2018 14:14)  HR: 89 (13 Oct 2018 04:28) (89 - 114)  BP: 94/65 (13 Oct 2018 04:28) (94/65 - 114/84)  BP(mean): --  RR: 16 (13 Oct 2018 04:28) (16 - 17)  SpO2: 93% (13 Oct 2018 04:28) (93% - 95%)    GENERAL: depressed affect; slurred speech  HEAD:  Atraumatic, Normocephalic, b/l facial droop  EYES: EOMI, PERRLA, conjunctiva and sclera clear  ENMT: No tonsillar erythema, exudates, or enlargement; Moist mucous membranes, Good dentition, No lesions  NECK: Supple, No JVD, Normal thyroid  NERVOUS SYSTEM:  Alert & Oriented X3, Good concentration; Motor Strength 4/5 B/L upper and lower extremities; DTRs 2+ intact and symmetric  CHEST/LUNG: Clear to auscultation bilaterally; No rales, rhonchi, wheezing, or rubs  HEART: Regular rate and rhythm; No murmurs, rubs, or gallops  ABDOMEN: Obese, Soft, discomfort to palpation of epigastrum but no rebond or guarding, ; Bowel sounds present  EXTREMITIES:  2+ Peripheral Pulses in b/l UE, and RLE, unable to asses DP or PT pulses in LLE due to wrap, <2 sec cap refill b/l toes. LLE wrapped from proximal toes to distal leg. Dressing w/ no obvious drainage or bleeding. RLE w/ 2+ pitting edema to knee.  LYMPH: No lymphadenopathy noted    Consultant(s) Notes Reviewed:  [x ] YES  [ ] NO  Care Discussed with Consultants/Other Providers [ x] YES  [ ] NO        LABS:                          11.6   13.6  )-----------( 275      ( 12 Oct 2018 17:51 )             33.2   10-12    131<L>  |  100  |  100<H>  ----------------------------<  294<H>  4.0   |  10<LL>  |  2.00<H>    Ca    8.1<L>      12 Oct 2018 18:01  Phos  6.8     10-12  Mg     2.6     10-12    TPro  5.1<L>  /  Alb  2.1<L>  /  TBili  0.4  /  DBili  x   /  AST  13  /  ALT  16  /  AlkPhos  87  10-12              CAPILLARY BLOOD GLUCOSE      RADIOLOGY & ADDITIONAL TESTS:  Imaging Personally Reviewed:  [ ] YES  [ ] NO  < from: Xray Chest 1 View- PORTABLE-Urgent (10.10.18 @ 11:43) >  FINDINGS:     No focal lung consolidations, pleural effusions or pneumothorax.  Cardiomediastinal silhouette is normal.      IMPRESSION:     No consolidation.    < end of copied text >      < from: Xray Tibia + Fibula 2 Views, Left (10.09.18 @ 14:45) >  MPRESSION:   There is a large rounded soft tissue lesion along the posterior aspect of   the calf at the level of the distal tibia measuring 7.2 cm in the   craniocaudal dimension. There is no soft tissue gas. There is no adjacent   cortical erosions or periosteal reaction. There is diffuse swelling of   the lower extremity and foot.  No acute fracture is visualized. There are old fractures of the distal   tibia and fibula with intramedullary simone and fixation plate and screws.   There is no evidence of hardware complication. There is an old fracture   of the fifth metatarsal shaft.       < end of copied text >    < from: CT Lower Extremity No Cont, Left (10.09.18 @ 19:43) >  FINDINGS:    OSSEOUS STRUCTURES: The patient is status post ORIF of the tibia with   intramedullary rods with proximal and distal fixation screws.   Additionally, there is a distal lateral plate and screw fixation of the   fibula. A single medial malleolar screw is noted. The hardware bridges   old fractures that are healed. There is moderate medial and mild lateral   knee compartment narrowing. There are chronic fractures of the base of   the fourth and proximal fifth metatarsals. No cortical erosion or   destruction is appreciated. No acute fracture is noted.  SYNOVIUM/ JOINT FLUID: There is no knee joint effusion. No large ankle   joint effusion is seen.  TENDONS: The extensor mechanism is intact. The tendons about the ankle   are normal.  MUSCLES: There is severe atrophy of the calf and distal thigh   musculature. No intramuscular edema is identified.  NEUROVASCULAR STRUCTURES: Neurovascular structures are preserved.  SUBCUTANEOUS SOFT TISSUES: There is soft tissue swelling about the calf.   No drainable fluid collection is identified. There is mild skin   irregularity along the anteromedial distal calf.    3-D reformatted imaging confirms these findings.    IMPRESSION:    Moderate circumferential soft tissue swelling with skin thickening,   nonspecific but can be seen with cellulitis. No drainable fluid   collection.  No CT evidence for osteomyelitis.  Severe calf and distal thigh muscle atrophy.    < end of copied text >      < from: [facility] Duplex Lower Ext Vein Scan, Left (10.11.18 @ 20:27) >  IMPRESSION:     Extensive above and below the knee LEFT femoropopliteal DVT as above.     Findings were discussed by the technologist with Dr. Olson    < end of copied text >      < from: Xray Chest 1 View-PORTABLE IMMEDIATE (10.12.18 @ 17:39) >  Findings: The cardiac silhouette is normal in size. The lung volumes are   low. There are no focal consolidations or pleural effusions. There is   probable free air beneath the diaphragms.    Impression: Low lung volumes. Clear lungs. Probable pneumoperitoneum. CT   scan of the abdomen and pelvis is recommended.         Findings discussed with  Francis by Dr. Gloria Britton at 8:38 am   10/13/18.    < end of copied text >        HEALTH ISSUES - PROBLEM Dx:  Cellulitis of left lower extremity: Cellulitis of left lower extremity  Dysphagia: Dysphagia  Prophylactic measure: Prophylactic measure  Polymyositis: Polymyositis  Hyponatremia: Hyponatremia  Metabolic acidosis: Metabolic acidosis  MAI (acute kidney injury): MAI (acute kidney injury)  Hematoma of left lower extremity, sequela: Hematoma of left lower extremity, sequela  Severe sepsis: Severe sepsis

## 2018-10-14 LAB
CULTURE RESULTS: SIGNIFICANT CHANGE UP
CULTURE RESULTS: SIGNIFICANT CHANGE UP
DSDNA AB FLD-ACNC: <0.2 AI — SIGNIFICANT CHANGE UP
ENA JO1 AB SER-ACNC: <0.2 AI — SIGNIFICANT CHANGE UP
ENA SS-A AB FLD IA-ACNC: <0.2 AI — SIGNIFICANT CHANGE UP
SPECIMEN SOURCE: SIGNIFICANT CHANGE UP
SPECIMEN SOURCE: SIGNIFICANT CHANGE UP

## 2018-10-14 PROCEDURE — 99233 SBSQ HOSP IP/OBS HIGH 50: CPT | Mod: GC

## 2018-10-14 RX ORDER — HYDROCORTISONE 20 MG
50 TABLET ORAL EVERY 12 HOURS
Qty: 0 | Refills: 0 | Status: DISCONTINUED | OUTPATIENT
Start: 2018-10-14 | End: 2018-10-20

## 2018-10-14 RX ORDER — HYDROMORPHONE HYDROCHLORIDE 2 MG/ML
0.5 INJECTION INTRAMUSCULAR; INTRAVENOUS; SUBCUTANEOUS
Qty: 0 | Refills: 0 | Status: DISCONTINUED | OUTPATIENT
Start: 2018-10-14 | End: 2018-10-14

## 2018-10-14 RX ORDER — SODIUM CHLORIDE 9 MG/ML
1000 INJECTION INTRAMUSCULAR; INTRAVENOUS; SUBCUTANEOUS
Qty: 0 | Refills: 0 | Status: DISCONTINUED | OUTPATIENT
Start: 2018-10-14 | End: 2018-10-14

## 2018-10-14 RX ORDER — HYDROMORPHONE HYDROCHLORIDE 2 MG/ML
2 INJECTION INTRAMUSCULAR; INTRAVENOUS; SUBCUTANEOUS EVERY 4 HOURS
Qty: 0 | Refills: 0 | Status: DISCONTINUED | OUTPATIENT
Start: 2018-10-14 | End: 2018-10-15

## 2018-10-14 RX ORDER — HYDROMORPHONE HYDROCHLORIDE 2 MG/ML
1 INJECTION INTRAMUSCULAR; INTRAVENOUS; SUBCUTANEOUS
Qty: 0 | Refills: 0 | Status: DISCONTINUED | OUTPATIENT
Start: 2018-10-14 | End: 2018-10-15

## 2018-10-14 RX ADMIN — Medication 50 MILLIGRAM(S): at 06:32

## 2018-10-14 RX ADMIN — PIPERACILLIN AND TAZOBACTAM 25 GRAM(S): 4; .5 INJECTION, POWDER, LYOPHILIZED, FOR SOLUTION INTRAVENOUS at 06:33

## 2018-10-14 RX ADMIN — HYDROMORPHONE HYDROCHLORIDE 2 MILLIGRAM(S): 2 INJECTION INTRAMUSCULAR; INTRAVENOUS; SUBCUTANEOUS at 11:32

## 2018-10-14 RX ADMIN — HYDROMORPHONE HYDROCHLORIDE 2 MILLIGRAM(S): 2 INJECTION INTRAMUSCULAR; INTRAVENOUS; SUBCUTANEOUS at 11:50

## 2018-10-14 RX ADMIN — HYDROMORPHONE HYDROCHLORIDE 1 MILLIGRAM(S): 2 INJECTION INTRAMUSCULAR; INTRAVENOUS; SUBCUTANEOUS at 00:10

## 2018-10-14 RX ADMIN — Medication 50 MILLIGRAM(S): at 18:12

## 2018-10-14 NOTE — PROGRESS NOTE ADULT - PROBLEM SELECTOR PLAN 3
10/12 cxr showed pneumoperitoneum  - Radiology rec CT to f/u, will get non-con in setting of elevated Cr  -pt refused initially, having capacity assessed by psychiatry 10/12 found to be bleeding profusely from LLE from punchy biopsy site, hypotensive, desatting. RRT called, difficult venous access, FFP given, hemostasis achieved. Now stable on floors with sBP in 90s  - Hold all anticoagulation, monitor coags  - Monitor cbc, will plan to transfuse for hgb < 7  - Pt deferred further intervention preferring comfort measures. Pt with traumatic LLE ulcers and hematoma following fall, now admitted for severe sepsis 2/2 superimposing cellulitis and infected hematoma refractory to outpatient abx  - CT LLE (10/9) showing likely infection limited to soft tissue, with no overt deep tissue infection  - wound ctx on admission grown variable GNR, awaiting biopsies sent 10/12  - d/c vancomycin/zosyn per pt wishes  - Comfort measure only  - Pain control w/ Dilaudid 2 mg Q4H PRN and 1 mg Q2H breakthrough pain

## 2018-10-14 NOTE — PROGRESS NOTE ADULT - PROBLEM SELECTOR PLAN 9
- DVT prophylaxis: holding pharm VTE in the setting of large hematoma  - GI prophylaxis: Pantoprazole PO in the setting of chronic steroid use - DVT prophylaxis: holding pharm VTE in the setting of large hematoma  - GI prophylaxis: Pantoprazole PO in the setting of chronic steroid use  - Pt is DNR/DNI and is on comfort measures only. discontinued in setting of comfort care

## 2018-10-14 NOTE — PROGRESS NOTE ADULT - PROBLEM SELECTOR PLAN 6
Anion gap improving, initially 27 with interval improvement, but now worsening 2/2 hypotension and volume loss with lactic acidosis and renal dysfxn  - c/w IVF NS @ 100 cc  - trend lactate and BMP Anion gap improving, initially 27 with interval improvement, but now worsening 2/2 hypotension and volume loss with lactic acidosis and renal dysfxn multifactorial 2/2 sepsis/infection , hypotension and volume loss with lactic acidosis and renal dysfxn

## 2018-10-14 NOTE — PROGRESS NOTE ADULT - SUBJECTIVE AND OBJECTIVE BOX
Dr. Augusto Jackson  Internal Medicine PGY-1   Pager# [phone]    Patient is a 68y old  Female who presents with a chief complaint of Leg weakness (13 Oct 2018 21:25)      SUBJECTIVE / OVERNIGHT EVENTS: No acute overnight events. She was tachycardiac to 108, BP- 98/64, but afebrile.     MEDICATIONS  (STANDING):  hydrocortisone sodium succinate Injectable 50 milliGRAM(s) IV Push every 8 hours  influenza   Vaccine 0.5 milliLiter(s) IntraMuscular once  piperacillin/tazobactam IVPB. 3.375 Gram(s) IV Intermittent every 8 hours  sodium chloride 0.9%. 1000 milliLiter(s) (100 mL/Hr) IV Continuous <Continuous>    MEDICATIONS  (PRN):  HYDROmorphone  Injectable 1 milliGRAM(s) IV Push every 4 hours PRN Severe Pain (7 - 10)  ondansetron Injectable 4 milliGRAM(s) IV Push every 6 hours PRN Nausea and/or Vomiting      Vital Signs Last 24 Hrs  T(C): 36.3 (13 Oct 2018 22:54), Max: 36.4 (13 Oct 2018 14:15)  T(F): 97.3 (13 Oct 2018 22:54), Max: 97.5 (13 Oct 2018 14:15)  HR: 108 (13 Oct 2018 22:54) (93 - 108)  BP: 98/64 (13 Oct 2018 22:54) (98/64 - 108/72)  BP(mean): --  RR: 18 (13 Oct 2018 22:54) (17 - 18)  SpO2: 98% (13 Oct 2018 22:54) (91% - 98%)  CAPILLARY BLOOD GLUCOSE        I&O's Summary      PHYSICAL EXAM:  GENERAL: NAD, well-developed  HEAD:  Atraumatic, Normocephalic  EYES: EOMI, PERRLA, conjunctiva and sclera clear  NECK: Supple, No JVD  CHEST/LUNG: Clear to auscultation bilaterally; No wheeze  HEART: Regular rate and rhythm; No murmurs, rubs, or gallops  ABDOMEN: Soft, Nontender, Nondistended; Bowel sounds present  EXTREMITIES:  2+ Peripheral Pulses, No clubbing, cyanosis, or edema  PSYCH: AAOx3  NEUROLOGY: non-focal  SKIN: No rashes or lesions    LABS:                        8.7    15.3  )-----------( 236      ( 13 Oct 2018 16:53 )             25.0     10-13    135  |  104  |  102<H>  ----------------------------<  198<H>  3.8   |  12<L>  |  2.79<H>    Ca    8.1<L>      13 Oct 2018 16:53  Phos  6.8     10-12  Mg     2.6     10-12    TPro  5.1<L>  /  Alb  2.1<L>  /  TBili  0.4  /  DBili  x   /  AST  13  /  ALT  16  /  AlkPhos  87  10-12    PT/INR - ( 12 Oct 2018 17:51 )   PT: 13.4 sec;   INR: 1.24 ratio         PTT - ( 12 Oct 2018 17:51 )  PTT:42.4 sec          RADIOLOGY & ADDITIONAL TESTS:    Imaging Personally Reviewed:    Consultant(s) Notes Reviewed:      Care Discussed with Consultants/Other Providers: Dr. Augusto Jackson  Internal Medicine PGY-1   Pager# [phone]    Patient is a 68y old  Female who presents with a chief complaint of Leg weakness (13 Oct 2018 21:25)      SUBJECTIVE / OVERNIGHT EVENTS: No acute overnight events. She was tachycardiac to 108, BP- 98/64, but afebrile. Reports leg pain with positional changes, and RUQ/LUQ discomfort with cough. Denies SOB, fever, h/d/n. She one episode of vomiting. Had productive cough.     MEDICATIONS  (STANDING):  hydrocortisone sodium succinate Injectable 50 milliGRAM(s) IV Push every 8 hours  influenza   Vaccine 0.5 milliLiter(s) IntraMuscular once  piperacillin/tazobactam IVPB. 3.375 Gram(s) IV Intermittent every 8 hours  sodium chloride 0.9%. 1000 milliLiter(s) (100 mL/Hr) IV Continuous <Continuous>    MEDICATIONS  (PRN):  HYDROmorphone  Injectable 1 milliGRAM(s) IV Push every 4 hours PRN Severe Pain (7 - 10)  ondansetron Injectable 4 milliGRAM(s) IV Push every 6 hours PRN Nausea and/or Vomiting      Vital Signs Last 24 Hrs  T(C): 36.3 (13 Oct 2018 22:54), Max: 36.4 (13 Oct 2018 14:15)  T(F): 97.3 (13 Oct 2018 22:54), Max: 97.5 (13 Oct 2018 14:15)  HR: 108 (13 Oct 2018 22:54) (93 - 108)  BP: 98/64 (13 Oct 2018 22:54) (98/64 - 108/72)  BP(mean): --  RR: 18 (13 Oct 2018 22:54) (17 - 18)  SpO2: 98% (13 Oct 2018 22:54) (91% - 98%)  CAPILLARY BLOOD GLUCOSE        I&O's Summary      PHYSICAL EXAM:  GENERAL: depressed mood, flat affect  HEAD:  Atraumatic, b/l facial droop  CHEST/LUNG: Clear to auscultation bilaterally; No wheeze  HEART: Regular rate and rhythm; No murmurs, rubs, or gallops  ABDOMEN: Soft, Nondistended; nontender, Bowel sounds present  EXTREMITIES:  2+ Peripheral Pulses. LLE wound wrapped, dressing c/d/i, b/l 2+ pitting edema  PSYCH: AAOx3  NEUROLOGY: non-focal  SKIN: No rashes or lesions    LABS:                        8.7    15.3  )-----------( 236      ( 13 Oct 2018 16:53 )             25.0     10-13    135  |  104  |  102<H>  ----------------------------<  198<H>  3.8   |  12<L>  |  2.79<H>    Ca    8.1<L>      13 Oct 2018 16:53  Phos  6.8     10-12  Mg     2.6     10-12    TPro  5.1<L>  /  Alb  2.1<L>  /  TBili  0.4  /  DBili  x   /  AST  13  /  ALT  16  /  AlkPhos  87  10-12    PT/INR - ( 12 Oct 2018 17:51 )   PT: 13.4 sec;   INR: 1.24 ratio         PTT - ( 12 Oct 2018 17:51 )  PTT:42.4 sec          RADIOLOGY & ADDITIONAL TESTS:    Imaging Personally Reviewed:    Consultant(s) Notes Reviewed:      Care Discussed with Consultants/Other Providers: Dr. Augusto Jackson  Internal Medicine PGY-1   Pager# [phone]    Patient is a 68y old  Female who presents with a chief complaint of Leg weakness (13 Oct 2018 21:25)      SUBJECTIVE / OVERNIGHT EVENTS: No acute overnight events. She was tachycardiac to 108, BP- 98/64, but afebrile. Reports leg pain with positional changes, and RUQ/LUQ discomfort with cough. Denies SOB, fever, h/d/n. She one episode of vomiting. Had productive cough.     MEDICATIONS  (STANDING):  hydrocortisone sodium succinate Injectable 50 milliGRAM(s) IV Push every 8 hours  influenza   Vaccine 0.5 milliLiter(s) IntraMuscular once  piperacillin/tazobactam IVPB. 3.375 Gram(s) IV Intermittent every 8 hours  sodium chloride 0.9%. 1000 milliLiter(s) (100 mL/Hr) IV Continuous <Continuous>    MEDICATIONS  (PRN):  HYDROmorphone  Injectable 1 milliGRAM(s) IV Push every 4 hours PRN Severe Pain (7 - 10)  ondansetron Injectable 4 milliGRAM(s) IV Push every 6 hours PRN Nausea and/or Vomiting      Vital Signs Last 24 Hrs  T(C): 36.3 (13 Oct 2018 22:54), Max: 36.4 (13 Oct 2018 14:15)  T(F): 97.3 (13 Oct 2018 22:54), Max: 97.5 (13 Oct 2018 14:15)  HR: 108 (13 Oct 2018 22:54) (93 - 108)  BP: 98/64 (13 Oct 2018 22:54) (98/64 - 108/72)  BP(mean): --  RR: 18 (13 Oct 2018 22:54) (17 - 18)  SpO2: 98% (13 Oct 2018 22:54) (91% - 98%)  CAPILLARY BLOOD GLUCOSE        I&O's Summary      PHYSICAL EXAM:  GENERAL: depressed mood, flat affect  HEAD:  Atraumatic, b/l facial droop  CHEST/LUNG: Clear to auscultation bilaterally; No wheeze  HEART: Regular rate and rhythm; No murmurs, rubs, or gallops  ABDOMEN: Soft, Nondistended; nontender, Bowel sounds present  EXTREMITIES:  2+ Peripheral Pulses. LLE wound wrapped, dressing c/d/i, b/l 2+ pitting edema  PSYCH: AAOx3  NEUROLOGY: non-focal  SKIN: No rashes or lesions    LABS:                        8.7    15.3  )-----------( 236      ( 13 Oct 2018 16:53 )             25.0     10-13    135  |  104  |  102<H>  ----------------------------<  198<H>  3.8   |  12<L>  |  2.79<H>    Ca    8.1<L>      13 Oct 2018 16:53  Phos  6.8     10-12  Mg     2.6     10-12    TPro  5.1<L>  /  Alb  2.1<L>  /  TBili  0.4  /  DBili  x   /  AST  13  /  ALT  16  /  AlkPhos  87  10-12    PT/INR - ( 12 Oct 2018 17:51 )   PT: 13.4 sec;   INR: 1.24 ratio         PTT - ( 12 Oct 2018 17:51 )  PTT:42.4 sec          RADIOLOGY & ADDITIONAL TESTS:    Imaging Personally Reviewed:    Consultant(s) Notes Reviewed:  podiatry    Care Discussed with Consultants/Other Providers:

## 2018-10-14 NOTE — PROGRESS NOTE ADULT - PROBLEM SELECTOR PLAN 2
10/12 found to be bleeding profusely from LLE from punchy biopsy site, hypotensive, desatting. RRT called, difficult venous access, FFP given, hemostasis achieved. Pt deferred further intervention preferring comfort measures. Now stable on floors with sBP in 90s  - Hold all anticoagulation, monitor coags  - Monitor cbc, will plan to transfuse for hgb < 7  -yesterday pt refused transfusion, however concern that pts depression is inhibiting her from making rationale medical decisions. Plan for capacity assessment today by psychiatry 10/12 cxr showed pneumoperitoneum -2/2 bleeding profusely from LLE from punchy biopsy site on 10/12. hemostasis achieved and pt stabilized after RRT  - Pt deferred further intervention preferring comfort measures.

## 2018-10-14 NOTE — PROGRESS NOTE ADULT - PROBLEM SELECTOR PLAN 7
Pt endorses years of dysphagia but states she does better with thin liquids, likely 2/2 underlying rheumatological disorder with esophageal involvement. Seen by s/s , patient endorses that she understands she is aspirating  and risk of aspiration pna on current diet but does not wish to deescalate diet.  - c/w current diet (pleasure feeds)  - Soft diet  -capacity assessment by psych Pt endorses years of dysphagia but states she does better with thin liquids, likely 2/2 underlying rheumatological disorder with esophageal involvement. Seen by s/s , patient endorses that she understands she is aspirating  and risk of aspiration pna on current diet but does not wish to deescalate diet.  - c/w current diet (pleasure feeds)  - Soft diet - c/w current diet (pleasure feeds)  - Soft diet

## 2018-10-14 NOTE — PROGRESS NOTE ADULT - PROBLEM SELECTOR PLAN 1
Pt with traumatic LLE ulcers and hematoma following fall, now admitted for severe sepsis 2/2 superimposing cellulitis and infected hematoma refractory to outpatient abx  - currently with swelling and erythema now improving  - CT LLE (10/9) showing likely infection limited to soft tissue, with no overt deep tissue infection  - wound ctx on admission grown variable GNR, awaiting biopsies sent 10/12  - Tc/w zosyn, vancomycin  - per ID can consider MRI as clinically indicated to r/o OM. Per podiatry they believe this infection superficial.  - hba1c wnl  - ID consult following  - s/p punch biopsy per podiatry (10/12)  -continue pain control, renally dosed Pt with traumatic LLE ulcers and hematoma following fall, now admitted for severe sepsis 2/2 superimposing cellulitis and infected hematoma refractory to outpatient abx  - currently with swelling and erythema now improving  - CT LLE (10/9) showing likely infection limited to soft tissue, with no overt deep tissue infection  - wound ctx on admission grown variable GNR, awaiting biopsies sent 10/12  - d/c vancomycin/zosyn  - s/p punch biopsy per podiatry (10/12)  - Comfort measure only  - Pain control w/ Dilaudid 2 mg Q4H PRN and 1 mg Q2H breakthrough pain 2/2 perforated sigmoid diverticulitis. pt with peritonitis, surgical abdomen. pt refusing surgery and is comfort measures only.   -d/c antibiotics and ivf  -tolerating diet  -pain control and anti-emetics only  - Pain control w/ Dilaudid 2 mg Q4H PRN and 1 mg Q2H breakthrough pain

## 2018-10-14 NOTE — PROGRESS NOTE ADULT - PROBLEM SELECTOR PLAN 5
Patient with worsening renal function in setting of volume loss and hypotension. LCr currently 2.79. suspect prerenal.  - continue to monitor  - c/w IVF fluid hydration, address underlying infection as below  -renally dose all meds  -bladder scan, renal sono if Cr worsens further   -renal c/s if Cr worsens further Patient with worsening renal function in setting of volume loss and hypotension. LCr currently 2.79. suspect prerenal.  - continue to monitor  - d/c IVF fluid hydration  -renally dose all meds  - Pt deferred further intervention preferring comfort measures.   -renal c/s if Cr worsens further likely secondary to sepsis. no longer trending

## 2018-10-14 NOTE — PROGRESS NOTE ADULT - ATTENDING COMMENTS
severe sepsis 2/2 cellulitis/leg wound, hospital course complicated by hemorrhagic shock following leg wound biopsy and perforated sigmoid diverticulitis with surgical abdomen detected on CT scan 10/13. As above pt refusing surgery 2/2 and was deemed to have capacity by psychiatry. pt with long standing polymyositis/autoimmune disease with extremely poor quality of life at home, inability of care for self. At this time her wish is for comfort care only. We discussed continuing antibiotics today and she asked us to stop. Palliatve care consult called for potential PCU transfer. Pt is on IV dilaudid and comfort measures only.

## 2018-10-14 NOTE — PROGRESS NOTE ADULT - PROBLEM SELECTOR PLAN 4
Initially with hypotension, tachycardia and leukocytosis and lactate 3.3 BP, meeting severe sepsis criteria on admission responsive to fluids and stress dose steroids, likely SSTI   - c/w IVF  - c/w abx as above  - blood btx 10/9 NGTD  - downtitrate hydrocortisone to 50 mg q8 x 3 doses  - trend lactate Initially with hypotension, tachycardia and leukocytosis and lactate 3.3 BP, meeting severe sepsis criteria on admission responsive to fluids and stress dose steroids, likely SSTI   - blood btx 10/9 NGTD  - down-titrate hydrocortisone to 50 mg Q12H present 2/2 cellulitis and perforated sigmoid diverticulitis

## 2018-10-14 NOTE — PROGRESS NOTE ADULT - PROBLEM SELECTOR PLAN 10
Extensive discussion had with patient. She is endorsing with her ongoing rheumatologic, poor quality of life, lack of familial support, inability to tolerate PO that she wishes to be DNR DNI. She endorses mild depression due to her disease process, deferring treatment but no suicidal or homicidal ideation. She is amenable to treating reversible causes. DNR DNI placed in chart Extensive discussion had with patient. She is endorsing with her ongoing rheumatologic, poor quality of life, lack of familial support, inability to tolerate PO that she wishes to be DNR DNI. She endorses mild depression due to her disease process, deferring treatment but no suicidal or homicidal ideation. Deemed to have capacity by psychiatry on 10/13.

## 2018-10-14 NOTE — PROGRESS NOTE ADULT - ASSESSMENT
Patient is a 68y old  Female Hx multiple falls, LLE ankle and leg fracture s/p ORIF w/ rods and screws, polymyositis on chronic solumedrol 40mg daily who presents w/ a LLE ulceration and worsening erythema on outpt abx found to be tachycardic w/ leukocytosis and hypotension responsive to fluids and MAI concerning for severe sepsis 2/2 cellulitis course c/b LLE hemorrhage s/p punch biopsy w/ associated hypotension concerning for hemorrhagic shock now stable though w/ soft BPs. Patient is a 68y old  Female Hx multiple falls, LLE ankle and leg fracture s/p ORIF w/ rods and screws, polymyositis on chronic solumedrol 40mg daily who presents w/ a LLE ulceration and worsening erythema on outpt abx found to be tachycardic w/ leukocytosis and hypotension responsive to fluids and MAI concerning for severe sepsis 2/2 cellulitis course c/b LLE hemorrhage s/p punch biopsy w/ associated hypotension concerning for hemorrhagic shock now stable though w/ soft BPs. Patient is DNR and is on comfort measures only. Patient is a 68y old  Female Hx multiple falls, LLE ankle and leg fracture s/p ORIF w/ rods and screws, polymyositis on chronic solumedrol 40mg daily who presents w/ a LLE ulceration and worsening erythema on outpt abx found to be tachycardic w/ leukocytosis and hypotension responsive to fluids and MAI concerning for severe sepsis 2/2 cellulitis course c/b LLE hemorrhage s/p punch biopsy w/ associated hypotension concerning for hemorrhagic shock now stable though w/ soft BPs. Patient is DNR and is on comfort measures only. Her abx and IVF were discontinue and she is only getting medications for pain and nausea control. Patient is a 68y old  Female Hx multiple falls, LLE ankle and leg fracture s/p ORIF w/ rods and screws, polymyositis on chronic solumedrol 40mg daily who presents w/ a LLE ulceration and worsening erythema on outpt abx found to be tachycardic w/ leukocytosis and hypotension responsive to fluids and MAI concerning for severe sepsis 2/2 cellulitis course c/b LLE hemorrhage s/p punch biopsy w/ associated hypotension concerning for hemorrhagic shock now stable though w/ soft BPs. pt found to have perforated sigmoid diverticulitis with surgical abdomen by CT. After much discussion and determination of capacity by psychiatry pt is deemed capable of refusing surgery. Patient is now DNR/DNI and is on comfort measures only. Her abx and IVF were discontinue and she is only getting medications for pain and nausea control.

## 2018-10-14 NOTE — PROGRESS NOTE ADULT - SUBJECTIVE AND OBJECTIVE BOX
Patient is a 68y old  Female who presents with a chief complaint of Leg weakness (14 Oct 2018 08:02)       INTERVAL HPI/OVERNIGHT EVENTS:  Patient seen and evaluated at bedside.  Pt is resting comfortable in NAD. Denies N/V/F/C.    Allergies    penicillins (Unknown)    Intolerances        Vital Signs Last 24 Hrs  T(C): 36.5 (14 Oct 2018 11:27), Max: 36.5 (14 Oct 2018 11:27)  T(F): 97.7 (14 Oct 2018 11:27), Max: 97.7 (14 Oct 2018 11:27)  HR: 98 (14 Oct 2018 11:27) (93 - 108)  BP: 116/77 (14 Oct 2018 11:27) (98/64 - 116/77)  BP(mean): --  RR: 18 (14 Oct 2018 11:27) (17 - 18)  SpO2: 99% (14 Oct 2018 11:27) (91% - 99%)    LABS:                        8.7    15.3  )-----------( 236      ( 13 Oct 2018 16:53 )             25.0     10-13    135  |  104  |  102<H>  ----------------------------<  198<H>  3.8   |  12<L>  |  2.79<H>    Ca    8.1<L>      13 Oct 2018 16:53  Phos  6.8     10-12  Mg     2.6     10-12    TPro  5.1<L>  /  Alb  2.1<L>  /  TBili  0.4  /  DBili  x   /  AST  13  /  ALT  16  /  AlkPhos  87  10-12    PT/INR - ( 12 Oct 2018 17:51 )   PT: 13.4 sec;   INR: 1.24 ratio         PTT - ( 12 Oct 2018 17:51 )  PTT:42.4 sec    CAPILLARY BLOOD GLUCOSE          Lower Extremity Physical Exam:  dressing clean dry and intact with no strikethrough

## 2018-10-14 NOTE — PROGRESS NOTE ADULT - ASSESSMENT
67 yo F w/ left leg hematoma w/ associated cellulitis  - Pt seen and examined  - Dressings clean dry and intact  - Cont IV abx  - awaiting results of punch biopsies taken of both wounds to r/o pyoderma  - no further intervention at this time  - D/w attending

## 2018-10-14 NOTE — PROGRESS NOTE ADULT - PROBLEM SELECTOR PLAN 8
-test BG AC/HS  -Decrease lantus 28 units QHS  -Increase Humalog 20 units AC meals  -c/w Humalog moderate correction scale AC and Mod HS scale  -discussed w/pt and RN  pager: [phone] Pt states uncertain of diagnosis, per PMD records PMH of polymyositis, dermatomyositis, CREST syndrome  - On prednisone 40mg daily since 3/2018- now tapering stress dose steroids in setting of sepsis.  - Rheum c/s on Monday 10/15, will send off rheum panel since no office visit for years  - Follows Dr. Renae Lopez ([phone]) will contact for collateral Pt states uncertain of diagnosis, per PMD records PMH of polymyositis, dermatomyositis, CREST syndrome  - On prednisone 40mg daily since 3/2018- now tapering stress dose steroids in setting of sepsis.  - Rheum panel pending Pt states uncertain of diagnosis, per PMD records PMH of polymyositis, dermatomyositis, CREST syndrome  - On prednisone 40mg daily since 3/2018- now tapering stress dose steroids started 2/2 sepsis  - Rheum panel pending; however pt now comfort care only. Her quality of life was declining over past several years and pt never received true diagnosis. pt was unable to care for self and now with life threatening infection/ declining treatment. peritoneal signs likely not detected on examination 2/2 chronic steroid use.

## 2018-10-15 DIAGNOSIS — R53.81 OTHER MALAISE: ICD-10-CM

## 2018-10-15 DIAGNOSIS — K57.32 DIVERTICULITIS OF LARGE INTESTINE WITHOUT PERFORATION OR ABSCESS WITHOUT BLEEDING: ICD-10-CM

## 2018-10-15 DIAGNOSIS — Z51.5 ENCOUNTER FOR PALLIATIVE CARE: ICD-10-CM

## 2018-10-15 PROCEDURE — 99223 1ST HOSP IP/OBS HIGH 75: CPT

## 2018-10-15 PROCEDURE — 99232 SBSQ HOSP IP/OBS MODERATE 35: CPT | Mod: GC

## 2018-10-15 RX ORDER — HYDROMORPHONE HYDROCHLORIDE 2 MG/ML
2 INJECTION INTRAMUSCULAR; INTRAVENOUS; SUBCUTANEOUS
Qty: 0 | Refills: 0 | Status: DISCONTINUED | OUTPATIENT
Start: 2018-10-15 | End: 2018-10-17

## 2018-10-15 RX ADMIN — HYDROMORPHONE HYDROCHLORIDE 2 MILLIGRAM(S): 2 INJECTION INTRAMUSCULAR; INTRAVENOUS; SUBCUTANEOUS at 05:25

## 2018-10-15 RX ADMIN — HYDROMORPHONE HYDROCHLORIDE 2 MILLIGRAM(S): 2 INJECTION INTRAMUSCULAR; INTRAVENOUS; SUBCUTANEOUS at 06:00

## 2018-10-15 RX ADMIN — ONDANSETRON 4 MILLIGRAM(S): 8 TABLET, FILM COATED ORAL at 13:09

## 2018-10-15 RX ADMIN — Medication 50 MILLIGRAM(S): at 17:42

## 2018-10-15 RX ADMIN — Medication 50 MILLIGRAM(S): at 05:25

## 2018-10-15 NOTE — DIETITIAN INITIAL EVALUATION ADULT. - PROBLEM SELECTOR PLAN 3
- Hematoma in the setting of recent fall due to polymyositis, now complicated by cellulitis in the LLE  - Continue with empiric antibiotics for cellulitis while pending culture  - s/p debridement of wound by Podiatry  - Continue to monitor

## 2018-10-15 NOTE — PROGRESS NOTE ADULT - PROBLEM SELECTOR PLAN 6
multifactorial 2/2 sepsis/infection , hypotension and volume loss with lactic acidosis and renal dysfxn - c/w current diet (pleasure feeds)  - Soft diet - c/w current diet (pleasure feeds)  - Dysphagia 3 Soft diet with thin liquids

## 2018-10-15 NOTE — PROGRESS NOTE ADULT - PROBLEM SELECTOR PLAN 8
Pt states uncertain of diagnosis, per PMD records PMH of polymyositis, dermatomyositis, CREST syndrome  - On prednisone 40mg daily since 3/2018- now tapering stress dose steroids started 2/2 sepsis  - Rheum panel pending; however pt now comfort care only. Her quality of life was declining over past several years and pt never received true diagnosis. pt was unable to care for self and now with life threatening infection/ declining treatment. peritoneal signs likely not detected on examination 2/2 chronic steroid use. discontinued in setting of comfort care

## 2018-10-15 NOTE — PROGRESS NOTE ADULT - PROBLEM SELECTOR PLAN 9
discontinued in setting of comfort care Extensive discussion had with patient. She is endorsing with her ongoing rheumatologic, poor quality of life, lack of familial support, inability to tolerate PO that she wishes to be DNR DNI. She endorses mild depression due to her disease process, deferring treatment but no suicidal or homicidal ideation. Deemed to have capacity by psychiatry on 10/13.  - Transferred to palliative care unit 10/15 Extensive discussion had with patient. She is endorsing with her ongoing rheumatologic, poor quality of life, lack of familial support, inability to tolerate PO that she wishes to be DNR/DNI. She endorses mild depression due to her disease process, deferring treatment but no suicidal or homicidal ideation. Deemed to have capacity by psychiatry on 10/13.  - Transferred to palliative care unit 10/15

## 2018-10-15 NOTE — DIETITIAN INITIAL EVALUATION ADULT. - ADHERENCE
no dietary restrictions PTA. Had difficulty swallowing PTA but did not modify diet consistency. Confirms NKFA. No vitamin supplementation PTA./n/a

## 2018-10-15 NOTE — DIETITIAN INITIAL EVALUATION ADULT. - PROBLEM SELECTOR PLAN 8
- On chronic Prednisone 40 mg daily, dose unchanged in 03/2018, holding PO Prednisone  - Continue with stress dose steroid Hydrocortisone 100 mg Q8H

## 2018-10-15 NOTE — PROGRESS NOTE ADULT - PROBLEM SELECTOR PLAN 4
present 2/2 cellulitis and perforated sigmoid diverticulitis Overall worsening renal in setting of blood loss anemia, underlying infection 2/2 cellulitis and perforate diverticulitis. Deferring further monitoring Overall worsening renal failure in setting of blood loss anemia, underlying infection 2/2 cellulitis and perforated diverticulitis. Deferring further monitoring in view of comfort measures only.

## 2018-10-15 NOTE — DIETITIAN INITIAL EVALUATION ADULT. - PROBLEM SELECTOR PLAN 5
- Anion gap metabolic acidosis with AG 27 likely secondary to combination of lactate elevation as well as uremia  - Continue with IVF for hydration, monitor BMP daily

## 2018-10-15 NOTE — PROGRESS NOTE ADULT - PROBLEM SELECTOR PLAN 3
Pt with traumatic LLE ulcers and hematoma following fall, now admitted for severe sepsis 2/2 superimposing cellulitis and infected hematoma refractory to outpatient abx  - CT LLE (10/9) showing likely infection limited to soft tissue, with no overt deep tissue infection  - wound ctx on admission grown variable GNR, awaiting biopsies sent 10/12  - d/c vancomycin/zosyn per pt wishes  - Comfort measure only  - Pain control w/ Dilaudid 2 mg Q4H PRN and 1 mg Q2H breakthrough pain Pt with traumatic LLE ulcers and hematoma following fall, now admitted for severe sepsis 2/2 superimposing cellulitis and infected hematoma refractory to outpatient abx  - CT LLE (10/9) showing likely infection limited to soft tissue, with no overt deep tissue infection  - wound cx on admission grown variable GNR, awaiting biopsies sent 10/12  - d/c vancomycin/zosyn per pt wishes  - Comfort measures only  - Pain control w/ Dilaudid IV 2 mg Q4H PRN and 1 mg Q2H breakthrough pain

## 2018-10-15 NOTE — DIETITIAN INITIAL EVALUATION ADULT. - PROBLEM SELECTOR PLAN 1
-LLE cellulitis  - s/p 1x Vanc and Clindamycin the ED, continue with Vancomycin for MRSA (by level), adding Cefepime for other gram positive and gram negative/anaerobe coverage while pending blood cultures

## 2018-10-15 NOTE — PROGRESS NOTE ADULT - ATTENDING COMMENTS
-Transferred patient to PCU today. -Patient seen and examined on 10/15/18. Case/plan discussed with medical student as reviewed/edited by me above.   -Transferred patient to PCU today. -Patient seen and examined on 10/15/18. Case/plan discussed with medical student/resident as reviewed/edited by me above.   -Transferred patient to PCU today.

## 2018-10-15 NOTE — CONSULT NOTE ADULT - SUBJECTIVE AND OBJECTIVE BOX
Patient is a 68y old  Female who presents with a chief complaint of Leg weakness (11 Oct 2018 11:36)      HPI:  Patient is a 68 year old woman with history of polymyositis on Prednisone 40 mg since 2018 daily and chronic left leg ulcer presented with left leg weakness and LLE ulcer. Patient has been getting progressively weaker for the past 2 weeks and sustained a fall 2 weeks ago and LE ulcer has been getting progressively worse with drainage of serosanguinous fluid from blisters. She recently went to [facility] after the fall and were discharged on PO antibiotics (which the patient did not remember the name). She noticed increasing drainage today and worsening pain, thus presented to the ED today. Patient previously had debridement with skin graft application in  after sustaining an ankle fracture of the LLE.      Patient denies fever, but does endorse chills. She endorses decreased appetite. No chest pain, no shortness of breath. She denies sick contact. She endorses difficulty swallowing secondary to polymyositis and has been drinking and eating in small portions. She denies nausea or vomiting or abdominal pain. She denies urinary symptoms. She has difficulty with ambulation for the past 2 weeks and has trouble sitting herself up.     In the ED, initial vitals were Temp 36.4, , RR 16, /87. Patient's blood pressure dropped to 85/67 and she received total of 2 L NS bolus. She also received 1x Vancomycin and Clindamycin. (09 Oct 2018 21:55)      PAST MEDICAL & SURGICAL HISTORY:  Polymyositis  History of Tonsillectomy  History of skin graft  Ankle fracture      Allergies  penicillins (Unknown)        ANTIMICROBIALS:  piperacillin/tazobactam IVPB. 3.375 every 8 hours      OTHER MEDS: MEDICATIONS  (STANDING):  acetaminophen   Tablet .. 975 every 6 hours PRN  hydrocortisone sodium succinate Injectable 50 every 8 hours  influenza   Vaccine 0.5 once  oxyCODONE    IR 5 every 8 hours PRN  pantoprazole    Tablet 40 before breakfast  traMADol 25 every 8 hours PRN      SOCIAL HISTORY:       FAMILY HISTORY:  Family history of CVA  Family history of diabetes mellitus (Sibling)      REVIEW OF SYSTEMS  [ x ] All other systems negative except as noted below:	    Constitutional:  [ ] fever [ ] chills  [ ] weight loss  [ ] weakness  Skin:  [ ] rash [ ] phlebitis	  Eyes: [ ] icterus [ ] pain  [ ] discharge	  ENMT: [ ] sore throat  [ ] thrush [ ] ulcers [ ] exudates  Respiratory: [ ] dyspnea [ ] hemoptysis [ ] cough [ ] sputum	  Cardiovascular:  [ ] chest pain [ ] palpitations [ ] edema	  Gastrointestinal:  [ ] nausea [ ] vomiting [ ] diarrhea [ ] constipation [ ] pain	  Genitourinary:  [ ] dysuria [ ] frequency [ ] hematuria [ ] discharge [ ] flank pain  [ ] incontinence  Musculoskeletal:  [ ] myalgias [ ] arthralgias [ ] arthritis  [ x] foot pain  Neurological:  [ ] headache [ ] seizures  [ ] confusion/altered mental status  Psychiatric:  [ ] anxiety [ ] depression	  Hematology/Lymphatics:  [ ] lymphadenopathy  Endocrine:  [ ] adrenal [ ] thyroid  Allergic/Immunologic:	 [ ] transplant [ ] seasonal    Vital Signs Last 24 Hrs  T(F): 96.8 (10-11-18 @ 14:22), Max: 98.5 (10-09-18 @ 23:31)    Vital Signs Last 24 Hrs  HR: 86 (10-11-18 @ 14:22) (80 - 86)  BP: 106/74 (10-11-18 @ 14:22) (106/74 - 118/79)  RR: 18 (10-11-18 @ 14:22)  SpO2: 97% (10-11-18 @ 14:22) (97% - 99%)  Wt(kg): --    PHYSICAL EXAM:  General: cushingoid facies, round body habitus   HEAD/EYES: anicteric, PERRL  ENT:  supple  Cardiovascular:   S1, S2  Respiratory:  clear bilaterally  GI:  soft, non-tender, normal bowel sounds  :  no CVA tenderness   Musculoskeletal:  left lower ext swelling/edema, erythema, warmth, tenderness with 2 ulcers with serosanguinous discharge and fould smelling discharge    Neurologic:  grossly non-focal  Skin:  no rash  Lymph: no lymphadenopathy  Psychiatric:  appropriate affect  Vascular:  no phlebitis                            13.6   14.8  )-----------( 191      ( 11 Oct 2018 13:34 )             39.3       10-11    133<L>  |  95<L>  |  106<H>  ----------------------------<  135<H>  3.7   |  18<L>  |  1.90<H>    Ca    8.4      11 Oct 2018 13:34  Phos  7.4     10-10  Mg     2.6     10-10    TPro  6.2  /  Alb  2.7<L>  /  TBili  0.5  /  DBili  x   /  AST  12  /  ALT  15  /  AlkPhos  78  10-11      Urinalysis Basic - ( 09 Oct 2018 22:08 )    Color: Yellow / Appearance: Slightly Turbid / S.019 / pH: x  Gluc: x / Ketone: Negative  / Bili: Negative / Urobili: Negative   Blood: x / Protein: 30 mg/dL / Nitrite: Negative   Leuk Esterase: Moderate / RBC: 49 /hpf / WBC 9 /hpf   Sq Epi: x / Non Sq Epi: 8 /hpf / Bacteria: Negative        MICROBIOLOGY:    Culture - Surgical Swab (collected 10-09-18 @ 21:18)  Source: .Surgical Swab Synovial Fluid  Preliminary Report (10-10-18 @ 16:29):    No growth to date.    Culture - Abscess with Gram Stain (collected 10-09-18 @ 21:13)  Source: .Abscess left leg hematoma  Preliminary Report (10-10-18 @ 16:38):    Numerous Mixed gram negative rods "Susceptibilities not performed"    Culture - Blood (collected 10-09-18 @ 17:48)  Source: .Blood Blood-Venous  Preliminary Report (10-10-18 @ 18:02):    No growth to date.    Culture - Blood (collected 10-09-18 @ 17:48)  Source: .Blood Blood  Preliminary Report (10-10-18 @ 18:02):    No growth to date.        Vancomycin Level, Trough: 21.6 ug/mL (10-11-18 @ 13:34)      RADIOLOGY:    < from: Xray Tibia + Fibula 2 Views, Left (10.09.18 @ 14:45) >  IMPRESSION:   There is a large rounded soft tissue lesion along the posterior aspect of   the calf at the level of the distal tibia measuring 7.2 cm in the   craniocaudal dimension. There is no soft tissue gas. There is no adjacent   cortical erosions or periosteal reaction. There is diffuse swelling of   the lower extremity and foot.  No acute fracture is visualized. There are old fractures of the distal   tibia and fibula with intramedullary simone and fixation plate and screws.   There is no evidence of hardware complication. There is an old fracture   of the fifth metatarsal shaft.    < end of copied text >    < from: CT Lower Extremity No Cont, Left (10.09.18 @ 19:43) >  IMPRESSION:    Moderate circumferential soft tissue swelling with skin thickening,   nonspecific but can be seen with cellulitis. No drainable fluid   collection.  No CT evidence for osteomyelitis.  Severe calf and distal thigh muscle atrophy.    < end of copied text >    imaging below personally reviewed
68 year old single female, domiciled, no pphx, with history of polymyositis on Prednisone 40 mg since 03/2018 daily and chronic left leg ulcer presented with left leg weakness and LLE ulcer. Pt admitted with sepsis. Being treated with abx for LLE wound. Has had 1 rapid response called for hypotension. VS are wnl currently. During rapid response, pt had CXR concerning for pneumoperitoneum. Abd exam was normal. Pt was ordered for CT abdomen but initially refused. Original psych consult was for capacity to refuse CT. Pt was deemed to NOT have capacity at that time because "pt doesn't understand risks/benefits, doesn't appreciate the severity of her condition" and "pt wasn't aware of the risks/benefits of ct abd, or the need for the scan". Pt did, however, agree to get the scan. CT scan showed pneumoperitoneum likely consistent with perforated diverticulitis. Surgery was consulted and recommended emergent surgery, but pt refused. Psych consulted again for capacity to refuse surgery. During interview, pt was able to explain correctly that she had a "hole in her bowel that is leaking air and feces" and that "they cut me open and turn it into a bag". Pt was able to articulate understanding that this complication will result in infection, pain, suffering, and high likelihood of death. Pt was able to articulate that the alternative of treatment with abx would not be effective and would still result in death. Pt explains that she prefers death and the pain/suffering that will precede it, should she not have the surgery, because of her debilitating pain 2/2 polymyositis. Pt says that she is tired of her quality of life and to worsen it with surgery is not something she wants. She says she understands the doctors' recommendation and that other people may be sad upon her death, but still refuses saying, "they are not me, I'm suffering". Pt understands the medical condition requiring treatment, the proposed treatment along with it's benefits/risks, it's alternative's benefits/risks including death, and the consequences of refusing treatment. Pt previously endorsed passive SI with no active SI/I/P. Reports some depressed mood in context of her medical conditions. Pt denies psychotic or manic sx. She is alert and oriented x 4. Pt does have the capacity to refuse the proposed treatment at this time.
ACUTE CARE SURGERY ([facility] - #[id number]) CONSULT NOTE  ---------------------------------------------------------------------------------------------     HPI (as per HPI):  Patient is a 68 year old woman with history of polymyositis on Prednisone 40 mg since 03/2018 daily and chronic left leg ulcer presented with left leg weakness and LLE ulcer. Patient has been getting progressively weaker for the past 2 weeks and sustained a fall 2 weeks ago and LE ulcer has been getting progressively worse with drainage of serosanguinous fluid from blisters. She recently went to [facility] after the fall and were discharged on PO antibiotics (which the patient did not remember the name). She noticed increasing drainage today and worsening pain, thus presented to the ED today. Patient previously had debridement with skin graft application in 2015 after sustaining an ankle fracture of the LLE.      Patient denies fever, but does endorse chills. She endorses decreased appetite. No chest pain, no shortness of breath. She denies sick contact. She endorses difficulty swallowing secondary to polymyositis and has been drinking and eating in small portions. She denies nausea or vomiting or abdominal pain. She denies urinary symptoms. She has difficulty with ambulation for the past 2 weeks and has trouble sitting herself up.     In the ED, initial vitals were Temp 36.4, , RR 16, /87. Patient's blood pressure dropped to 85/67 and she received total of 2 L NS bolus. She also received 1x Vancomycin and Clindamycin. (09 Oct 2018 21:55)        Since admission, she has been treated with antibiotics for the LLE wound and has had a biopsy done by podiatry. She had bleeding from the procedure which led to a rapid response for hypotension.  During the workup during the RRT, she had a CXR which was concerning for pneumoperitoneum.  She reportedly had a benign abdominal exam at the time, and became hemodynamically stable.  She refused further workup including and abdominal CT scan at that time.     Today, patient was evaluated by psychiatry and deemed to not have capacity for refusing the CT scan.  She reluctantly had the CT scan today, which showed pneumoperitoneum likely consistent with perforated diverticulitis.  Surgery consulted for urgent evaluation.      ROS: 10-system review is otherwise negative except HPI above.      PAST MEDICAL & SURGICAL HISTORY:  Polymyositis  History of Tonsillectomy  History of skin graft  Ankle fracture    FAMILY HISTORY:  Family history of CVA  Family history of diabetes mellitus (Sibling)    SOCIAL HISTORY: Denies any close family members, has a distant nephew that knows she is in the hospital but does not know events since admission.  Has a boyfriend who has been with her at bedside regularly (patient does not allow for us to discuss current condition with patient).      ALLERGIES: penicillins (Unknown)    CURRENT MEDICATIONS  MEDICATIONS (STANDING): hydrocortisone sodium succinate Injectable 50 milliGRAM(s) IV Push every 8 hours  influenza   Vaccine 0.5 milliLiter(s) IntraMuscular once  pantoprazole    Tablet 40 milliGRAM(s) Oral before breakfast  piperacillin/tazobactam IVPB. 3.375 Gram(s) IV Intermittent every 8 hours  sodium chloride 0.9% Bolus 1000 milliLiter(s) IV Bolus once  sodium chloride 0.9%. 1000 milliLiter(s) IV Continuous <Continuous>    MEDICATIONS (PRN):acetaminophen   Tablet .. 975 milliGRAM(s) Oral every 6 hours PRN Mild Pain (1 - 3)  HYDROmorphone  Injectable 1 milliGRAM(s) IV Push every 4 hours PRN Severe Pain (7 - 10)  ondansetron Injectable 4 milliGRAM(s) IV Push every 6 hours PRN Nausea and/or Vomiting  oxyCODONE    IR 5 milliGRAM(s) Oral every 6 hours PRN Moderate Pain (4 - 6) and Severe pain (7-10)    --------------------------------------------------------------------------------------------    Vitals:   T(C): 36.4 (10-13-18 @ 14:15), Max: 36.4 (10-13-18 @ 14:15)  HR: 93 (10-13-18 @ 14:15) (89 - 93)  BP: 108/72 (10-13-18 @ 14:15) (94/65 - 108/72)  RR: 17 (10-13-18 @ 14:15) (16 - 17)  SpO2: 91% (10-13-18 @ 14:15) (91% - 93%)    10-12 @ 07:01  -  10-13 @ 07:00  --------------------------------------------------------  IN:    IV PiggyBack: 100 mL    Plasma: 250 mL    sodium chloride 0.9%: 1200 mL  Total IN: 1550 mL  OUT:    Stool: 1 mL    Voided: 500 mL  Total OUT: 501 mL  Total NET: 1049 mL    Height (cm): 162.56 (10-10 @ 01:59)  Weight (kg): 91.3 (10-10 @ 01:59)  BMI (kg/m2): 34.5 (10-10 @ 01:59)  BSA (m2): 1.96 (10-10 @ 01:59)    PHYSICAL EXAM:   General: NAD, Sitting in bed, appears depressed  Cardio: RRR  Resp: Good effort b/l  GI/Abd: Soft, distended, tender in lower quadrants with focal guarding, no rebound, no incisions  Vascular: All 4 extremities warm.  Musculoskeletal: All 4 extremities moving spontaneously, dressing over LLE  --------------------------------------------------------------------------------------------    LABS  CBC (10-13 @ 16:53)                              8.7<L>                         15.3<H>  )----------------(  236        --    % Neutrophils, --    % Lymphocytes, ANC: --                                  25.0<L>  CBC (10-12 @ 17:51)                              11.6                           13.6<H>  )----------------(  275        --    % Neutrophils, --    % Lymphocytes, ANC: --                                  33.2<L>    BMP (10-13 @ 16:53)             135     |  104     |  102<H>		Ca++ --      Ca 8.1<L>             ---------------------------------( 198<H>		Mg --                 3.8     |  12<L>   |  2.79<H>			Ph --      BMP (10-12 @ 18:01)             131<L>  |  100     |  100<H>		Ca++ --      Ca 8.1<L>             ---------------------------------( 294<H>		Mg 2.6                4.0     |  10<LL>  |  2.00<H>			Ph 6.8<H>    LFTs (10-12 @ 18:01)      TPro 5.1<L> / Alb 2.1<L> / TBili 0.4 / DBili -- / AST 13 / ALT 16 / AlkPhos 87    Coags (10-12 @ 17:51)  aPTT 42.4<H> / INR 1.24<H> / PT 13.4<H>      ABG (10-12 @ 17:38)     7.34<L> / 20<L> / 140<H> / 10<L> / -13.7<L> / 99<H>%     Lactate:          --------------------------------------------------------------------------------------------    MICROBIOLOGY    -> .Surgical Swab Synovial Fluid Culture (10-09 @ 21:18)     NG    NG    No growth to date.    -> .Abscess left leg hematoma Culture (10-09 @ 21:13)     NG    NG    Numerous Mixed gram negative rods "Susceptibilities not performed"    -> .Blood Blood Culture (10-09 @ 17:48)     NG    NG    No growth to date.    --------------------------------------------------------------------------------------------    IMAGING  < from: CT Chest No Cont (10.13.18 @ 17:34) >    INTERPRETATION:    groundglass opacities in the lungs, L>R, which may be of infectious   etiology  b/l lower lobe atelectasis  pneumoperitoneum likely secondary to perforated diverticulitis  < end of copied text >      --------------------------------------------------------------------------------------------
HPI:  Patient is a 68 year old woman with history of polymyositis on Prednisone 40 mg since 03/2018 daily and chronic left leg ulcer presented with left leg weakness and LLE ulcer. Patient has been getting progressively weaker for the past 2 weeks and sustained a fall 2 weeks ago and LE ulcer has been getting progressively worse with drainage of serosanguinous fluid from blisters. She recently went to [facility] after the fall and were discharged on PO antibiotics (which the patient did not remember the name). She noticed increasing drainage today and worsening pain, thus presented to the ED today. Patient previously had debridement with skin graft application in 2015 after sustaining an ankle fracture of the LLE.      Patient denies fever, but does endorse chills. She endorses decreased appetite. No chest pain, no shortness of breath. She denies sick contact. She endorses difficulty swallowing secondary to polymyositis and has been drinking and eating in small portions. She denies nausea or vomiting or abdominal pain. She denies urinary symptoms. She has difficulty with ambulation for the past 2 weeks and has trouble sitting herself up.     In the ED, initial vitals were Temp 36.4, , RR 16, /87. Patient's blood pressure dropped to 85/67 and she received total of 2 L NS bolus. She also received 1x Vancomycin and Clindamycin. (09 Oct 2018 21:55)    PERTINENT PM/SXH:   Polymyositis  History of Tonsillectomy    History of skin graft  Ankle fracture  No significant past surgical history    FAMILY HISTORY:  Family history of CVA  Family history of diabetes mellitus (Sibling)    ITEMS NOT CHECKED ARE NOT PRESENT    SOCIAL HISTORY:   Significant other/partner:  [none  ]  Children:  [ ]  Evangelical/Spirituality:  Substance hx:  [ ]   Tobacco hx:  [ ]   Alcohol hx: [ ]   Home Opioid hx:  [ ] I-Stop Reference No:  Living Situation: [ x]Home  [ ]Long term care  [ ]Rehab [ ]Other    ADVANCE DIRECTIVES:    DNR  Yes  MOLST  [ ]  Living Will  [ ]   DECISION MAKER(s):  [x ] Health Care Proxy(s)  [ ] Surrogate(s)  [ ] Guardian           Name(s): Phone Number(s):  Nathan (Partner)  [phone]  BASELINE (I)ADL(s) (prior to admission):  [place]: [ ]Total  [ x] Moderate [ ]Dependent    Allergies    penicillins (Unknown)    Intolerances    MEDICATIONS  (STANDING):  hydrocortisone sodium succinate Injectable 50 milliGRAM(s) IV Push every 12 hours  influenza   Vaccine 0.5 milliLiter(s) IntraMuscular once    MEDICATIONS  (PRN):  HYDROmorphone  Injectable 2 milliGRAM(s) IV Push every 4 hours PRN Severe Pain (7 - 10)  HYDROmorphone  Injectable 1 milliGRAM(s) IV Push every 2 hours PRN Breakthrough pain  ondansetron Injectable 4 milliGRAM(s) IV Push every 6 hours PRN Nausea and/or Vomiting    PRESENT SYMPTOMS: [ ]Unable to obtain due to poor mentation   Source if other than patient:  [ ]Family   [ ]Team     Pain (Impact on QOL):    Location -       left le   Minimal acceptable level (0-10 scale):                    Aggrevating factors -  dressing changes movement  Quality - dull achy , sharp at times   Radiation -  up and down leg  Severity (0-10 scale) -  at worst 8  Timing -    PAIN AD Score:     [email or web address] (press ctrl +  left click to view)    Dyspnea:                           [ ]Mild [ ]Moderate [ ]Severe  Anxiety:                             [ ]Mild [x ]Moderate [ ]Severe  Fatigue:                             [ ]Mild [x ]Moderate [ ]Severe  Nausea:                             [ ]Mild [ ]Moderate [ ]Severe  Loss of appetite:              [ ]Mild [ ]Moderate [ ]Severe  Constipation:                    [ ]Mild [ ]Moderate [ ]Severe    Other Symptoms:  [x ]All other review of systems negative     Karnofsky Performance Score/Palliative Performance Status Version 2:    40    %  PHYSICAL EXAM:  Vital Signs Last 24 Hrs  T(C): 36.4 (15 Oct 2018 11:33), Max: 36.8 (15 Oct 2018 00:32)  T(F): 97.5 (15 Oct 2018 11:33), Max: 98.3 (15 Oct 2018 00:32)  HR: 95 (15 Oct 2018 11:33) (60 - 95)  BP: 119/85 (15 Oct 2018 11:33) (119/85 - 125/67)  BP(mean): --  RR: 18 (15 Oct 2018 11:33) (18 - 18)  SpO2: 100% (15 Oct 2018 11:33) (99% - 100%) I&O's Summary    14 Oct 2018 07:01  -  15 Oct 2018 07:00  --------------------------------------------------------  IN: 720 mL / OUT: 700 mL / NET: 20 mL    15 Oct 2018 07:01  -  15 Oct 2018 11:38  --------------------------------------------------------  IN: 240 mL / OUT: 0 mL / NET: 240 mL    GENERAL:  [x ]Alert  [x ]Oriented x   3[ ]Lethargic  [ ]Cachexia  [ ]Unarousable  [x ]Verbal  [ ]Non-Verbal  Behavioral:   [ ] Anxiety  [ ] Delirium [ ] Agitation [ ] Other  HEENT:  [ ]Normal   [ x]Dry mouth   [ ]ET Tube/Trach  [ ]Oral lesions  PULMONARY:   [x ]Clear [ ]Tachypnea  [ ]Audible excessive secretions   [ ]Rhonchi        [ ]Right [ ]Left [ ]Bilateral  [ ]Crackles        [ ]Right [ ]Left [ ]Bilateral  [ ]Wheezing     [ ]Right [ ]Left [ ]Bilateral  CARDIOVASCULAR:    [ ]Regular [x ]Irregular [ ]Tachy  [ ]Raza [ ]Murmur [ ]Other  GASTROINTESTINAL:  [x ]Soft/ pendulous  [ ]Distended   [ ]+BS  [ ]Non tender [ x]Tender deep palpation  [ ]PEG [ ]OGT/ NGT  Last BM:   10-10-18 @ 07:01  -  10-11-18 @ 07:00  --------------------------------------------------------  OUT: 1 mL    10-11-18 @ 07:01  -  10-12-18 @ 07:00  --------------------------------------------------------  OUT: 1 mL    10-12-18 @ 07:01  -  10-13-18 @ 07:00  --------------------------------------------------------  OUT: 1 mL    10-13-18 @ 07:01  -  10-14-18 @ 07:00  --------------------------------------------------------  OUT: 1 mL    10-14-18 @ 07:01  -  10-15-18 @ 07:00  --------------------------------------------------------  OUT: 0 mL    GENITOURINARY:  [ ]Normal [ x] Incontinent   [ ]Oliguria/Anuria   [ ]Posey  MUSCULOSKELETAL:   [ ]Normal   [ xx]Weakness  [ ]Bed/Wheelchair bound [ ]Edema  NEUROLOGIC:   [x ]No focal deficits  [ ] Cognitive impairment  [ ] Dysphagia [ ]Dysarthria [ ] Paresis [ ]Other   SKIN:   [ ]Normal   [ ]Pressure ulcer(s)  [ ]Rash  left lower extremity chronic  ulcer     CRITICAL CARE:  [ ] Shock Present  [ ]Septic [ ]Cardiogenic [ ]Neurologic [x ]Hypovolemic  [ ]  Vasopressors [ ]  Inotropes   [ ] Respiratory failure present  [ ] Acute  [ ] Chronic [ ] Hypoxic  [ ] Hypercarbic [ ] Other  [ ] Other organ failure     LABS:                        8.7    15.3  )-----------( 236      ( 13 Oct 2018 16:53 )             25.0   10-13    135  |  104  |  102<H>  ----------------------------<  198<H>  3.8   |  12<L>  |  2.79<H>    Ca    8.1<L>      13 Oct 2018 16:53          RADIOLOGY & ADDITIONAL STUDIES:    NTERPRETATION:  CLINICAL INFORMATION: Pneumoperitoneum.    COMPARISON: 7/22/2014    PROCEDURE:   CT of the Chest, Abdomen and Pelvis was performed without intravenous   contrast.   Intravenous contrast: None.  Oral contrast: None.  Sagittal and coronal reformats were performed.    FINDINGS:    CHEST:     LUNGS AND LARGE AIRWAYS: Patent central airways. Scattered groundglass   and dense airspace opacities.      PLEURA: No pleural effusion.  VESSELS: Within normal limits.  HEART: Heart size is normal.  No pericardial effusion.      MEDIASTINUM AND MAX: No lymphadenopathy.   CHEST WALL AND LOWER NECK: Withinnormal limits.     ABDOMEN AND PELVIS:    LIVER: Within normal limits.   BILE DUCTS: Normal caliber.  GALLBLADDER: Within normal limits.  SPLEEN: Within normal limits.   PANCREAS: Within normal limits.  ADRENALS: Within normal limits.   KIDNEYS/URETERS: Within normal limits.     BLADDER: Within normal limits.   REPRODUCTIVE ORGANS: Fibroid uterus.    BOWEL: No bowel obstruction. Sigmoid diverticulosis. There is small   perisigmoidal collection of phlegmon and gas. Initially, there is   moderate amount of free intraperitoneal air with air in the mesentery as   well.  PERITONEUM: No ascites.   VESSELS:  Within normal limits.  RETROPERITONEUM: A 1.3 cm presacral node on image 209 is stable.    ABDOMINAL WALL: Within normal limits.  BONES: Multiple thoracolumbar compression deformities are new since 2014.    IMPRESSION:      Perforated sigmoid diverticulitis with moderate free air.        PROTEIN CALORIE MALNUTRITION:   [ ] PPSV2 < or = to 30% [ ] significant weight loss  [ ] poor nutritional intake [ ] catabolic state [ ] anasarca     Albumin, Serum: 2.1 g/dL (10-12-18 @ 18:01)  Artificial Nutrition [ ]     REFERRALS:   [ ]Chaplaincy  [ ] Hospice  [ ]Child Life  [ ]Social Work  [ ]Case management [ ]Holistic Therapy   Goals of Care Discussion Document: Goals of Care Conversation - Personal Advance Directive [LORNE Christian M. McAfee] (10-12-18 @ 15:31)
Patient is a 68y old  Female who presents with a chief complaint of     HPI: 67 y/o female hx of Polymyositis on steroids 40-60 daily since 2014 presents with global leg weakness. Per patient, has had a chronic wound on the L foot and leg that her boyfriend has been dressing. Pt had a previous debridement with skin graft application done in Oct 2015. She had an ankle fracture done in [facility]. She went there last week after a fall of this wound where they sent her out on antibiotics that she admits she did not take. She states that since then her wounds have gotten worse. States has diffuse pain in the area and now is so weak that she can not walk. No f/c, CP, SOB, N/V/D.   PMD: Alexis Mchugh      PAST MEDICAL & SURGICAL HISTORY:  Polymyositis  History of Tonsillectomy  No significant past surgical history      MEDICATIONS  (STANDING):  clindamycin IVPB 600 milliGRAM(s) IV Intermittent Once  vancomycin  IVPB 1000 milliGRAM(s) IV Intermittent once    MEDICATIONS  (PRN):      Allergies    penicillins (Unknown)    Intolerances        VITALS:    Vital Signs Last 24 Hrs  T(C): 36.4 (09 Oct 2018 13:37), Max: 36.4 (09 Oct 2018 13:20)  T(F): 97.6 (09 Oct 2018 13:37), Max: 97.6 (09 Oct 2018 13:20)  HR: 102 (09 Oct 2018 13:37) (102 - 106)  BP: 110/78 (09 Oct 2018 13:37) (108/87 - 110/78)  BP(mean): --  RR: 16 (09 Oct 2018 13:37) (16 - 16)  SpO2: 96% (09 Oct 2018 13:37) (94% - 96%)    LABS:                          17.8   23.5  )-----------( 285      ( 09 Oct 2018 14:28 )             50.7       10-09    128<L>  |  83<L>  |  138<H>  ----------------------------<  174<H>  4.1   |  18<L>  |  3.57<H>    Ca    9.8      09 Oct 2018 14:28    TPro  8.0  /  Alb  4.2  /  TBili  0.7  /  DBili  x   /  AST  10  /  ALT  17  /  AlkPhos  70  10-09      CAPILLARY BLOOD GLUCOSE              LOWER EXTREMITY PHYSICAL EXAM:    Vasular: DP/PT unable to assess, B/L, CFT < 3 seconds B/L, Temperature gradient warm to cool, B/L.   Neuro: Epicritic sensation  to the level of toes, B/L.  Musculoskeletal/Ortho:   Left legf: 6x6 hematoma at posteromedial distal calf w/ associated diffuse cellulitis, surrounding edema and ecchymosis, active serous drainage, but no purulence noted, full physical assessment limited due to pain. +Malodor, hematoma as well as leg and calf are cool to touch  Left foot: dorsolateral wound w/ fibronecrotic wound bed, wound bed w/ surrounding sloughing skin extending to the entire left dorsal foot,, w/ diffuse cellulitis, edema and ecchymosis, ++malodor, serosanguinous drainage,     RADIOLOGY & ADDITIONAL STUDIES:  < from: Xray Foot AP + Lateral + Oblique, Left (10.07.15 @ 18:29) >  EXAM:  RAD FOOT MIN 3 VIEWS LT        PROCEDURE DATE:  Oct  7 2015       INTERPRETATION:  CLINICAL INDICATION: Infected ulcer, r/o osteo/gas    TECHNIQUE: 3 views of the left foot were obtained on 10/7/2015    COMPARISON: None available.    FINDINGS:     Large dorsal soft tissue defect, consistent with ulcer. There are no bony   erosions, no cortical defects, no gas tracking, and no soft tissue   collections. There is no acute fracture or dislocation. Orthopedic   hardware is noted.  There is no surrounding lucency visualized.    IMPRESSION: There is no radiographic evidence of osteomyelitis.             HARDIK BLANTON M.D., RADIOLOGY RESIDENT  This document has been electronically signed.  LISA DOOLEY M.D.; ATTENDING RADIOLOGIST  This document has been electronically signed. Oct  8 2015  7:22A              < end of copied text >

## 2018-10-15 NOTE — CONSULT NOTE ADULT - ASSESSMENT
67 y/o female with chronic left leg ulcer hx of polymyositis a/w worsening weakness, excessive bleeding from leg ulcer found to have low h/h and perforated sigmoid diverticuli , patient refusing surgery,  psychiatry deemed patient to have capacity to make her own decisons, called for goals of care.

## 2018-10-15 NOTE — DIETITIAN INITIAL EVALUATION ADULT. - PROBLEM SELECTOR PLAN 6
- Hypotonic hyponatremia with urine Na<20 could be secondary to hypovolemia in the setting of sepsis and decreased PO intake  - Continue with IVF for hydration, monitor BMP daily

## 2018-10-15 NOTE — DIETITIAN INITIAL EVALUATION ADULT. - PROBLEM SELECTOR PLAN 9
- Has difficulty swallowing, but not following dysphagia diet at home  - Speech and swallow ordered, continue with dysphagia diet for now, aspiration precaution

## 2018-10-15 NOTE — DIETITIAN INITIAL EVALUATION ADULT. - OTHER INFO
Pt seen for length of stay. Pt fell 2 weeks ago and has been progressively weaker, flat affect at time of visit. Per RN Pt is depressed and does not have an appetite to eat. States Pt gets upset when staff persistently encourages her to eat. Pt reports nausea/vomiting. No constipation/diarrhea reported. S/p bedside swallow evaluation and Pt refused objective testing, wishing to remain on soft diet with thin liquids.

## 2018-10-15 NOTE — DIETITIAN INITIAL EVALUATION ADULT. - ENERGY NEEDS
ht: 64 inches, wt: 201.2 pounds, BMI: 34.5 kg/m2, IBW: 120 pounds (+/- 10%), 168 %IBW  Edema: 3+ dale leg. Skin per nursing documentation: cellulitis on L top of foot and L shin.  Other pertinent information: 67 y/o female who presents w/ a LLE ulceration and found to have perforated sigmoid diverticulitis with surgical abdomen by CT. After much discussion and determination of capacity by psychiatry pt is deemed capable of refusing surgery. Patient is now DNR/DNI and is on comfort measures only. Her abx and IVF were discontinue and she is only getting medications for pain and nausea control. Pending transfer to PCU.

## 2018-10-15 NOTE — PROGRESS NOTE ADULT - PROBLEM SELECTOR PLAN 5
likely secondary to sepsis. no longer trending multifactorial 2/2 sepsis/infection , hypotension and volume loss with lactic acidosis and renal dysfxn, likely multifactorial 2/2 sepsis/infection, hypotension and volume loss with lactic acidosis and renal dysfxn.

## 2018-10-15 NOTE — PROGRESS NOTE ADULT - PROBLEM SELECTOR PLAN 10
Extensive discussion had with patient. She is endorsing with her ongoing rheumatologic, poor quality of life, lack of familial support, inability to tolerate PO that she wishes to be DNR DNI. She endorses mild depression due to her disease process, deferring treatment but no suicidal or homicidal ideation. Deemed to have capacity by psychiatry on 10/13. Extensive discussion had with patient. She is endorsing with her ongoing rheumatologic, poor quality of life, lack of familial support, inability to tolerate PO that she wishes to be DNR DNI. She endorses mild depression due to her disease process, deferring treatment but no suicidal or homicidal ideation. Deemed to have capacity by psychiatry on 10/13.  - Transferred to palliative care unit 10/15

## 2018-10-15 NOTE — PROGRESS NOTE ADULT - SUBJECTIVE AND OBJECTIVE BOX
Dr. Augusto Jackson  Internal Medicine PGY-1   Pager# [phone]    Patient is a 68y old  Female who presents with a chief complaint of Leg weakness (13 Oct 2018 21:25)      SUBJECTIVE / OVERNIGHT EVENTS: No acute overnight events.    MEDICATIONS  (STANDING):  hydrocortisone sodium succinate Injectable 50 milliGRAM(s) IV Push every 8 hours  influenza   Vaccine 0.5 milliLiter(s) IntraMuscular once  piperacillin/tazobactam IVPB. 3.375 Gram(s) IV Intermittent every 8 hours  sodium chloride 0.9%. 1000 milliLiter(s) (100 mL/Hr) IV Continuous <Continuous>    MEDICATIONS  (PRN):  HYDROmorphone  Injectable 1 milliGRAM(s) IV Push every 4 hours PRN Severe Pain (7 - 10)  ondansetron Injectable 4 milliGRAM(s) IV Push every 6 hours PRN Nausea and/or Vomiting      Vital Signs Last 24 Hrs  T(C): 36.3 (13 Oct 2018 22:54), Max: 36.4 (13 Oct 2018 14:15)  T(F): 97.3 (13 Oct 2018 22:54), Max: 97.5 (13 Oct 2018 14:15)  HR: 108 (13 Oct 2018 22:54) (93 - 108)  BP: 98/64 (13 Oct 2018 22:54) (98/64 - 108/72)  BP(mean): --  RR: 18 (13 Oct 2018 22:54) (17 - 18)  SpO2: 98% (13 Oct 2018 22:54) (91% - 98%)  CAPILLARY BLOOD GLUCOSE        I&O's Summary      PHYSICAL EXAM:  GENERAL: depressed mood, flat affect  HEAD:  Atraumatic, b/l facial droop  CHEST/LUNG: Clear to auscultation bilaterally; No wheeze  HEART: Regular rate and rhythm; No murmurs, rubs, or gallops  ABDOMEN: Soft, Nondistended; nontender, Bowel sounds present  EXTREMITIES:  2+ Peripheral Pulses. LLE wound wrapped, dressing c/d/i, b/l 2+ pitting edema  PSYCH: AAOx3  NEUROLOGY: non-focal  SKIN: No rashes or lesions    LABS:                        8.7    15.3  )-----------( 236      ( 13 Oct 2018 16:53 )             25.0     10-13    135  |  104  |  102<H>  ----------------------------<  198<H>  3.8   |  12<L>  |  2.79<H>    Ca    8.1<L>      13 Oct 2018 16:53  Phos  6.8     10-12  Mg     2.6     10-12    TPro  5.1<L>  /  Alb  2.1<L>  /  TBili  0.4  /  DBili  x   /  AST  13  /  ALT  16  /  AlkPhos  87  10-12    PT/INR - ( 12 Oct 2018 17:51 )   PT: 13.4 sec;   INR: 1.24 ratio         PTT - ( 12 Oct 2018 17:51 )  PTT:42.4 sec          RADIOLOGY & ADDITIONAL TESTS:    Imaging Personally Reviewed:    Consultant(s) Notes Reviewed:  podiatry    Care Discussed with Consultants/Other Providers: Dr. Augusto Jackson  Internal Medicine PGY-1   Pager# [phone]    Patient is a 68y old  Female who presents with a chief complaint of Leg weakness (13 Oct 2018 21:25)      SUBJECTIVE / OVERNIGHT EVENTS: No acute overnight events. Pt endorses contd pain in LLE and in abdomen though well controlled w/ current pain regimen. Denies f/c/n/v, SOB, CP.    Being transferred to palliative care unit today.    MEDICATIONS  (STANDING):  hydrocortisone sodium succinate Injectable 50 milliGRAM(s) IV Push every 8 hours  influenza   Vaccine 0.5 milliLiter(s) IntraMuscular once  piperacillin/tazobactam IVPB. 3.375 Gram(s) IV Intermittent every 8 hours  sodium chloride 0.9%. 1000 milliLiter(s) (100 mL/Hr) IV Continuous <Continuous>    MEDICATIONS  (PRN):  HYDROmorphone  Injectable 1 milliGRAM(s) IV Push every 4 hours PRN Severe Pain (7 - 10)  ondansetron Injectable 4 milliGRAM(s) IV Push every 6 hours PRN Nausea and/or Vomiting      Vital Signs Last 24 Hrs  T(C): 36.4 (15 Oct 2018 11:33), Max: 36.8 (15 Oct 2018 00:32)  T(F): 97.5 (15 Oct 2018 11:33), Max: 98.3 (15 Oct 2018 00:32)  HR: 95 (15 Oct 2018 11:33) (60 - 95)  BP: 119/85 (15 Oct 2018 11:33) (119/85 - 125/67)  BP(mean): --  RR: 18 (15 Oct 2018 11:33) (18 - 18)  SpO2: 100% (15 Oct 2018 11:33) (99% - 100%)    CAPILLARY BLOOD GLUCOSE      I&O's Summary      PHYSICAL EXAM:  GENERAL: somnolent, depressed mood, flat affect  HEAD:  Atraumatic, b/l facial droop  CHEST/LUNG: Clear to auscultation bilaterally anteriorly, unable to assess posteriorly; No wheeze  HEART: Regular rate and rhythm; No murmurs, rubs, or gallops  ABDOMEN: Soft, Nondistended; nontender, Bowel sounds present  EXTREMITIES:  2+ Peripheral Pulses. LLE wound wrapped, dressed w/ moderate drainage but no bleeding noted, b/l 2+ pitting edema, no erythema  PSYCH: AAOx3  NEUROLOGY: non-focal  SKIN: No rashes or lesions    LABS:                        8.7    15.3  )-----------( 236      ( 13 Oct 2018 16:53 )             25.0     10-13    135  |  104  |  102<H>  ----------------------------<  198<H>  3.8   |  12<L>  |  2.79<H>    Ca    8.1<L>      13 Oct 2018 16:53  Phos  6.8     10-12  Mg     2.6     10-12    TPro  5.1<L>  /  Alb  2.1<L>  /  TBili  0.4  /  DBili  x   /  AST  13  /  ALT  16  /  AlkPhos  87  10-12    PT/INR - ( 12 Oct 2018 17:51 )   PT: 13.4 sec;   INR: 1.24 ratio         PTT - ( 12 Oct 2018 17:51 )  PTT:42.4 sec        RADIOLOGY & ADDITIONAL TESTS:    Imaging Personally Reviewed:    Consultant(s) Notes Reviewed:  podiatry    Care Discussed with Consultants/Other Providers: Patient is a 68y old  Female who presents with a chief complaint of Leg weakness (13 Oct 2018 21:25)      SUBJECTIVE / OVERNIGHT EVENTS: No acute overnight events. Pt endorses contd pain in LLE and in abdomen though well controlled w/ current pain regimen. Denies f/c/n/v, SOB, CP.    Being transferred to palliative care unit today.    MEDICATIONS  (STANDING):  hydrocortisone sodium succinate Injectable 50 milliGRAM(s) IV Push every 8 hours  influenza   Vaccine 0.5 milliLiter(s) IntraMuscular once  piperacillin/tazobactam IVPB. 3.375 Gram(s) IV Intermittent every 8 hours  sodium chloride 0.9%. 1000 milliLiter(s) (100 mL/Hr) IV Continuous <Continuous>    MEDICATIONS  (PRN):  HYDROmorphone  Injectable 1 milliGRAM(s) IV Push every 4 hours PRN Severe Pain (7 - 10)  ondansetron Injectable 4 milliGRAM(s) IV Push every 6 hours PRN Nausea and/or Vomiting      Vital Signs Last 24 Hrs  T(C): 36.4 (15 Oct 2018 11:33), Max: 36.8 (15 Oct 2018 00:32)  T(F): 97.5 (15 Oct 2018 11:33), Max: 98.3 (15 Oct 2018 00:32)  HR: 95 (15 Oct 2018 11:33) (60 - 95)  BP: 119/85 (15 Oct 2018 11:33) (119/85 - 125/67)  BP(mean): --  RR: 18 (15 Oct 2018 11:33) (18 - 18)  SpO2: 100% (15 Oct 2018 11:33) (99% - 100%)    CAPILLARY BLOOD GLUCOSE      I&O's Summary      PHYSICAL EXAM:  GENERAL: somnolent, depressed mood, flat affect  HEAD:  Atraumatic, b/l facial droop  CHEST/LUNG: Clear to auscultation bilaterally anteriorly, unable to assess posteriorly; No wheeze  HEART: Regular rate and rhythm; No murmurs, rubs, or gallops  ABDOMEN: TTP diffusely, firm, hypoactive BS  EXTREMITIES:  2+ Peripheral Pulses. LLE wound wrapped, dressed w/ moderate drainage but no bleeding noted, b/l 2+ pitting edema, no erythema  PSYCH: AAOx3  NEUROLOGY: non-focal  SKIN: No rashes or lesions    LABS:                        8.7    15.3  )-----------( 236      ( 13 Oct 2018 16:53 )             25.0     10-13    135  |  104  |  102<H>  ----------------------------<  198<H>  3.8   |  12<L>  |  2.79<H>    Ca    8.1<L>      13 Oct 2018 16:53  Phos  6.8     10-12  Mg     2.6     10-12    TPro  5.1<L>  /  Alb  2.1<L>  /  TBili  0.4  /  DBili  x   /  AST  13  /  ALT  16  /  AlkPhos  87  10-12    PT/INR - ( 12 Oct 2018 17:51 )   PT: 13.4 sec;   INR: 1.24 ratio         PTT - ( 12 Oct 2018 17:51 )  PTT:42.4 sec        RADIOLOGY & ADDITIONAL TESTS:    Imaging Personally Reviewed:    Consultant(s) Notes Reviewed:  podiatry    Care Discussed with Consultants/Other Providers:

## 2018-10-15 NOTE — DIETITIAN INITIAL EVALUATION ADULT. - PROBLEM SELECTOR PLAN 2
- Patient presented with leukocytosis, tachycardia meeting SIRS criteria likely source is LLE cellulitis; patient also was found to be hypotensive SBP lowest 85, responded to 2 L NS bolus  - s/p 1x Vanc and Clindamycin the ED, continue with Vancomycin for MRSA (by level), adding Cefepime for other gram positive and gram negative/anaerobe coverage while pending blood cultures  - Continue with IVF, monitor vitals Q4H, continue with stress dose steroid

## 2018-10-15 NOTE — PROGRESS NOTE ADULT - ASSESSMENT
Patient is a 68y old  Female Hx multiple falls, LLE ankle and leg fracture s/p ORIF w/ rods and screws, polymyositis on chronic solumedrol 40mg daily who presents w/ a LLE ulceration and worsening erythema on outpt abx found to be tachycardic w/ leukocytosis and hypotension responsive to fluids and MAI concerning for severe sepsis 2/2 cellulitis course c/b LLE hemorrhage s/p punch biopsy w/ associated hypotension concerning for hemorrhagic shock now stable though w/ soft BPs. pt found to have perforated sigmoid diverticulitis with surgical abdomen by CT. After much discussion and determination of capacity by psychiatry pt is deemed capable of refusing surgery. Patient is now DNR/DNI and is on comfort measures only. Her abx and IVF were discontinue and she is only getting medications for pain and nausea control. Patient is a 68y old  Female Hx multiple falls, LLE ankle and leg fracture s/p ORIF w/ rods and screws, polymyositis on chronic solumedrol 40mg daily who presents w/ a LLE ulceration and worsening erythema on outpt abx found to be tachycardic w/ leukocytosis and hypotension responsive to fluids and MAI concerning for severe sepsis 2/2 cellulitis course c/b LLE hemorrhage s/p punch biopsy w/ associated hypotension concerning for hemorrhagic shock now stable though w/ soft BPs. pt found to have perforated sigmoid diverticulitis with surgical abdomen by CT. After much discussion and determination of capacity by psychiatry pt is deemed capable of refusing surgery. Patient is now DNR/DNI and is on comfort measures only. Her abx and IVF were discontinue and she is only getting medications for pain and nausea control. Pending transfer to palliative care unit.

## 2018-10-15 NOTE — DIETITIAN INITIAL EVALUATION ADULT. - PROBLEM SELECTOR PLAN 7
- Patient endorses decreased appetite and weakness before the fall could be secondary to secondary adrenal insufficiency in the setting of chronic steroid use  - Continue with stress dose steroid Hydrocortisone 100 mg Q8H

## 2018-10-15 NOTE — CONSULT NOTE ADULT - PROBLEM SELECTOR RECOMMENDATION 3
Per discussion with partner Nathan,  he has known patient for many years and would be willing to take her home and care for her as he had done with his mother

## 2018-10-15 NOTE — PROGRESS NOTE ADULT - PROBLEM SELECTOR PLAN 7
- c/w current diet (pleasure feeds)  - Soft diet Pt states uncertain of diagnosis, per PMD records PMH of polymyositis, dermatomyositis, CREST syndrome  - on hydrocortisone given relative adrenal insufficiency, taper   - Rheum panel pending; however pt now comfort care only. Her quality of life was declining over past several years and pt never received true diagnosis. pt was unable to care for self and now with life threatening infection/ declining treatment. peritoneal signs likely not detected on examination 2/2 chronic steroid use. Pt states uncertain of diagnosis, per PMD records PMH of polymyositis, dermatomyositis, CREST syndrome  - on hydrocortisone given relative adrenal insufficiency, tapering   - Rheum panel pending; however pt now comfort care only. Her quality of life was declining over past several years and pt never received true diagnosis. pt was unable to care for self and now with life threatening infection/ declining treatment. peritoneal signs likely not detected on examination 2/2 chronic steroid use.

## 2018-10-15 NOTE — DIETITIAN INITIAL EVALUATION ADULT. - PROBLEM SELECTOR PLAN 10
- DVT prophylaxis: holding pharm VTE in the setting of large hematoma  - GI prophylaxis: Pantoprazole PO in the setting of chronic steroid use  - Diet: Dysphagia 3 diet    Whitney Caro PGY-2  Internal medicine night admit  Pager [phone]

## 2018-10-15 NOTE — PROGRESS NOTE ADULT - PROBLEM SELECTOR PLAN 2
-2/2 bleeding profusely from LLE from punchy biopsy site on 10/12. hemostasis achieved and pt stabilized after RRT  - Pt deferred further intervention preferring comfort measures. Episode of bleeding profusely from LLE from punchy biopsy site on 10/12. hemostasis achieved and pt stabilized after RRT  - Pt deferred further intervention preferring comfort measures.

## 2018-10-15 NOTE — CONSULT NOTE ADULT - PROBLEM SELECTOR RECOMMENDATION 4
Met with patient,  clearly stated she refused any surgical interventions.  Just wants to be kept comfortable.   Continued conversation to take place in PCU.  Transfer to PCU when bed available.

## 2018-10-15 NOTE — DIETITIAN INITIAL EVALUATION ADULT. - PROBLEM SELECTOR PLAN 4
- Cr 3.57 on admission with baseline Cr in 2015 0.5 with FeNa 0.5% likely pre-renal in the setting of severe sepsis   - Continue with IVF for hydration, urine lytes pending for FeUrea calculation  - Continue to monitor BMP daily, monitor UOP, avoid nephrotoxic agents, holding Lasix for now, bladder scan  - Consider renal consult in the am

## 2018-10-16 DIAGNOSIS — R11.2 NAUSEA WITH VOMITING, UNSPECIFIED: ICD-10-CM

## 2018-10-16 DIAGNOSIS — R52 PAIN, UNSPECIFIED: ICD-10-CM

## 2018-10-16 LAB
CCP IGG SERPL-ACNC: 10 UNITS — SIGNIFICANT CHANGE UP (ref 0–19)
DSDNA AB SER-ACNC: <12 IU/ML — SIGNIFICANT CHANGE UP
RF+CCP IGG SER-IMP: NEGATIVE — SIGNIFICANT CHANGE UP

## 2018-10-16 PROCEDURE — 99233 SBSQ HOSP IP/OBS HIGH 50: CPT | Mod: GC

## 2018-10-16 RX ADMIN — HYDROMORPHONE HYDROCHLORIDE 2 MILLIGRAM(S): 2 INJECTION INTRAMUSCULAR; INTRAVENOUS; SUBCUTANEOUS at 01:50

## 2018-10-16 RX ADMIN — Medication 50 MILLIGRAM(S): at 06:35

## 2018-10-16 RX ADMIN — HYDROMORPHONE HYDROCHLORIDE 2 MILLIGRAM(S): 2 INJECTION INTRAMUSCULAR; INTRAVENOUS; SUBCUTANEOUS at 09:00

## 2018-10-16 RX ADMIN — HYDROMORPHONE HYDROCHLORIDE 2 MILLIGRAM(S): 2 INJECTION INTRAMUSCULAR; INTRAVENOUS; SUBCUTANEOUS at 09:15

## 2018-10-16 RX ADMIN — HYDROMORPHONE HYDROCHLORIDE 2 MILLIGRAM(S): 2 INJECTION INTRAMUSCULAR; INTRAVENOUS; SUBCUTANEOUS at 18:40

## 2018-10-16 RX ADMIN — HYDROMORPHONE HYDROCHLORIDE 2 MILLIGRAM(S): 2 INJECTION INTRAMUSCULAR; INTRAVENOUS; SUBCUTANEOUS at 01:38

## 2018-10-16 RX ADMIN — Medication 50 MILLIGRAM(S): at 17:42

## 2018-10-16 RX ADMIN — HYDROMORPHONE HYDROCHLORIDE 2 MILLIGRAM(S): 2 INJECTION INTRAMUSCULAR; INTRAVENOUS; SUBCUTANEOUS at 18:23

## 2018-10-16 NOTE — PROGRESS NOTE ADULT - PROBLEM SELECTOR PLAN 5
Will make sure patient receive pain meds prior to positioning  Will evaluate breakthrough needs over 24 hours before consideration of transition to po.   Hospice referral for home with hospice   DNR/DNI

## 2018-10-16 NOTE — PROGRESS NOTE ADULT - SUBJECTIVE AND OBJECTIVE BOX
GAP TEAM PALLIATIVE CARE UNIT PROGRESS NOTE:      [  ] Patient on hospice program.    INDICATION FOR PALLIATIVE CARE UNIT SERVICES:  Comfort care and symptom management     INTERVAL HPI/OVERNIGHT EVENTS:  1 BT Dilaudid for pain (mainly positional and multiple points of pain)  1 BT Zofran (vomited last night x 1)    DNR on chart: Yes  Allergies  penicillins (Unknown)    Intolerances    MEDICATIONS  (STANDING):  hydrocortisone sodium succinate Injectable 50 milliGRAM(s) IV Push every 12 hours    MEDICATIONS  (PRN):  HYDROmorphone  Injectable 2 milliGRAM(s) IV Push every 1 hour PRN dyspnea  HYDROmorphone  Injectable 2 milliGRAM(s) IV Push every 1 hour PRN pain  LORazepam   Injectable 0.5 milliGRAM(s) IV Push every 2 hours PRN anxiety/agitation  ondansetron Injectable 4 milliGRAM(s) IV Push every 6 hours PRN Nausea and/or Vomiting    ITEMS UNCHECKED ARE NOT PRESENT    PRESENT SYMPTOMS: [ ]Unable to obtain due to poor mentation     Source if other than patient:  [ ]Family   [ ]Team     Pain (Impact on QOL):  debilitating   Location:     leg ulcer, abdominal pain   Minimal acceptable level (0-10 scale): 3/10  Aggravating factors: positioning   Quality:  Radiation:  Severity (0-10 scale): 10/10    Dyspnea:                           [ ]Mild [ ]Moderate [ ]Severe  Anxiety:                             [ ]Mild [ ]Moderate [ ]Severe  Fatigue:                             [ ]Mild [ ]Moderate [ ]Severe  Nausea:                             [ ]Mild [ ]Moderate [ ]Severe  Loss of appetite:                [ ]Mild [ ]Moderate [ ]Severe  Constipation:                     [ ]Mild [ ]Moderate [ ]Severe    PAINAD Score: 	  Other Symptoms:  [ ]All other review of systems negative     Karnofsky Performance Score/Palliative Performance Status Version 2:    40     %          PHYSICAL EXAM:  Vital Signs Last 24 Hrs  T(C): 30.9 (16 Oct 2018 08:02), Max: 36.6 (15 Oct 2018 23:40)  T(F): 87.7 (16 Oct 2018 08:02), Max: 97.8 (15 Oct 2018 23:40)  HR: 92 (16 Oct 2018 08:02) (83 - 92)  BP: 129/88 (16 Oct 2018 08:02) (129/88 - 145/90)  BP(mean): --  RR: 18 (16 Oct 2018 08:02) (18 - 18)  SpO2: 99% (16 Oct 2018 08:02) (99% - 100%) I&O's Summary    15 Oct 2018 07:01  -  16 Oct 2018 07:00  --------------------------------------------------------  IN: 240 mL / OUT: 0 mL / NET: 240 mL    GENERAL:  [ x]Alert  [x ]Oriented x 3   [ ]Lethargic  [ ]Cachexia  [ ]Unarousable  [ x]Verbal  [ ]Non-Verbal    Behavioral:   [ ] Anxiety  [ ] Delirium [ ] Agitation [ ] Other    HEENT:  [x ]Normal   [ ]Dry mouth   [ ]ET Tube/Trach  [ ]Oral lesions    PULMONARY:   [x ]Clear [ ]Tachypnea  [ ]Audible excessive secretions   [ ]Rhonchi        [ ]Right [ ]Left [ ]Bilateral  [ ]Crackles        [ ]Right [ ]Left [ ]Bilateral  [ ]Wheezing     [ ]Right [ ]Left [ ]Bilateral    CARDIOVASCULAR:    [x ]Regular [ ]Irregular [ ]Tachy  [ ]Raza [ ]Murmur [ ]Other    GASTROINTESTINAL:  [ ]Soft  [ ]Distended   [ ]+BS  [ ]Non tender [x ]Tender  [ ]PEG [ ]OGT/ NGT   Last BM: 10/16       GENITOURINARY:  [ ]Normal [x ] Incontinent   [ ]Oliguria/Anuria   [ ]Posey    MUSCULOSKELETAL:   [ ]Normal   [ ]Weakness  [x ]Bed/Wheelchair bound [ ]Edema    NEUROLOGIC:   [ x]No focal deficits  [ ] Cognitive impairment  [ ] Dysphagia [ ]Dysarthria [ ] Paresis [ ]Other     SKIN:   [ ]Normal   [ ]Pressure ulcer(s)  [ ]Rash (x) left LE ulcer     CRITICAL CARE:  [ ] Shock Present  [ ]Septic [ ]Cardiogenic [ ]Neurologic [ ]Hypovolemic  [ ]  Vasopressors [ ]  Inotropes   [ ] Respiratory failure present  [ ] Acute  [ ] Chronic [ ] Hypoxic  [ ] Hypercarbic [ ] Other  [ ] Other organ failure     LABS: no new labs for review     RADIOLOGY & ADDITIONAL STUDIES: no new imaging for review     PROTEIN CALORIE MALNUTRITION: [ ] yes   [ ]no  [ ] PPSV2 < or = 30% [ ] significant weight loss [ ] poor nutritional intake [ ] anasarca [ ] catabolic state   Albumin, Serum: 2.1 g/dL (10-12-18 @ 18:01)   Artificial Nutrition [ ]     REFERRALS:   [ ]Chaplaincy  [ ] Hospice  [ ]Child Life  [ ]Social Work  [ ]Case management [ ]Holistic Therapy [ ] Physical Therapy [ ] Dietary   Goals of Care Document: Goals of Care Conversation - Personal Advance Directive [LORNE Christian M. McAfee] (10-12-18 @ 15:31) GAP TEAM PALLIATIVE CARE UNIT PROGRESS NOTE:      [  ] Patient on hospice program.    INDICATION FOR PALLIATIVE CARE UNIT SERVICES:  Comfort care and symptom management     INTERVAL HPI/OVERNIGHT EVENTS:  1 BT Dilaudid for pain (mainly positional and multiple points of pain)  1 BT Zofran (vomited last night x 1)    DNR on chart: Yes  Allergies  penicillins (Unknown)    Intolerances    MEDICATIONS  (STANDING):  hydrocortisone sodium succinate Injectable 50 milliGRAM(s) IV Push every 12 hours    MEDICATIONS  (PRN):  HYDROmorphone  Injectable 2 milliGRAM(s) IV Push every 1 hour PRN dyspnea  HYDROmorphone  Injectable 2 milliGRAM(s) IV Push every 1 hour PRN pain  LORazepam   Injectable 0.5 milliGRAM(s) IV Push every 2 hours PRN anxiety/agitation  ondansetron Injectable 4 milliGRAM(s) IV Push every 6 hours PRN Nausea and/or Vomiting    ITEMS UNCHECKED ARE NOT PRESENT    PRESENT SYMPTOMS: [ ]Unable to obtain due to poor mentation     Source if other than patient:  [ ]Family   [ ]Team     Pain (Impact on QOL):  debilitating   Location:     leg ulcer, abdominal pain   Minimal acceptable level (0-10 scale): 3/10  Aggravating factors: positioning   Quality:  Radiation:  Severity (0-10 scale): 10/10    Dyspnea:                           [ ]Mild [ ]Moderate [ ]Severe  Anxiety:                             [ ]Mild [ ]Moderate [ ]Severe  Fatigue:                             [ ]Mild [ ]Moderate [ ]Severe  Nausea:                             [ ]Mild [ ]Moderate [ ]Severe  Loss of appetite:                [ ]Mild [ ]Moderate [ ]Severe  Constipation:                     [ ]Mild [ ]Moderate [ ]Severe    PAINAD Score: 	  Other Symptoms:  [ ]All other review of systems negative     Karnofsky Performance Score/Palliative Performance Status Version 2:    30     %          PHYSICAL EXAM:  Vital Signs Last 24 Hrs  T(C): 30.9 (16 Oct 2018 08:02), Max: 36.6 (15 Oct 2018 23:40)  T(F): 87.7 (16 Oct 2018 08:02), Max: 97.8 (15 Oct 2018 23:40)  HR: 92 (16 Oct 2018 08:02) (83 - 92)  BP: 129/88 (16 Oct 2018 08:02) (129/88 - 145/90)  BP(mean): --  RR: 18 (16 Oct 2018 08:02) (18 - 18)  SpO2: 99% (16 Oct 2018 08:02) (99% - 100%) I&O's Summary    15 Oct 2018 07:01  -  16 Oct 2018 07:00  --------------------------------------------------------  IN: 240 mL / OUT: 0 mL / NET: 240 mL    GENERAL:  [ x]Alert  [x ]Oriented x 3   [ ]Lethargic  [ ]Cachexia  [ ]Unarousable  [ x]Verbal  [ ]Non-Verbal    Behavioral:   [ ] Anxiety  [ ] Delirium [ ] Agitation [ ] Other    HEENT:  [x ]Normal   [ ]Dry mouth   [ ]ET Tube/Trach  [ ]Oral lesions    PULMONARY:   [x ]Clear [ ]Tachypnea  [ ]Audible excessive secretions   [ ]Rhonchi        [ ]Right [ ]Left [ ]Bilateral  [ ]Crackles        [ ]Right [ ]Left [ ]Bilateral  [ ]Wheezing     [ ]Right [ ]Left [ ]Bilateral    CARDIOVASCULAR:    [x ]Regular [ ]Irregular [ ]Tachy  [ ]Raza [ ]Murmur [ ]Other    GASTROINTESTINAL:  [ ]Soft  [ ]Distended   [ ]+BS  [ ]Non tender [x ]Tender  [ ]PEG [ ]OGT/ NGT   Last BM: 10/16       GENITOURINARY:  [ ]Normal [x ] Incontinent   [ ]Oliguria/Anuria   [ ]Posey    MUSCULOSKELETAL:   [ ]Normal   [ ]Weakness  [x ]Bed/Wheelchair bound [ ]Edema    NEUROLOGIC:   [ x]No focal deficits  [ ] Cognitive impairment  [ ] Dysphagia [ ]Dysarthria [ ] Paresis [ ]Other     SKIN:   [ ]Normal   [ ]Pressure ulcer(s)  [ ]Rash (x) left LE ulcer     CRITICAL CARE:  [ ] Shock Present  [ ]Septic [ ]Cardiogenic [ ]Neurologic [ ]Hypovolemic  [ ]  Vasopressors [ ]  Inotropes   [ ] Respiratory failure present  [ ] Acute  [ ] Chronic [ ] Hypoxic  [ ] Hypercarbic [ ] Other  [ ] Other organ failure     LABS: no new labs for review     RADIOLOGY & ADDITIONAL STUDIES: no new imaging for review     PROTEIN CALORIE MALNUTRITION: [x ] yes   [ ]no  [x ] PPSV2 < or = 30% [ ] significant weight loss [x ] poor nutritional intake [ ] anasarca [ ] catabolic state   Albumin, Serum: 2.1 g/dL (10-12-18 @ 18:01)   Artificial Nutrition [ ]     REFERRALS:   [ ]Chaplaincy  [ ] Hospice  [ ]Child Life  [ x]Social Work  [ ]Case management [ ]Holistic Therapy [ ] Physical Therapy [ ] Dietary   Goals of Care Document: Goals of Care Conversation - Personal Advance Directive [LORNE Christian M. McAfee] (10-12-18 @ 15:31)

## 2018-10-16 NOTE — PROGRESS NOTE ADULT - PROBLEM SELECTOR PLAN 1
Patient w/ perforation refusing surgery   Patient has the capacity to refuse  Will continue with symptom management and supportive care.

## 2018-10-16 NOTE — MEDICAL STUDENT PROGRESS NOTE(EDUCATION) - SUBJECTIVE AND OBJECTIVE BOX
ANITA is a 69 yo woman with a history of polymyositis, a chronic left foot ulcer, and a hematoma on her left calf, which she developed 3 weeks ago after a fall, who presented to the ED 1 week ago with progressive left leg weakness since her fall 2 weeks prior. In the ED, she was treated with Abx for severe sepsis secondary to LLE cellulitis, resulting from her recent fall. Podiatry performed a punch biopsy of both LLE wounds, after which an RRT was called due to bleeding and hypotension. CXR during the RRT showed a pneumoperitoneum, which prompted a CT abdomen. The CT showed a likely perforated diverticulitis.  Surgery recommended an ex lap, with likely bowel resection and ostomy, but the patient refused and was deemed to have capacity to make this refusal by psychiatry. The patient was then transferred to the PCU for comfort care and made DNR/DNI.   The patient had no acute events overnight. She received one breakthrough dose of Dilaudid PRN for pain and 1 dose of Zofran PRN for nausea due to an episode of vomiting yesterday afternoon. She states that her pain at rest has been controlled, though the pain is exacerbated when she needs to be moved. Her last bowel movement was this morning. Subjective    HPI: ANITA is a 67 yo woman with a history of polymyositis, a chronic left foot ulcer, and a hematoma on her left calf, which she developed 3 weeks ago after a fall, who presented to the ED 1 week ago with progressive left leg weakness since her fall 2 weeks prior. In the ED, she was treated with Abx for severe sepsis secondary to LLE cellulitis, resulting from her recent fall. Podiatry performed a punch biopsy of both LLE wounds, after which an RRT was called due to bleeding and hypotension. CXR during the RRT showed a pneumoperitoneum, which prompted a CT abdomen. The CT showed a likely perforated diverticulitis.  Surgery recommended an ex lap, with likely bowel resection and ostomy, but the patient refused and was deemed to have capacity to make this refusal by psychiatry. The patient was then transferred to the PCU for comfort care and made DNR/DNI.     The patient had no acute events overnight. She received one breakthrough dose of Dilaudid PRN for pain and 1 dose of Zofran PRN for nausea due to an episode of vomiting yesterday afternoon. She states that her pain at rest has been controlled, though the pain is exacerbated when she needs to be moved. Her last bowel movement was this morning.      Objective    Vitals: Temp: 87.7 F axillary, HR: 92, BP: 129/88, RR: 18, O2 Sat: 100% on RA    Physical exam:  General: Pt is lying in bed in NAD  Lungs: clear to auscultation bilaterally; no rales, rhonchi, wheezes  Cardiac: normal S1, S2; normal rate and rhythm  Abd: soft, diffusely tender, mildly distended  Neuro: strength and general sensation grossly in tact    No new labs Subjective    HPI: ANITA is a 67 yo woman with a history of polymyositis, a chronic left foot ulcer, and a hematoma on her left calf, which she developed 3 weeks ago after a fall, who presented to the ED 1 week ago with progressive left leg weakness since her fall 2 weeks prior. In the ED, she was treated with Abx for severe sepsis secondary to LLE cellulitis, resulting from her recent fall. Podiatry performed a punch biopsy of both LLE wounds, after which an RRT was called due to bleeding and hypotension. CXR during the RRT showed a pneumoperitoneum, which prompted a CT abdomen. The CT showed a likely perforated diverticulitis.  Surgery recommended an ex lap, with likely bowel resection and ostomy, but the patient refused and was deemed to have capacity to make this refusal by psychiatry. The patient was then transferred to the PCU for comfort care and made DNR/DNI.     The patient had no acute events overnight. She received one breakthrough dose of Dilaudid PRN for pain and 1 dose of Zofran PRN for nausea due to an episode of vomiting yesterday afternoon. She states that her pain at rest has been controlled, though the pain is exacerbated when she needs to be moved. Her last bowel movement was this morning.      Objective    Vitals: Temp: 87.7 F axillary, HR: 92, BP: 129/88, RR: 18, O2 Sat: 100% on RA    Physical exam:  General: Pt is lying in bed in NAD  Lungs: clear to auscultation bilaterally; no rales, rhonchi, wheezes  Cardiac: normal S1, S2; normal rate and rhythm  Abd: soft, diffusely tender, mildly distended  Neuro: AAO x 3; strength and general sensation grossly in tact  Skin: no rashes; + swelling in the LLE    No new labs    PPS Level 30% Subjective    HPI: ANITA is a 67 yo woman with a history of polymyositis, a chronic left foot ulcer, and a hematoma on her left calf, which she developed 3 weeks ago after a fall, who presented to the ED 1 week ago with progressive left leg weakness since her fall 2 weeks prior. In the ED, she was treated with Abx for severe sepsis secondary to LLE cellulitis, resulting from her recent fall. Podiatry performed a punch biopsy of both LLE wounds, after which an RRT was called due to bleeding and hypotension. CXR during the RRT showed a pneumoperitoneum, which prompted a CT abdomen. The CT showed a likely perforated diverticulitis.  Surgery recommended an ex lap, with likely bowel resection and ostomy, but the patient refused and was deemed to have capacity to make this refusal by psychiatry. The patient was then transferred to the PCU for comfort care and made DNR/DNI.     The patient had no acute events overnight. She received 2 breakthrough doses of Dilaudid PRN for pain and 1 dose of Zofran PRN for nausea due to an episode of vomiting yesterday afternoon. She states that her pain at rest has been controlled, though the pain is exacerbated when she needs to be moved. Her last bowel movement was this morning.      Objective    Vitals: Temp: 87.7 F axillary, HR: 92, BP: 129/88, RR: 18, O2 Sat: 100% on RA    Physical exam:  General: Pt is lying in bed in NAD  Lungs: clear to auscultation bilaterally; no rales, rhonchi, wheezes  Cardiac: normal S1, S2; normal rate and rhythm  Abd: soft, diffusely tender, mildly distended  Neuro: AAO x 3; strength and general sensation grossly in tact  Skin: no rashes; + swelling in the LLE    No new labs    PPS Level 30%

## 2018-10-16 NOTE — MEDICAL STUDENT PROGRESS NOTE(EDUCATION) - NS MD HP STUD ASPLAN PLAN FT
Problem/Recommendation - 1:  Problem: Sigmoid diverticulitis.  Patient refusing surgery  Deemed to have capacity per Psych.     Problem/Recommendation - 2:  ·  Problem: Hemorrhagic shock.  Recommendation: Required transfusions from low H/H  Chronic left leg ulcer.      Problem/Recommendation - 3:  ·  Problem: Debility.  Recommendation: Partner Nathan is willing to take the patient home with hospice Problem/Recommendation - 1:  Problem: Sigmoid diverticulitis.  Patient refusing surgery  Deemed to have capacity per Psych.     Problem/Recommendation - 2:  ·  Problem: Hemorrhagic shock.  Recommendation: Required transfusions from low H/H  Chronic left leg ulcer.      Problem/Recommendation - 3:  ·  Problem: Debility.  Recommendation: Partner Nathan is willing to take the patient home with hospice     Problem/Recommendation - 4:  ·  Problem: Pain.  Recommendation: Dilaudid 2 mg IVP q 1H and 2 mg IVP q 1H PRN  Will transition to PO prior to discharge Problem/Recommendation - 1:  ·  Problem: Pain.  Recommendation: Dilaudid 2 mg IVP q 1H and 2 mg IVP q 1H PRN  Will transition to PO prior to discharge     Problem/Recommendation - 2:  Problem: Sigmoid diverticulitis.  Patient refusing surgery  Deemed to have capacity per Psych.     Problem/Recommendation - 3:  ·  Problem: Hemorrhagic shock.  Recommendation: Required transfusions from low H/H  Chronic left leg ulcer.      Problem/Recommendation - 4:  ·  Problem: Debility.  Recommendation: Partner Nathan is willing to take the patient home with hospice Problem/Recommendation - 1:  ·  Problem: Pain.  The patient's pain has been well-controlled with one breakthrough dose of Dilaudid needed overnight; 24 hour total is 2 mg  Recommendation: continue Dilaudid 2 mg IVP q 1H and 2 mg IVP q 1H PRN  The patient will be transitioned to PO medication prior to discharge home with hospice.     Problem/Recommendation - 2:  ·	Problem: Sigmoid diverticulitis.  Patient refusing surgery  Deemed to have capacity per Psych.     Problem/Recommendation - 3:  ·  Problem: Hemorrhagic shock.  Recommendation: Required transfusions from low H/H  Chronic left leg ulcer.      Problem/Recommendation - 4:  ·  Problem: Debility.  Recommendation: Partner Nathan is willing to take the patient home with hospice  PPS Level 30% Problem/Recommendation - 1:  ·  Problem: Pain.  The patient's pain has been well-controlled with 2 breakthrough doses of Dilaudid needed overnight; 24 hour total is 4 mg  Recommendation: continue Dilaudid 2 mg IVP q 1H PRN  The patient will be transitioned to PO medication prior to discharge home with hospice.     Problem/Recommendation - 2:  ·	Problem: Sigmoid diverticulitis.  Patient refusing surgery  Deemed to have capacity per Psych.     Problem/Recommendation - 3:  ·  Problem: Hemorrhagic shock.  Recommendation: Required transfusions from low H/H  Chronic left leg ulcer.      Problem/Recommendation - 4:  ·  Problem: Debility.  Recommendation: Partner Nathan is willing to take the patient home with hospice  PPS Level 30%

## 2018-10-16 NOTE — PROGRESS NOTE ADULT - ASSESSMENT
Patient is a 67 y/o female with chronic left leg ulcer with significant hx of polymyositis a/w worsening weakness, excessive bleeding from leg ulcer found to have low h/h and perforated sigmoid diverticuli.  Patient refused ex lap surgery. Psychiatry deemed patient to have capacity. Patient wanted comfort care only at this time. She was transitioned to the palliative care unit.

## 2018-10-16 NOTE — MEDICAL STUDENT PROGRESS NOTE(EDUCATION) - NS MD HP STUD ASPLAN ASSES FT
ANITA is a 67 yo woman with a history of polymyositis, chronic left foot ulcer, and a hematoma on her left calf, who presented to the ED 1 week ago with progressive left leg weakness since a fall 2 weeks prior, whose hospital course has been complicated by sepsis, bleeding, MAI, and perforated sigmoid diverticulitis, now in the PCU for comfort care after refusing surgical repair of her colon. The patient's pain has been well-controlled with only one breakthrough dose of Dilaudid needed overnight. If the patient's pain remains controlled following transition to PO pain medication, she can be discharged home with hospice. ANITA is a 67 yo woman with a history of polymyositis, chronic left foot ulcer, and a hematoma on her left calf, who presented to the ED 1 week ago with progressive left leg weakness since a fall 2 weeks prior, whose hospital course has been complicated by sepsis, bleeding, MAI, and perforated sigmoid diverticulitis. The patient has refused surgical repair of her colon and is now experiencing abdominal pain, likely due to bowel perforation, and lower limb pain, likely due to cellulitis and ulceration of the LLE. She is now in the PCU for symptom-directed care.

## 2018-10-16 NOTE — PROGRESS NOTE ADULT - ATTENDING COMMENTS
Patient seen and examined and agree with the above documentation with the following additions:  Complaints of pain in abdomen and leg; discussed with RN staff and patient about premedication prior to repositioning/care.   Limited PRN needs over last 24 hours; if remains stable, will consider transition to PO and [facility] referral to consider home with hospice.  rest of plan as documented above.

## 2018-10-16 NOTE — PROGRESS NOTE ADULT - PROBLEM SELECTOR PLAN 4
Abdominal pain and left LE ulcer pain peaking Abdominal pain and left LE ulcer pain peaking  C/w Dilaudid 2mg Q1H PRN IVP

## 2018-10-17 ENCOUNTER — MESSAGE (OUTPATIENT)
Age: 68
End: 2018-10-17

## 2018-10-17 LAB — ANA TITR SER: NEGATIVE — SIGNIFICANT CHANGE UP

## 2018-10-17 PROCEDURE — 99233 SBSQ HOSP IP/OBS HIGH 50: CPT | Mod: GC

## 2018-10-17 RX ORDER — HYDROMORPHONE HYDROCHLORIDE 2 MG/ML
8 INJECTION INTRAMUSCULAR; INTRAVENOUS; SUBCUTANEOUS
Qty: 0 | Refills: 0 | Status: DISCONTINUED | OUTPATIENT
Start: 2018-10-17 | End: 2018-10-17

## 2018-10-17 RX ORDER — HALOPERIDOL DECANOATE 100 MG/ML
0.5 INJECTION INTRAMUSCULAR
Qty: 0 | Refills: 0 | Status: DISCONTINUED | OUTPATIENT
Start: 2018-10-17 | End: 2018-10-20

## 2018-10-17 RX ORDER — ONDANSETRON 8 MG/1
4 TABLET, FILM COATED ORAL EVERY 6 HOURS
Qty: 0 | Refills: 0 | Status: DISCONTINUED | OUTPATIENT
Start: 2018-10-17 | End: 2018-10-20

## 2018-10-17 RX ORDER — HYDROMORPHONE HYDROCHLORIDE 2 MG/ML
2 INJECTION INTRAMUSCULAR; INTRAVENOUS; SUBCUTANEOUS
Qty: 0 | Refills: 0 | Status: DISCONTINUED | OUTPATIENT
Start: 2018-10-17 | End: 2018-10-19

## 2018-10-17 RX ORDER — ACETAMINOPHEN 500 MG
650 TABLET ORAL EVERY 6 HOURS
Qty: 0 | Refills: 0 | Status: DISCONTINUED | OUTPATIENT
Start: 2018-10-17 | End: 2018-10-20

## 2018-10-17 RX ORDER — ONDANSETRON 8 MG/1
4 TABLET, FILM COATED ORAL EVERY 6 HOURS
Qty: 0 | Refills: 0 | Status: DISCONTINUED | OUTPATIENT
Start: 2018-10-17 | End: 2018-10-17

## 2018-10-17 RX ORDER — ROBINUL 0.2 MG/ML
0.4 INJECTION INTRAMUSCULAR; INTRAVENOUS EVERY 6 HOURS
Qty: 0 | Refills: 0 | Status: DISCONTINUED | OUTPATIENT
Start: 2018-10-17 | End: 2018-10-20

## 2018-10-17 RX ADMIN — HYDROMORPHONE HYDROCHLORIDE 8 MILLIGRAM(S): 2 INJECTION INTRAMUSCULAR; INTRAVENOUS; SUBCUTANEOUS at 17:31

## 2018-10-17 RX ADMIN — HYDROMORPHONE HYDROCHLORIDE 2 MILLIGRAM(S): 2 INJECTION INTRAMUSCULAR; INTRAVENOUS; SUBCUTANEOUS at 19:40

## 2018-10-17 RX ADMIN — HYDROMORPHONE HYDROCHLORIDE 2 MILLIGRAM(S): 2 INJECTION INTRAMUSCULAR; INTRAVENOUS; SUBCUTANEOUS at 04:21

## 2018-10-17 RX ADMIN — HYDROMORPHONE HYDROCHLORIDE 8 MILLIGRAM(S): 2 INJECTION INTRAMUSCULAR; INTRAVENOUS; SUBCUTANEOUS at 14:18

## 2018-10-17 RX ADMIN — HYDROMORPHONE HYDROCHLORIDE 8 MILLIGRAM(S): 2 INJECTION INTRAMUSCULAR; INTRAVENOUS; SUBCUTANEOUS at 18:01

## 2018-10-17 RX ADMIN — Medication 50 MILLIGRAM(S): at 17:23

## 2018-10-17 RX ADMIN — ROBINUL 0.4 MILLIGRAM(S): 0.2 INJECTION INTRAMUSCULAR; INTRAVENOUS at 23:17

## 2018-10-17 RX ADMIN — HYDROMORPHONE HYDROCHLORIDE 2 MILLIGRAM(S): 2 INJECTION INTRAMUSCULAR; INTRAVENOUS; SUBCUTANEOUS at 04:35

## 2018-10-17 RX ADMIN — ONDANSETRON 4 MILLIGRAM(S): 8 TABLET, FILM COATED ORAL at 17:32

## 2018-10-17 RX ADMIN — Medication 50 MILLIGRAM(S): at 05:47

## 2018-10-17 RX ADMIN — ONDANSETRON 4 MILLIGRAM(S): 8 TABLET, FILM COATED ORAL at 23:17

## 2018-10-17 RX ADMIN — Medication 0.5 MILLIGRAM(S): at 18:32

## 2018-10-17 RX ADMIN — ROBINUL 0.4 MILLIGRAM(S): 0.2 INJECTION INTRAMUSCULAR; INTRAVENOUS at 20:31

## 2018-10-17 NOTE — MEDICAL STUDENT PROGRESS NOTE(EDUCATION) - SUBJECTIVE AND OBJECTIVE BOX
Subjective    HPI: ANITA is a 69 yo woman with a history of polymyositis, a chronic left foot ulcer, and a hematoma on her left calf, which she developed 3 weeks ago after a fall, who presented to the ED 1 week ago with progressive left leg weakness since her fall 2 weeks prior. In the ED, she was treated with Abx for severe sepsis secondary to LLE cellulitis, resulting from her recent fall. Podiatry performed a punch biopsy of both LLE wounds, after which an RRT was called due to bleeding and hypotension. CXR during the RRT showed a pneumoperitoneum, which prompted a CT abdomen. The CT showed a likely perforated diverticulitis.  Surgery recommended an ex lap, with likely bowel resection and ostomy, but the patient refused and was deemed to have capacity to make this refusal by psychiatry. The patient was then transferred to the PCU for comfort care and made DNR/DNI.     The patient had no acute events overnight. She received 2 breakthrough doses of Dilaudid PRN for pain and 1 dose of Zofran PRN for nausea due to an episode of vomiting yesterday afternoon. She states that her pain at rest has been controlled, though the pain is exacerbated when she needs to be moved. Her last bowel movement was this morning.      Objective    Vitals: Temp: 87.7 F axillary, HR: 92, BP: 129/88, RR: 18, O2 Sat: 100% on RA    Physical exam:  General: Pt is lying in bed in NAD  Lungs: clear to auscultation bilaterally; no rales, rhonchi, wheezes  Cardiac: normal S1, S2; normal rate and rhythm  Abd: soft, diffusely tender, mildly distended  Neuro: AAO x 3; strength and general sensation grossly in tact  Skin: no rashes; + swelling in the LLE    No new labs    PPS Level 30% Subjective    HPI: ANITA is a 67 yo woman with a history of polymyositis, a chronic left foot ulcer, and a hematoma on her left calf, which she developed 3 weeks ago after a fall, who presented to the ED 1 week ago with progressive left leg weakness since her fall 2 weeks prior. In the ED, she was treated with Abx for severe sepsis secondary to LLE cellulitis, resulting from her recent fall. Podiatry performed a punch biopsy of both LLE wounds, after which an RRT was called due to bleeding and hypotension. CXR during the RRT showed a pneumoperitoneum, which prompted a CT abdomen. The CT showed a likely perforated diverticulitis.  Surgery recommended an ex lap, with likely bowel resection and ostomy, but the patient refused and was deemed to have capacity to make this refusal by psychiatry. The patient was then transferred to the PCU for comfort care and made DNR/DNI.     The patient had no acute events overnight. She received 4 breakthrough doses of Dilaudid PRN for pain since yesterday.      Objective    Vitals: Temp: 97.9 F axillary, HR: 93, BP: 129/71, RR: 18, O2 Sat: 97% on RA    Physical exam:  General: Pt is lying in bed in NAD  Lungs: clear to auscultation bilaterally; no rales, rhonchi, wheezes  Cardiac: normal S1, S2; normal rate and rhythm  Abd: soft, diffusely tender, mildly distended  Neuro: AAO x 3; strength and general sensation grossly in tact  Skin: no rashes; + swelling in the LLE    No new labs    PPS Level 30% Subjective    HPI: ANITA is a 69 yo woman with a history of polymyositis, a chronic left foot ulcer, and a hematoma on her left calf, which she developed 3 weeks ago after a fall, who presented to the ED 1 week ago with progressive left leg weakness since her fall 2 weeks prior. In the ED, she was treated with Abx for severe sepsis secondary to LLE cellulitis, resulting from her recent fall. Podiatry performed a punch biopsy of both LLE wounds, after which an RRT was called due to bleeding and hypotension. CXR during the RRT showed a pneumoperitoneum, which prompted a CT abdomen. The CT showed a likely perforated diverticulitis.  Surgery recommended an ex lap, with likely bowel resection and ostomy, but the patient refused and was deemed to have capacity to make this refusal by psychiatry. The patient was then transferred to the PCU for comfort care and made DNR/DNI.     The patient had no acute events overnight. She received 4 breakthrough doses of Dilaudid PRN in the last 24 hours for pain due to positional changes. She feels that the PRN's have adequately controlled her pain both in her abdomen and in her LLE. Her LBM was this morning.      Objective    Vitals: Temp: 97.9 F axillary, HR: 93, BP: 129/71, RR: 18, O2 Sat: 97% on RA    Physical exam:  General: Pt is lying in bed in NAD  Lungs: clear to auscultation bilaterally; no rales, rhonchi, wheezes  Cardiac: normal S1, S2; normal rate and rhythm  Abd: soft, diffusely tender, mildly distended  Neuro: AAO x 3; strength and general sensation grossly in tact  Skin: no rashes; + swelling in the LLE    No new labs or imaging    PPS Level 30% Subjective    HPI: ANITA is a 69 yo woman with a history of polymyositis, a chronic left foot ulcer, and a hematoma on her left calf, which she developed 3 weeks ago after a fall, who presented to the ED 1 week ago with progressive left leg weakness since her fall 2 weeks prior. In the ED, she was treated with Abx for severe sepsis secondary to LLE cellulitis, resulting from her recent fall. Podiatry performed a punch biopsy of both LLE wounds, after which an RRT was called due to bleeding and hypotension. CXR during the RRT showed a pneumoperitoneum, which prompted a CT abdomen. The CT showed a likely perforated diverticulitis.  Surgery recommended an ex-lap, with likely bowel resection and ostomy, but the patient refused and was deemed to have capacity to make this refusal by psychiatry. The patient was then transferred to the PCU for comfort care and made DNR/DNI.     The patient had no acute events overnight. She was pre-treated with Dilaudid PRN 4 times in the last 24 hours to relieve pain exacerbations associated with positional changes. She feels that the PRN's have adequately controlled her pain both in her abdomen and in her LLE, though she wishes to avoid being moved whenever possible. Her LBM was this morning. The patient denies any nausea or vomiting since yesterday.      Objective    Vitals: Temp: 97.9 F axillary, HR: 93, BP: 129/71, RR: 18, O2 Sat: 97% on RA    Physical exam:  General: Pt is lying in bed in NAD  Lungs: clear to auscultation bilaterally; no rales, rhonchi, wheezes  Cardiac: normal S1, S2; normal rate and rhythm  Abd: soft, diffusely tender, mildly distended  Neuro: AAO x 3; strength and general sensation grossly in tact  Skin: no rashes; + swelling in the LLE    No new labs or imaging    PPS Level 30%

## 2018-10-17 NOTE — PROGRESS NOTE ADULT - PROBLEM SELECTOR PLAN 4
Abdominal pain and left LE ulcer pain peaking  C/w Dilaudid 2mg Q1H PRN IVP  Will transition to Dilaudid 10 mg po equivalent  Would give 30 min prior to positioning/changing/bathing Abdominal pain and left LE ulcer pain peaking  C/w Dilaudid 2mg Q1H PRN IVP  Will transition to Dilaudid 8 mg po equivalent  Would give 30 min prior to positioning/changing/bathing

## 2018-10-17 NOTE — PROGRESS NOTE ADULT - ATTENDING COMMENTS
Patient seen and examined and agree with the above documentation with the following additions:   will transition to PO given only PRN needs are prior to repositioning. HCN referral today to see if possibility to transition home.  Will contact podiatry to help with wound dressing recommendations. N/V controlled at present. Tolerating diet, though minimal. Rest of management as documented above.

## 2018-10-17 NOTE — MEDICAL STUDENT PROGRESS NOTE(EDUCATION) - NS MD HP STUD ASPLAN PLAN FT
Problem/Recommendation - 1:  ·  Problem: Pain.  The patient's pain has been well-controlled with 2 breakthrough doses of Dilaudid needed overnight; 24 hour total is 4 mg  Recommendation: continue Dilaudid 2 mg IVP q 1H PRN  The patient will be transitioned to PO medication prior to discharge home with hospice.     Problem/Recommendation - 2:  ·	Problem: Sigmoid diverticulitis.  Patient refusing surgery  Deemed to have capacity per Psych.     Problem/Recommendation - 3:  ·  Problem: Hemorrhagic shock.  Recommendation: Required transfusions from low H/H  Chronic left leg ulcer.      Problem/Recommendation - 4:  ·  Problem: Debility.  Recommendation: Partner Nathan is willing to take the patient home with hospice  PPS Level 30% Problem/Recommendation - 1:  ·  Problem: Pain.  The patient received 4 breakthrough doses of Dilaudid overnight; 24 hour total is 8 mg  Recommendation: continue Dilaudid 2 mg IVP q 1H PRN  The patient will be transitioned to PO medication prior to discharge home with hospice.     Problem/Recommendation - 2:  ·	Problem: Sigmoid diverticulitis.  Patient refusing surgery  Deemed to have capacity per Psych.     Problem/Recommendation - 3:  ·  Problem: Hemorrhagic shock.  Recommendation: Required transfusions from low H/H  Chronic left leg ulcer.      Problem/Recommendation - 4:  ·  Problem: Debility.  Recommendation: Partner Nathan is willing to take the patient home with hospice  PPS Level 30% Problem/Recommendation - 1:  ·	Problem: Pain.  The patient received 4 breakthrough doses of Dilaudid overnight as pretreatment for positional changes; 24 hour total is 8 mg  Recommendation: continue Dilaudid 2 mg IVP q 1H PRN  The patient will be transitioned to PO medication prior to discharge home with hospice.     Problem/Recommendation - 2:  ·	Problem: Sigmoid diverticulitis.  Patient refusing surgery  Deemed to have capacity per Psych.     Problem/Recommendation - 3:  ·	Problem: Hemorrhagic shock.  Recommendation: Required transfusions from low H/H  Chronic left leg ulcer.      Problem/Recommendation - 4:  ·	Problem: Debility.  Recommendation: Partner Nathan is willing to take the patient home with hospice  PPS Level 30% Problem/Recommendation - 1:  ·	Problem: Pain.  The patient received 4 breakthrough doses of Dilaudid overnight as pretreatment for positional changes; 24 hour total is 8 mg  Recommendation: continue Dilaudid 2 mg IVP q 1H PRN  The patient will be transitioned to PO medication prior to discharge. Hospice evaluation pending.     Problem/Recommendation - 2:  ·	Problem: Sigmoid diverticulitis.  Patient refusing surgery.  Deemed to have capacity per Psych.  Currently tolerating PO intake.     Problem/Recommendation - 3:  ·	Problem: Hemorrhagic shock.  Recommendation: Required transfusions from low H/H due to chronic left leg ulcer.      Problem/Recommendation - 4:  ·	Problem: Left foot ulcer.  Recommendation: Dressing to be changed today.       Problem/Recommendation - 5:  ·	Problem: Debility.  Recommendation: Partner Nathan is willing to take the patient home with hospice.  PPS Level 30%     Problem/Recommendation - 6:  ·	Problem: Nausea. Recommendation: Had one episode of vomiting on 10/5; Currently resolved but has an order for Zofran PRN.

## 2018-10-17 NOTE — PROGRESS NOTE ADULT - SUBJECTIVE AND OBJECTIVE BOX
[facility] TEAM PALLIATIVE CARE UNIT PROGRESS NOTE:      [  ] Patient on hospice program.    INDICATION FOR PALLIATIVE CARE UNIT SERVICES:  Comfort care and symptom management     INTERVAL HPI/OVERNIGHT EVENTS:  4 BT Dilaudid for pain (mainly positional and multiple points of pain)    DNR on chart: Yes  Allergies  penicillins (Unknown)    Intolerances    MEDICATIONS  (STANDING):  hydrocortisone sodium succinate Injectable 50 milliGRAM(s) IV Push every 12 hours    MEDICATIONS  (PRN):  acetaminophen  Suppository .. 650 milliGRAM(s) Rectal every 6 hours PRN Temp greater or equal to 38C (100.4F)  bisacodyl Suppository 10 milliGRAM(s) Rectal daily PRN Constipation  HYDROmorphone  Injectable 2 milliGRAM(s) IV Push every 1 hour PRN dyspnea  HYDROmorphone  Injectable 2 milliGRAM(s) IV Push every 1 hour PRN pain  LORazepam   Injectable 0.5 milliGRAM(s) IV Push every 2 hours PRN anxiety/agitation  ondansetron Injectable 4 milliGRAM(s) IV Push every 6 hours PRN Nausea and/or Vomiting    ITEMS UNCHECKED ARE NOT PRESENT    PRESENT SYMPTOMS: [ ]Unable to obtain due to poor mentation     Source if other than patient:  [ ]Family   [ ]Team     Pain (Impact on QOL):  debilitating   Location:     leg ulcer, abdominal pain   Minimal acceptable level (0-10 scale): 3/10  Aggravating factors: positioning   Quality:  Radiation:  Severity (0-10 scale): 10/10    Dyspnea:                           [ ]Mild [ ]Moderate [ ]Severe  Anxiety:                             [ ]Mild [ ]Moderate [ ]Severe  Fatigue:                             [ ]Mild [ ]Moderate [ ]Severe  Nausea:                             [ ]Mild [ ]Moderate [ ]Severe  Loss of appetite:                [ ]Mild [ ]Moderate [ ]Severe  Constipation:                     [ ]Mild [ ]Moderate [ ]Severe    PAINAD Score: 	  Other Symptoms:  [ ]All other review of systems negative     Karnofsky Performance Score/Palliative Performance Status Version 2:    30     %          PHYSICAL EXAM:  Vital Signs Last 24 Hrs  T(C): 36.6 (17 Oct 2018 07:53), Max: 36.6 (17 Oct 2018 07:53)  T(F): 97.9 (17 Oct 2018 07:53), Max: 97.9 (17 Oct 2018 07:53)  HR: 93 (17 Oct 2018 07:53) (93 - 93)  BP: 129/71 (17 Oct 2018 07:53) (129/71 - 129/71)  BP(mean): --  RR: 18 (17 Oct 2018 07:53) (18 - 18)  SpO2: 97% (17 Oct 2018 07:53) (97% - 97%) I&O's Summary    GENERAL:  [ x]Alert  [x ]Oriented x 3   [ ]Lethargic  [ ]Cachexia  [ ]Unarousable  [ x]Verbal  [ ]Non-Verbal    Behavioral:   [ ] Anxiety  [ ] Delirium [ ] Agitation [ ] Other    HEENT:  [x ]Normal   [ ]Dry mouth   [ ]ET Tube/Trach  [ ]Oral lesions    PULMONARY:   [x ]Clear [ ]Tachypnea  [ ]Audible excessive secretions   [ ]Rhonchi        [ ]Right [ ]Left [ ]Bilateral  [ ]Crackles        [ ]Right [ ]Left [ ]Bilateral  [ ]Wheezing     [ ]Right [ ]Left [ ]Bilateral    CARDIOVASCULAR:    [x ]Regular [ ]Irregular [ ]Tachy  [ ]Raza [ ]Murmur [ ]Other    GASTROINTESTINAL:  [ ]Soft  [ ]Distended   [ ]+BS  [ ]Non tender [x ]Tender  [ ]PEG [ ]OGT/ NGT   Last BM: 10/16       GENITOURINARY:  [ ]Normal [x ] Incontinent   [ ]Oliguria/Anuria   [ ]Posey    MUSCULOSKELETAL:   [ ]Normal   [ ]Weakness  [x ]Bed/Wheelchair bound [ ]Edema    NEUROLOGIC:   [ x]No focal deficits  [ ] Cognitive impairment  [ ] Dysphagia [ ]Dysarthria [ ] Paresis [ ]Other     SKIN:   [ ]Normal   [ ]Pressure ulcer(s)  [ ]Rash (x) left LE ulcer     CRITICAL CARE:  [ ] Shock Present  [ ]Septic [ ]Cardiogenic [ ]Neurologic [ ]Hypovolemic  [ ]  Vasopressors [ ]  Inotropes   [ ] Respiratory failure present  [ ] Acute  [ ] Chronic [ ] Hypoxic  [ ] Hypercarbic [ ] Other  [ ] Other organ failure     LABS: no new labs for review     RADIOLOGY & ADDITIONAL STUDIES: no new imaging for review     PROTEIN CALORIE MALNUTRITION: [x ] yes   [ ]no  [x ] PPSV2 < or = 30% [ ] significant weight loss [x ] poor nutritional intake [ ] anasarca [ ] catabolic state   Albumin, Serum: 2.1 g/dL (10-12-18 @ 18:01)   Artificial Nutrition [ ]     REFERRALS:   [ ]Chaplaincy  [ ] Hospice  [ ]Child Life  [ x]Social Work  [ ]Case management [ ]Holistic Therapy [ ] Physical Therapy [ ] Dietary   Goals of Care Document: Goals of Care Conversation - Personal Advance Directive [LORNE Christian M. McAfee] (10-12-18 @ 15:31)

## 2018-10-17 NOTE — MEDICAL STUDENT PROGRESS NOTE(EDUCATION) - NS MD HP STUD ASPLAN ASSES FT
ANITA is a 69 yo woman with a history of polymyositis, chronic left foot ulcer, and a hematoma on her left calf, who presented to the ED 1 week ago with progressive left leg weakness since a fall 2 weeks prior, whose hospital course has been complicated by sepsis, bleeding, MAI, and perforated sigmoid diverticulitis. The patient has refused surgical repair of her colon and is now experiencing abdominal pain, likely due to bowel perforation, and lower limb pain, likely due to cellulitis and ulceration of the LLE. She is now in the PCU for symptom-directed care.

## 2018-10-17 NOTE — PROGRESS NOTE ADULT - PROBLEM SELECTOR PLAN 5
Transition IV to PO medication  Requires prn medication for pain management pre positioning since her pain is aggravated with positioning.   Hospice referral for home with hospice   DNR/DNI

## 2018-10-18 ENCOUNTER — TRANSCRIPTION ENCOUNTER (OUTPATIENT)
Age: 68
End: 2018-10-18

## 2018-10-18 PROCEDURE — 99233 SBSQ HOSP IP/OBS HIGH 50: CPT | Mod: GC

## 2018-10-18 RX ORDER — HYDROCORTISONE 20 MG
50 TABLET ORAL
Qty: 0 | Refills: 0 | COMMUNITY
Start: 2018-10-18

## 2018-10-18 RX ORDER — ONDANSETRON 8 MG/1
4 TABLET, FILM COATED ORAL
Qty: 0 | Refills: 0 | COMMUNITY
Start: 2018-10-18

## 2018-10-18 RX ORDER — SCOPALAMINE 1 MG/3D
1 PATCH, EXTENDED RELEASE TRANSDERMAL
Qty: 0 | Refills: 0 | COMMUNITY
Start: 2018-10-18

## 2018-10-18 RX ORDER — ROBINUL 0.2 MG/ML
0.4 INJECTION INTRAMUSCULAR; INTRAVENOUS
Qty: 0 | Refills: 0 | COMMUNITY
Start: 2018-10-18

## 2018-10-18 RX ORDER — METOPROLOL TARTRATE 50 MG
1 TABLET ORAL
Qty: 0 | Refills: 0 | COMMUNITY

## 2018-10-18 RX ORDER — FUROSEMIDE 40 MG
1 TABLET ORAL
Qty: 0 | Refills: 0 | COMMUNITY

## 2018-10-18 RX ORDER — ACETAMINOPHEN 500 MG
1 TABLET ORAL
Qty: 0 | Refills: 0 | COMMUNITY
Start: 2018-10-18

## 2018-10-18 RX ORDER — SCOPALAMINE 1 MG/3D
1 PATCH, EXTENDED RELEASE TRANSDERMAL
Qty: 0 | Refills: 0 | Status: DISCONTINUED | OUTPATIENT
Start: 2018-10-18 | End: 2018-10-20

## 2018-10-18 RX ADMIN — ROBINUL 0.4 MILLIGRAM(S): 0.2 INJECTION INTRAMUSCULAR; INTRAVENOUS at 17:59

## 2018-10-18 RX ADMIN — HYDROMORPHONE HYDROCHLORIDE 2 MILLIGRAM(S): 2 INJECTION INTRAMUSCULAR; INTRAVENOUS; SUBCUTANEOUS at 10:15

## 2018-10-18 RX ADMIN — ONDANSETRON 4 MILLIGRAM(S): 8 TABLET, FILM COATED ORAL at 23:35

## 2018-10-18 RX ADMIN — ROBINUL 0.4 MILLIGRAM(S): 0.2 INJECTION INTRAMUSCULAR; INTRAVENOUS at 12:48

## 2018-10-18 RX ADMIN — ONDANSETRON 4 MILLIGRAM(S): 8 TABLET, FILM COATED ORAL at 05:03

## 2018-10-18 RX ADMIN — HYDROMORPHONE HYDROCHLORIDE 2 MILLIGRAM(S): 2 INJECTION INTRAMUSCULAR; INTRAVENOUS; SUBCUTANEOUS at 21:52

## 2018-10-18 RX ADMIN — ONDANSETRON 4 MILLIGRAM(S): 8 TABLET, FILM COATED ORAL at 17:59

## 2018-10-18 RX ADMIN — ROBINUL 0.4 MILLIGRAM(S): 0.2 INJECTION INTRAMUSCULAR; INTRAVENOUS at 05:03

## 2018-10-18 RX ADMIN — Medication 50 MILLIGRAM(S): at 05:03

## 2018-10-18 RX ADMIN — SCOPALAMINE 1 PATCH: 1 PATCH, EXTENDED RELEASE TRANSDERMAL at 10:12

## 2018-10-18 RX ADMIN — ONDANSETRON 4 MILLIGRAM(S): 8 TABLET, FILM COATED ORAL at 12:48

## 2018-10-18 RX ADMIN — SCOPALAMINE 1 PATCH: 1 PATCH, EXTENDED RELEASE TRANSDERMAL at 19:56

## 2018-10-18 RX ADMIN — ROBINUL 0.4 MILLIGRAM(S): 0.2 INJECTION INTRAMUSCULAR; INTRAVENOUS at 23:35

## 2018-10-18 RX ADMIN — Medication 50 MILLIGRAM(S): at 17:59

## 2018-10-18 RX ADMIN — HYDROMORPHONE HYDROCHLORIDE 2 MILLIGRAM(S): 2 INJECTION INTRAMUSCULAR; INTRAVENOUS; SUBCUTANEOUS at 10:30

## 2018-10-18 NOTE — DISCHARGE NOTE ADULT - HOSPITAL COURSE
Patient is a 67 y/o female with chronic left leg ulcer with significant hx of polymyositis a/w worsening weakness, excessive bleeding from leg ulcer found to have low h/h and perforated sigmoid diverticuli.  Patient refused ex lap surgery. Psychiatry deemed patient to have capacity. Patient wanted comfort care only at this time. She was transitioned to the palliative care unit for continued care and symptom management. Attempted to transition to PO meds.  Pt did not tolerate.  she developed n/v and abd pain.  Pt placed back on IV meds. Pt discharged to the Hospice [facility] on 10/19/18.

## 2018-10-18 NOTE — DISCHARGE NOTE ADULT - PLAN OF CARE
symptom control meds as ordered for symptom control continue with Dilaudid 2mg IV every 6 hours and Dilaudid 2mg Iv q 1 hr as needed for breakthrough pain continue with Zofran as indicated  continue with diet as tolerated

## 2018-10-18 NOTE — PROGRESS NOTE ADULT - ASSESSMENT
Patient is a 69 y/o female with chronic left leg ulcer with significant hx of polymyositis a/w worsening weakness, excessive bleeding from leg ulcer found to have low h/h and perforated sigmoid diverticuli.  Patient refused ex lap surgery. Psychiatry deemed patient to have capacity. Patient wanted comfort care only at this time. She was transitioned to the palliative care unit for continued care and symptom management.

## 2018-10-18 NOTE — DISCHARGE NOTE ADULT - CARE PLAN
Principal Discharge DX:	Pneumoperitoneum  Goal:	symptom control  Assessment and plan of treatment:	meds as ordered for symptom control  Secondary Diagnosis:	Pain  Secondary Diagnosis:	Nausea and vomiting, intractability of vomiting not specified, unspecified vomiting type  Secondary Diagnosis:	Cellulitis of left lower extremity  Secondary Diagnosis:	Polymyositis  Secondary Diagnosis:	Dysphagia, unspecified type  Secondary Diagnosis:	Encounter for palliative care Principal Discharge DX:	Pneumoperitoneum  Goal:	symptom control  Assessment and plan of treatment:	meds as ordered for symptom control  Secondary Diagnosis:	Pain  Assessment and plan of treatment:	continue with Dilaudid 2mg IV every 6 hours and Dilaudid 2mg Iv q 1 hr as needed for breakthrough pain  Secondary Diagnosis:	Nausea and vomiting, intractability of vomiting not specified, unspecified vomiting type  Assessment and plan of treatment:	continue with Zofran as indicated  continue with diet as tolerated  Secondary Diagnosis:	Cellulitis of left lower extremity  Secondary Diagnosis:	Polymyositis  Secondary Diagnosis:	Dysphagia, unspecified type  Secondary Diagnosis:	Encounter for palliative care

## 2018-10-18 NOTE — DISCHARGE NOTE ADULT - SECONDARY DIAGNOSIS.
Pain Nausea and vomiting, intractability of vomiting not specified, unspecified vomiting type Cellulitis of left lower extremity Polymyositis Dysphagia, unspecified type Encounter for palliative care

## 2018-10-18 NOTE — PROGRESS NOTE ADULT - ATTENDING COMMENTS
Patient seen and examined and agree with the above documentation with the following additions:   Difficult night secondary to N/V and abdominal pain after trial of PO med transition. Returned back to IV route of medication. [facility] following, patient appears to be inpatient appropriate at this time. Await HCN meeting with friend Nathan ireland. Possible transfer to inpatient hospice on 10/19. Rest of plan as documented above.

## 2018-10-18 NOTE — PROGRESS NOTE ADULT - PROBLEM SELECTOR PLAN 5
Transition back to IV meds - pt did not tolerate po meds  Requires prn medication for pain management pre positioning since her pain is aggravated with positioning.   Hospice referral for in pt  DNR/DNI

## 2018-10-18 NOTE — MEDICAL STUDENT PROGRESS NOTE(EDUCATION) - NS MD HP STUD ASPLAN PLAN FT
Problem/Recommendation - 1:  ·	Problem: Pain.  The patient received 4 breakthrough doses of Dilaudid overnight as pretreatment for positional changes; 24 hour total is 8 mg  Recommendation: continue Dilaudid 2 mg IVP q 1H PRN  The patient will be transitioned to PO medication prior to discharge. Hospice evaluation pending.     Problem/Recommendation - 2:  ·	Problem: Sigmoid diverticulitis.  Patient refusing surgery.  Deemed to have capacity per Psych.  Currently tolerating PO intake.     Problem/Recommendation - 3:  ·	Problem: Hemorrhagic shock.  Recommendation: Required transfusions from low H/H due to chronic left leg ulcer.      Problem/Recommendation - 4:  ·	Problem: Left foot ulcer.  Recommendation: Dressing to be changed today.       Problem/Recommendation - 5:  ·	Problem: Debility.  Recommendation: Partner Nathan is willing to take the patient home with hospice.  PPS Level 30%     Problem/Recommendation - 6:  Problem: Nausea. Recommendation: Had one episode of vomiting on 10/5; Currently resolved but has an order for Zofran PRN. Problem/Recommendation - 1:  ·	Problem: Pain.  The patient received 3 breakthrough doses of Dilaudid overnight as pretreatment for positional changes, but experienced nausea/vomiting shortly after ingestion; 24 hour total is 18 mg  Recommendation: transition Dilaudid 6 mg PO PRN back to 2 mg IVP q 1H PRN  Hospice evaluation pending.     Problem/Recommendation - 2:  ·	Problem: Nausea/Vomiting. Recommendation: Had 5 episodes of vomiting yesterday; Zofran PRN.     Problem/Recommendation - 3:  ·	Problem: Sigmoid diverticulitis.  Patient refusing surgery.  Deemed to have capacity per Psych.  No longer tolerating PO intake due to nausea/vomiting.     Problem/Recommendation - 4:  ·	Problem: Hemorrhagic shock.  Recommendation: Required transfusions from low H/H due to chronic left leg ulcer.      Problem/Recommendation - 5:  ·	Problem: Left foot ulcer.  Recommendation: Dressing to be changed today.       Problem/Recommendation - 6:  ·	Problem: Debility.  Recommendation: Partner Nathan is willing to take the patient home with hospice.  PPS Level 30% Problem/Recommendation - 1:  ·	Problem: Pain.  The patient received 3 breakthrough doses of Dilaudid overnight as pretreatment for positional changes, but experienced nausea/vomiting shortly after ingestion; 24 hour total is 24 mg  Recommendation: transition Dilaudid 8 mg PO q2h PRN back to 2 mg IVP q1H PRN  Hospice evaluation pending. If the patient's symptoms can be stabilized on IV medication, she can be transferred to an inpatient hospice facility.     Problem/Recommendation - 2:  ·	Problem: Nausea/Vomiting. Recommendation: Had 5 episodes of vomiting yesterday; Zofran 4 mg IVP q6h; Ativan 0.5 mg IVP q2h PRN     Problem/Recommendation - 3:  ·	Problem: Sigmoid diverticulitis.  Patient refusing surgery.  Deemed to have capacity per Psych.  No longer tolerating PO intake due to nausea/vomiting.     Problem/Recommendation - 4:  ·	Problem: Hemorrhagic shock.  Recommendation: Required transfusions from low H/H due to chronic left leg ulcer.      Problem/Recommendation - 5:  ·	Problem: Left foot ulcer.  Recommendation: Dressing to be changed today.       Problem/Recommendation - 6:  ·	Problem: Debility.  Recommendation: Partner Nathan is willing to take the patient home with hospice.  PPS Level 30% Problem/Recommendation - 1:  ·	Problem: Pain.  The patient received 3 breakthrough doses of Dilaudid overnight as pretreatment for positional changes, but experienced nausea/vomiting shortly after ingestion; 24 hour total is 24 mg  Recommendation: transition Dilaudid 8 mg PO q2h PRN back to 2 mg IVP q1H PRN  Hospice evaluation pending. Due to the patient's need for IV medication, inpatient hospice is appropriate.     Problem/Recommendation - 2:  ·	Problem: Nausea/Vomiting. Recommendation: Had 5 episodes of vomiting yesterday; Zofran 4 mg IVP q6h; Ativan 0.5 mg IVP q2h PRN     Problem/Recommendation - 3:  ·	Problem: Sigmoid diverticulitis.  Patient refusing surgery.  Deemed to have capacity per Psych.  No longer tolerating PO intake due to nausea/vomiting.     Problem/Recommendation - 4:  ·	Problem: Hemorrhagic shock.  Recommendation: Required transfusions from low H/H due to chronic left leg ulcer.      Problem/Recommendation - 5:  ·	Problem: Left foot ulcer.  Recommendation: Dressing to be changed today.       Problem/Recommendation - 6:  ·	Problem: Debility.  Recommendation: Inpatient hospice  PPS Level 30% Problem/Recommendation - 1:  ·	Problem: Pain.  The patient received 3 breakthrough doses of Dilaudid overnight as pretreatment for positional changes, but experienced nausea/vomiting shortly after ingestion; 24 hour total is 24 mg  Recommendation: transition Dilaudid 8 mg PO q2h PRN back to 2 mg IVP q1H PRN  Hospice evaluation pending. Due to the patient's need for IV medication, inpatient hospice is appropriate.     Problem/Recommendation - 2:  ·	Problem: Nausea/Vomiting. Recommendation: Had 5 episodes of vomiting yesterday; Zofran 4 mg IVP q6h; Ativan 0.5 mg IVP q2h PRN     Problem/Recommendation - 3:  ·	Problem: Sigmoid diverticulitis.  Patient refusing surgery.  Deemed to have capacity per Psych.  No longer tolerating PO intake due to nausea/vomiting.     Problem/Recommendation - 4:  ·	Problem: Hemorrhagic shock.  Recommendation: Required transfusions from low H/H due to chronic left leg ulcer.      Problem/Recommendation - 5:  ·	Problem: Left foot ulcer.  Recommendation: Podiatry recommending dressing changes daily       Problem/Recommendation - 6:  ·	Problem: Debility.  Recommendation: Inpatient hospice  PPS Level 30%

## 2018-10-18 NOTE — MEDICAL STUDENT PROGRESS NOTE(EDUCATION) - SUBJECTIVE AND OBJECTIVE BOX
Subjective    HPI: ANITA is a 69 yo woman with a history of polymyositis, a chronic left foot ulcer, and a hematoma on her left calf, which she developed 3 weeks ago after a fall, who presented to the ED 1 week ago with progressive left leg weakness since her fall 2 weeks prior. In the ED, she was treated with Abx for severe sepsis secondary to LLE cellulitis, resulting from her recent fall. Podiatry performed a punch biopsy of both LLE wounds, after which an RRT was called due to bleeding and hypotension. CXR during the RRT showed a pneumoperitoneum, which prompted a CT abdomen. The CT showed a likely perforated diverticulitis.  Surgery recommended an ex-lap, with likely bowel resection and ostomy, but the patient refused and was deemed to have capacity to make this refusal by psychiatry. The patient was then transferred to the PCU for comfort care and made DNR/DNI.     The patient vomited 5 times overnight. She was transitioned to Dilauded PO yesterday and subsequently pre-treated 3 times in the last 24 hours to relieve pain exacerbations associated with positional changes, but reports experiencing nausea due to the oral medication. Her LBM was yesterday morning. The patient reports that the pain in her leg is controlled if she remains still but is still experiencing pain in her abdomen at rest      Objective    Vitals: Temp: 97.9 F axillary, HR: 93, BP: 129/71, RR: 18, O2 Sat: % on RA    Physical exam:  General: Pt is lying in bed in NAD  Lungs: clear to auscultation bilaterally; no rales, rhonchi, wheezes  Cardiac: normal S1, S2; normal rate and rhythm  Abd: soft, diffusely tender, mildly distended  Neuro: AAO x 3; strength and general sensation grossly in tact  Skin: no rashes; + swelling in the LLE    No new labs or imaging    PPS Level 30% Subjective    HPI: ANITA is a 67 yo woman with a history of polymyositis, a chronic left foot ulcer, and a hematoma on her left calf, which she developed 3 weeks ago after a fall, who presented to the ED 1 week ago with progressive left leg weakness since her fall 2 weeks prior. In the ED, she was treated with Abx for severe sepsis secondary to LLE cellulitis, resulting from her recent fall. Podiatry performed a punch biopsy of both LLE wounds, after which an RRT was called due to bleeding and hypotension. CXR during the RRT showed a pneumoperitoneum, which prompted a CT abdomen. The CT showed a likely perforated diverticulitis.  Surgery recommended an ex-lap, with likely bowel resection and ostomy, but the patient refused and was deemed to have capacity to make this refusal by psychiatry. The patient was then transferred to the PCU for comfort care and made DNR/DNI.     The patient vomited 5 times overnight. She was transitioned to Dilauded PO yesterday and subsequently pre-treated 3 times in the last 24 hours to relieve pain exacerbations associated with positional changes. She reports experiencing nausea shortly after taking the oral medication. Her LBM was yesterday morning. The patient reports that the pain in her leg is controlled if she remains still but is still experiencing pain in her abdomen at rest.      Objective    Vitals: Temp: 97.9 F axillary, HR: 93, BP: 129/71, RR: 18, O2 Sat: % on RA    Physical exam:  General: Pt is lying in bed in NAD  Lungs: clear to auscultation bilaterally; no rales, rhonchi, wheezes  Cardiac: normal S1, S2; normal rate and rhythm  Abd: soft, diffusely tender, mildly distended  Neuro: AAO x 3; strength and general sensation grossly in tact  Skin: no rashes; + swelling in the LLE    No new labs or imaging    PPS Level 30% Subjective    HPI: ANITA is a 69 yo woman with a history of polymyositis, a chronic left foot ulcer, and a hematoma on her left calf, which she developed 3 weeks ago after a fall, who presented to the ED 1 week ago with progressive left leg weakness since her fall 2 weeks prior. In the ED, she was treated with Abx for severe sepsis secondary to LLE cellulitis, resulting from her recent fall. Podiatry performed a punch biopsy of both LLE wounds, after which an RRT was called due to bleeding and hypotension. CXR during the RRT showed a pneumoperitoneum, which prompted a CT abdomen. The CT showed a likely perforated diverticulitis.  Surgery recommended an ex-lap, with likely bowel resection and ostomy, but the patient refused and was deemed to have capacity to make this refusal by psychiatry. The patient was then transferred to the PCU for comfort care and made DNR/DNI.     The patient vomited 5 times overnight. She was transitioned to Dilauded PO yesterday and subsequently pre-treated 3 times in the last 24 hours to relieve pain exacerbations associated with positional changes. She reports experiencing nausea shortly after ingestion of the oral medication. She required 3 breakthrough doses of Zofran and 1 breakthrough dose of Ativan due to nausea/vomiting. Her LBM was yesterday morning. The patient reports that the pain in her leg is controlled if she remains still but is still experiencing pain in her abdomen at rest.      Objective    Vitals: Temp: 98.2 F axillary, HR: 108, BP: 134/81, RR: 18, O2 Sat: 92% on RA    Physical exam:  General: Pt is lying in bed in NAD  Lungs: clear to auscultation bilaterally; no rales, rhonchi, wheezes  Cardiac: normal S1, S2; normal rate and rhythm  Abd: soft, diffusely tender, mildly distended  Neuro: AAO x 3; strength and general sensation grossly in tact  Skin: no rashes; + swelling in the LLE    No new labs or imaging    PPS Level 30% Subjective    HPI: ANITA is a 69 yo woman with a history of polymyositis, a chronic left foot ulcer, and a hematoma on her left calf, which she developed 3 weeks ago after a fall, who presented to the ED 1 week ago with progressive left leg weakness since her fall 2 weeks prior. In the ED, she was treated with Abx for severe sepsis secondary to LLE cellulitis, resulting from her recent fall. Podiatry performed a punch biopsy of both LLE wounds, after which an RRT was called due to bleeding and hypotension. CXR during the RRT showed a pneumoperitoneum, which prompted a CT abdomen. The CT showed a likely perforated diverticulitis.  Surgery recommended an ex-lap, with likely bowel resection and ostomy, but the patient refused and was deemed to have capacity to make this refusal by psychiatry. The patient was then transferred to the PCU for comfort care and made DNR/DNI.     The patient vomited 5 times overnight. She was transitioned to Dilauded PO yesterday and subsequently pre-treated 3 times in the last 24 hours to relieve pain exacerbations associated with positional changes. She reports experiencing nausea shortly after ingestion of the oral medication. She required 3 breakthrough doses of Zofran and 1 breakthrough dose of Ativan due to nausea/vomiting. Her LBM was yesterday morning. The patient reports that the pain in her leg is controlled if she remains still but is still experiencing pain in her abdomen at rest.      Objective    Vitals: Temp: 98.2 F axillary, HR: 108, BP: 134/81, RR: 18, O2 Sat: 92% on RA    Physical exam:  General: Pt is lying in bed in NAD  Lungs: clear to auscultation bilaterally; no rales, rhonchi, wheezes  Cardiac: normal S1, S2; tachycardic, normal rhythm  Abd: soft, diffusely tender, mildly distended  Neuro: AAO x 3; strength and general sensation grossly in tact  Skin: no rashes; + swelling in the LLE    No new labs or imaging    PPS Level 30%

## 2018-10-18 NOTE — DISCHARGE NOTE ADULT - MEDICATION SUMMARY - MEDICATIONS TO TAKE
I will START or STAY ON the medications listed below when I get home from the hospital:    hydrocortisone  -- 50 milligram(s) intravenous every 12 hours  -- Indication: For Adrenal insufficiency    HYDROmorphone 100 mg/50 mL-D5% intravenous solution  -- 1 milliliter(s) intravenous every 6 hours  -- Indication: For Pneumoperitoneum from perforated diverticuli    HYDROmorphone 100 mg/50 mL-D5% intravenous solution  -- 2 milliliter(s) intravenous every hour, As needed, pain  -- Indication: For Pneumoperitoneum from perforated diverticuli    acetaminophen 650 mg rectal suppository  -- 1 suppository(ies) rectally every 6 hours, As needed, Temp greater or equal to 38C (100.4F)  -- Indication: For fever    ondansetron 2 mg/mL injectable solution  -- 4 milligram(s) injectable every 6 hours, As Needed nausea and or vomiting  -- Indication: For Nausea & vomiting    scopolamine 1.5 mg transdermal film, extended release  -- 1 patch by transdermal patch every 3 days  -- Indication: For upper air way secretions    haloperidol  -- 0.5 milligram(s) intravenous every 2 hours, As Needed for agitation  -- Indication: For Agitation    benzonatate 100 mg oral capsule  -- 1 cap(s) by mouth 3 times a day, As needed, Cough  -- Indication: For Cough    glycopyrrolate 0.2 mg/mL injectable solution  -- 0.4 milligram(s) injectable every 6 hours, As Needed secretions  -- Indication: For upper airway secretions    bisacodyl 10 mg rectal suppository  -- 1 suppository(ies) rectally once a day, As needed, Constipation  -- Indication: For Constipation

## 2018-10-18 NOTE — MEDICAL STUDENT PROGRESS NOTE(EDUCATION) - NS MD HP STUD ASPLAN ASSES FT
ANITA is a 67 yo woman with a history of polymyositis, chronic left foot ulcer, and a hematoma on her left calf, who presented to the ED 1 week ago with progressive left leg weakness since a fall 2 weeks prior, whose hospital course has been complicated by sepsis, bleeding, MAI, and perforated sigmoid diverticulitis. The patient has refused surgical repair of her colon and is now experiencing abdominal pain, likely due to bowel perforation, and lower limb pain, likely due to cellulitis and ulceration of the LLE. She is now in the PCU for symptom-directed care. ANITA is a 67 yo woman with a history of polymyositis, chronic left foot ulcer, and a hematoma on her left calf, who presented to the ED 1 week ago with progressive left leg weakness since a fall 2 weeks prior, whose hospital course has been complicated by sepsis, bleeding, MAI, and perforated sigmoid diverticulitis. The patient has refused surgical repair of her colon and is now experiencing abdominal pain, likely due to bowel perforation, and lower limb pain, likely due to cellulitis and ulceration of the LLE. She is now in the PCU for symptom-directed care. She had been transitioned from IV to oral Dilaudid yesterday 10/17 in preparation for discharge but was unable to tolerate the oral med.

## 2018-10-18 NOTE — PROGRESS NOTE ADULT - SUBJECTIVE AND OBJECTIVE BOX
GAP TEAM PALLIATIVE CARE UNIT PROGRESS NOTE:      [  ] Patient on hospice program.    INDICATION FOR PALLIATIVE CARE UNIT SERVICES:  Comfort care and symptom management     INTERVAL HPI/OVERNIGHT EVENTS:  c/o pain  L sided abd pain, positional, multiple points of pain. + nausea and vomiting with PO meds, not tolerating Po intake    DNR on chart: Yes  Allergies  penicillins (Unknown)    Intolerances    MEDICATIONS  (STANDING):  glycopyrrolate Injectable 0.4 milliGRAM(s) IV Push every 6 hours  hydrocortisone sodium succinate Injectable 50 milliGRAM(s) IV Push every 12 hours  ondansetron Injectable 4 milliGRAM(s) IV Push every 6 hours  scopolamine   Patch 1 Patch Transdermal every 72 hours    MEDICATIONS  (PRN):  acetaminophen  Suppository .. 650 milliGRAM(s) Rectal every 6 hours PRN Temp greater or equal to 38C (100.4F)  bisacodyl Suppository 10 milliGRAM(s) Rectal daily PRN Constipation  haloperidol    Injectable 0.5 milliGRAM(s) IV Push every 2 hours PRN agitation  HYDROmorphone  Injectable 2 milliGRAM(s) IV Push every 1 hour PRN dyspnea  HYDROmorphone  Injectable 2 milliGRAM(s) IV Push every 1 hour PRN pain  LORazepam   Injectable 0.5 milliGRAM(s) IV Push every 2 hours PRN anxiety/agitation      ITEMS UNCHECKED ARE NOT PRESENT    PRESENT SYMPTOMS: [ ]Unable to obtain due to poor mentation     Source if other than patient:  [ ]Family   [ ]Team     Pain (Impact on QOL):  debilitating   Location:     leg ulcer, abdominal pain   Minimal acceptable level (0-10 scale): 3/10  Aggravating factors: positioning   Quality: sharp  Radiation:  Severity (0-10 scale): 10/10    Dyspnea:                           [ ]Mild [ ]Moderate [ ]Severe  Anxiety:                             [ ]Mild [ ]Moderate [ ]Severe  Fatigue:                             [ ]Mild [ x]Moderate [ ]Severe  Nausea:                             [ ]Mild [x ]Moderate to Severe  Loss of appetite:                [ ]Mild [x ]Moderate [ ]Severe  Constipation:                     [ ]Mild [ ]Moderate [ ]Severe    PAINAD Score: 	  Other Symptoms:  [ ]All other review of systems negative     Karnofsky Performance Score/Palliative Performance Status Version 2:    30     %          PHYSICAL EXAM:  Vital Signs Last 24 Hrs  T(C): 36.8 (18 Oct 2018 08:22), Max: 36.8 (18 Oct 2018 08:22)  T(F): 98.2 (18 Oct 2018 08:22), Max: 98.2 (18 Oct 2018 08:22)  HR: 108 (18 Oct 2018 08:22) (108 - 108)  BP: 134/81 (18 Oct 2018 08:22) (134/81 - 134/81)  BP(mean): --  RR: 18 (18 Oct 2018 08:22) (18 - 18)  SpO2: 92% (18 Oct 2018 08:22) (92% - 92%)    GENERAL:  [ x]Alert  [x ]Oriented x 3   [ ]Lethargic  [ ]Cachexia  [ ]Unarousable  [ x]Verbal  [ ]Non-Verbal    Behavioral:   [ ] Anxiety  [ ] Delirium [ ] Agitation [ ] Other    HEENT:  [x ]Normal   [ ]Dry mouth   [ ]ET Tube/Trach  [ ]Oral lesions    PULMONARY:   [ ]Clear [ ]Tachypnea  [x ]Audible excessive secretions   [x ]Rhonchi        [ ]Right [ ]Left [x ]Bilateral  [ ]Crackles        [ ]Right [ ]Left [ ]Bilateral  [ ]Wheezing     [ ]Right [ ]Left [ ]Bilateral    CARDIOVASCULAR:    [x ]Regular [ ]Irregular [x ]Tachy  [ ]Raza [ ]Murmur [ ]Other    GASTROINTESTINAL:  [x ]Soft  [x ]Distended   [x ]+hypoactive BS  [ ]Non tender [x ]Tender  [ ]PEG [ ]OGT/ NGT   Last BM: 10/17       GENITOURINARY:  [ ]Normal [x ] Incontinent   [ ]Oliguria/Anuria   [ ]Posey    MUSCULOSKELETAL:   [ ]Normal   [ ]Weakness  [x ]Bed/Wheelchair bound [ ]Edema    NEUROLOGIC:   [ x]No focal deficits  [ ] Cognitive impairment  [ ] Dysphagia [ ]Dysarthria [ ] Paresis [ ]Other     SKIN:   [ ]Normal   [ ]Pressure ulcer(s)  [ ]Rash (x) left LE ulcer     CRITICAL CARE:  [ ] Shock Present  [ ]Septic [ ]Cardiogenic [ ]Neurologic [ ]Hypovolemic  [ ]  Vasopressors [ ]  Inotropes   [ ] Respiratory failure present  [ ] Acute  [ ] Chronic [ ] Hypoxic  [ ] Hypercarbic [ ] Other  [ ] Other organ failure     LABS: no new labs for review     RADIOLOGY & ADDITIONAL STUDIES: no new imaging for review     PROTEIN CALORIE MALNUTRITION: [x ] yes   [ ]no  [x ] PPSV2 < or = 30% [ ] significant weight loss [x ] poor nutritional intake [ ] anasarca [ ] catabolic state   Albumin, Serum: 2.1 g/dL (10-12-18 @ 18:01)   Artificial Nutrition [ ]     REFERRALS:   [ ]Chaplaincy  [ ] Hospice  [ ]Child Life  [ x]Social Work  [ ]Case management [ ]Holistic Therapy [ ] Physical Therapy [ ] Dietary   Goals of Care Document: Goals of Care Conversation - Personal Advance Directive [LORNE Christian M. McAfee] (10-12-18 @ 15:31)

## 2018-10-19 LAB — ACHR BIND AB SER-ACNC: <0.3 NMOL/L — SIGNIFICANT CHANGE UP

## 2018-10-19 PROCEDURE — 99233 SBSQ HOSP IP/OBS HIGH 50: CPT | Mod: GC

## 2018-10-19 RX ORDER — HYDROMORPHONE HYDROCHLORIDE 2 MG/ML
2 INJECTION INTRAMUSCULAR; INTRAVENOUS; SUBCUTANEOUS EVERY 6 HOURS
Qty: 0 | Refills: 0 | Status: DISCONTINUED | OUTPATIENT
Start: 2018-10-19 | End: 2018-10-20

## 2018-10-19 RX ORDER — HYDROMORPHONE HYDROCHLORIDE 2 MG/ML
4 INJECTION INTRAMUSCULAR; INTRAVENOUS; SUBCUTANEOUS
Qty: 0 | Refills: 0 | Status: DISCONTINUED | OUTPATIENT
Start: 2018-10-19 | End: 2018-10-20

## 2018-10-19 RX ADMIN — HYDROMORPHONE HYDROCHLORIDE 2 MILLIGRAM(S): 2 INJECTION INTRAMUSCULAR; INTRAVENOUS; SUBCUTANEOUS at 23:02

## 2018-10-19 RX ADMIN — ROBINUL 0.4 MILLIGRAM(S): 0.2 INJECTION INTRAMUSCULAR; INTRAVENOUS at 05:45

## 2018-10-19 RX ADMIN — SCOPALAMINE 1 PATCH: 1 PATCH, EXTENDED RELEASE TRANSDERMAL at 20:10

## 2018-10-19 RX ADMIN — SCOPALAMINE 1 PATCH: 1 PATCH, EXTENDED RELEASE TRANSDERMAL at 08:18

## 2018-10-19 RX ADMIN — ROBINUL 0.4 MILLIGRAM(S): 0.2 INJECTION INTRAMUSCULAR; INTRAVENOUS at 17:24

## 2018-10-19 RX ADMIN — ONDANSETRON 4 MILLIGRAM(S): 8 TABLET, FILM COATED ORAL at 05:45

## 2018-10-19 RX ADMIN — HYDROMORPHONE HYDROCHLORIDE 2 MILLIGRAM(S): 2 INJECTION INTRAMUSCULAR; INTRAVENOUS; SUBCUTANEOUS at 17:24

## 2018-10-19 RX ADMIN — ONDANSETRON 4 MILLIGRAM(S): 8 TABLET, FILM COATED ORAL at 11:47

## 2018-10-19 RX ADMIN — ONDANSETRON 4 MILLIGRAM(S): 8 TABLET, FILM COATED ORAL at 17:24

## 2018-10-19 RX ADMIN — Medication 50 MILLIGRAM(S): at 05:45

## 2018-10-19 RX ADMIN — Medication 50 MILLIGRAM(S): at 17:24

## 2018-10-19 RX ADMIN — HYDROMORPHONE HYDROCHLORIDE 2 MILLIGRAM(S): 2 INJECTION INTRAMUSCULAR; INTRAVENOUS; SUBCUTANEOUS at 12:07

## 2018-10-19 RX ADMIN — ROBINUL 0.4 MILLIGRAM(S): 0.2 INJECTION INTRAMUSCULAR; INTRAVENOUS at 11:47

## 2018-10-19 RX ADMIN — ONDANSETRON 4 MILLIGRAM(S): 8 TABLET, FILM COATED ORAL at 23:02

## 2018-10-19 RX ADMIN — HYDROMORPHONE HYDROCHLORIDE 2 MILLIGRAM(S): 2 INJECTION INTRAMUSCULAR; INTRAVENOUS; SUBCUTANEOUS at 11:52

## 2018-10-19 RX ADMIN — ROBINUL 0.4 MILLIGRAM(S): 0.2 INJECTION INTRAMUSCULAR; INTRAVENOUS at 23:02

## 2018-10-19 NOTE — GOALS OF CARE CONVERSATION - PERSONAL ADVANCE DIRECTIVE - CONVERSATION DETAILS
No bed available at [facility] at 3 p.m.  Attempted to call pt's friend/PCG Nathan Interiano, no answer, no VM.  Spoke with pt earlier today and advised that transfer could take place over weekend.  No one at bedside at 5 p.m.  Please call N Admissions Office  re: bed availability at [facility].  Please fax dc orders to [facility] () and call report to RN at [facility] () when pt is discharged.  Thank you.

## 2018-10-19 NOTE — PROGRESS NOTE ADULT - PROVIDER SPECIALTY LIST ADULT
Critical Care
Infectious Disease
Internal Medicine
Palliative Care
Podiatry
Internal Medicine

## 2018-10-19 NOTE — PROGRESS NOTE ADULT - REASON FOR ADMISSION
Leg weakness

## 2018-10-19 NOTE — PROGRESS NOTE ADULT - ATTENDING COMMENTS
Patient seen and examined and agree with the above documentation with the following additions:   Patient more lethargic today, nausea and pain improved but still requiring BT doses.  Plan for d/c to inpatient hospice once bed available. Rest of plan as documented above.

## 2018-10-19 NOTE — PROGRESS NOTE ADULT - PROBLEM SELECTOR PLAN 5
Transition back to IV meds - pt did not tolerate po meds  Requires prn medication for pain management pre positioning since her pain is aggravated with positioning.   Hospice referral for in pt  DNR/DNI Transition back to IV meds - pt did not tolerate po meds  Requires prn medication for pain management pre positioning since her pain is aggravated with positioning.   Hospice referral for in pt- likely will be transitioned to [facility] over the weekend   DNR/DNI

## 2018-10-19 NOTE — MEDICAL STUDENT PROGRESS NOTE(EDUCATION) - NS MD HP STUD ASPLAN PLAN FT
Problem/Recommendation - 1:  ·	Problem: Pain.  The patient received 3 breakthrough doses of Dilaudid overnight as pretreatment for positional changes, but experienced nausea/vomiting shortly after ingestion; 24 hour total is 24 mg  Recommendation: transition Dilaudid 8 mg PO q2h PRN back to 2 mg IVP q1H PRN  Hospice evaluation pending. Due to the patient's need for IV medication, inpatient hospice is appropriate.     Problem/Recommendation - 2:  ·	Problem: Nausea/Vomiting. Recommendation: Had 5 episodes of vomiting yesterday; Zofran 4 mg IVP q6h; Ativan 0.5 mg IVP q2h PRN     Problem/Recommendation - 3:  ·	Problem: Sigmoid diverticulitis.  Patient refusing surgery.  Deemed to have capacity per Psych.  No longer tolerating PO intake due to nausea/vomiting.     Problem/Recommendation - 4:  ·	Problem: Hemorrhagic shock.  Recommendation: Required transfusions from low H/H due to chronic left leg ulcer.      Problem/Recommendation - 5:  ·	Problem: Left foot ulcer.  Recommendation: Podiatry recommending dressing changes daily       Problem/Recommendation - 6:  ·	Problem: Debility.  Recommendation: Inpatient hospice  PPS Level 30% Problem/Recommendation - 1:  ·	Problem: Pain.  The patient received 1 breakthrough dose of Dilaudid overnight as pretreatment for positional change; 24 hour total is 4 mg  Recommendation: c/w 2 mg IVP q1H PRN  Hospice evaluation pending. Due to the patient's need for IV medication, inpatient hospice is appropriate.     Problem/Recommendation - 2:  ·	Problem: Nausea/Vomiting. Recommendation: Received 4 breakthrough doses of Zofran 4 mg IVP q6h due to nausea  Ativan 0.5 mg IVP q2h PRN     Problem/Recommendation - 3:  Problem: Cough.  Recommendation: Tesalon pearls     Problem/Recommendation - 4:  Problem: Dyspnea.  Recommendation: c/w 2 mg IVP q1H PRN     Problem/Recommendation - 5:  ·	Problem: Sigmoid diverticulitis.  Patient refusing surgery.  Deemed to have capacity per Psych.  No longer tolerating PO intake due to nausea/vomiting.     Problem/Recommendation - 6:  ·	Problem: Hemorrhagic shock.  Recommendation: Required transfusions from low H/H due to chronic left leg ulcer.      Problem/Recommendation - 7:  ·	Problem: Left foot ulcer.  Recommendation: Podiatry recommending dressing changes daily       Problem/Recommendation - 8:  ·	Problem: Debility.  Recommendation: Inpatient hospice  PPS Level 30% Problem/Recommendation - 1:  ·	Problem: Pain.  The patient received 1 breakthrough dose of Dilaudid overnight as pretreatment for positional change; 24 hour total is 4 mg  Recommendation: c/w 2 mg IVP q1H PRN  Hospice evaluation pending. Due to the patient's need for IV medication, inpatient hospice is appropriate.     Problem/Recommendation - 2:  ·	Problem: Nausea/Vomiting. Recommendation: Received 4 breakthrough doses of Zofran 4 mg IVP q6h due to nausea  Ativan 0.5 mg IVP q2h PRN     Problem/Recommendation - 3:  Problem: Cough.  Recommendation: Tessalon Perle 100 mg PO TID PRN     Problem/Recommendation - 4:  Problem: Dyspnea.  Recommendation: c/w 2 mg IVP q1H PRN     Problem/Recommendation - 5:  ·	Problem: Sigmoid diverticulitis.  Patient refusing surgery.  Deemed to have capacity per Psych.  No longer tolerating PO intake due to nausea/vomiting.     Problem/Recommendation - 6:  ·	Problem: Hemorrhagic shock.  Recommendation: Required transfusions from low H/H due to chronic left leg ulcer.      Problem/Recommendation - 7:  ·	Problem: Left foot ulcer.  Recommendation: Podiatry recommending dressing changes daily       Problem/Recommendation - 8:  ·	Problem: Debility.  Recommendation: Inpatient hospice  PPS Level 30% Problem/Recommendation - 1:  ·	Problem: Pain.  The patient received 1 breakthrough dose of Dilaudid overnight as pretreatment for positional change; 24 hour total is 4 mg  Recommendation: c/w 2 mg IVP q1H PRN  Approved for inpatient hospice, now awaiting bed. Due to the patient's need for IV medication, inpatient hospice is appropriate.     Problem/Recommendation - 2:  ·	Problem: Nausea/Vomiting. Recommendation: Received 4 breakthrough doses of Zofran 4 mg IVP q6h due to nausea  Ativan 0.5 mg IVP q2h PRN     Problem/Recommendation - 3:  Problem: Cough.  Recommendation: Tessalon Perle 100 mg PO TID PRN     Problem/Recommendation - 4:  Problem: Dyspnea.  Recommendation: c/w Dilaudid 2 mg IVP q1H PRN     Problem/Recommendation - 5:  ·	Problem: Sigmoid diverticulitis.  Patient refusing surgery.  Deemed to have capacity per Psych.  No longer tolerating PO intake due to nausea/vomiting.     Problem/Recommendation - 6:  ·	Problem: Hemorrhagic shock.  Recommendation: Required transfusions from low H/H due to chronic left leg ulcer.      Problem/Recommendation - 7:  ·	Problem: Left foot ulcer.  Recommendation: Podiatry recommending dressing changes daily       Problem/Recommendation - 8:  ·	Problem: Debility.  Recommendation: Inpatient hospice  PPS Level 30%

## 2018-10-19 NOTE — MEDICAL STUDENT PROGRESS NOTE(EDUCATION) - NS MD HP STUD ASPLAN ASSES FT
ANITA is a 67 yo woman with a history of polymyositis, chronic left foot ulcer, and a hematoma on her left calf, who presented to the ED 1 week ago with progressive left leg weakness since a fall 2 weeks prior, whose hospital course has been complicated by sepsis, bleeding, MAI, and perforated sigmoid diverticulitis. The patient has refused surgical repair of her colon and is now experiencing abdominal pain, likely due to bowel perforation, and lower limb pain, likely due to cellulitis and ulceration of the LLE. She is now in the PCU for symptom-directed care. She had been transitioned from IV to oral Dilaudid yesterday 10/17 in preparation for discharge but was unable to tolerate the oral med. ANITA is a 67 yo woman with a history of polymyositis, chronic left foot ulcer, and a hematoma on her left calf, who presented to the ED 1 week ago with progressive left leg weakness since a fall 2 weeks prior, whose hospital course has been complicated by sepsis, bleeding, MAI, and perforated sigmoid diverticulitis. The patient has refused surgical repair of her colon and is now experiencing abdominal pain, likely due to bowel perforation, and lower limb pain, likely due to cellulitis and ulceration of the LLE. She is now in the PCU for symptom-directed care. She requires IV medications due to inability to tolerate PO medication. ANITA is a 69 yo woman with a history of polymyositis, chronic left foot ulcer, and a hematoma on her left calf, who presented to the ED 1 week ago with progressive left leg weakness since a fall 2 weeks prior, whose hospital course has been complicated by sepsis, bleeding, MAI, and perforated sigmoid diverticulitis. The patient has refused surgical repair of her colon and is now experiencing abdominal pain, likely due to bowel perforation, and lower limb pain, likely due to cellulitis and ulceration of the LLE. She is now in the PCU for symptom-directed care. She requires IV medication due to inability to tolerate PO medication.

## 2018-10-19 NOTE — PROGRESS NOTE ADULT - PROBLEM SELECTOR PLAN 4
Abdominal pain and left LE ulcer pain peaking  transitioned to  Dilaudid 2mg Q1H PRN IVP Abdominal pain and left LE ulcer pain peaking  Dilaudid 2 mg Q6H ATC   Dilaudid 4 mg Q1H PRN for pain

## 2018-10-19 NOTE — PROGRESS NOTE ADULT - SUBJECTIVE AND OBJECTIVE BOX
GAP TEAM PALLIATIVE CARE UNIT PROGRESS NOTE:      [  ] Patient on hospice program.    INDICATION FOR PALLIATIVE CARE UNIT SERVICES:  Comfort care and symptom management     INTERVAL HPI/OVERNIGHT EVENTS:  c/o pain  L sided abd pain, positional, multiple points of pain. + nausea and vomiting with PO meds, not tolerating Po intake    DNR on chart: Yes  Allergies  penicillins (Unknown)    Intolerances    MEDICATIONS  (STANDING):  glycopyrrolate Injectable 0.4 milliGRAM(s) IV Push every 6 hours  hydrocortisone sodium succinate Injectable 50 milliGRAM(s) IV Push every 12 hours  ondansetron Injectable 4 milliGRAM(s) IV Push every 6 hours  scopolamine   Patch 1 Patch Transdermal every 72 hours    MEDICATIONS  (PRN):  acetaminophen  Suppository .. 650 milliGRAM(s) Rectal every 6 hours PRN Temp greater or equal to 38C (100.4F)  bisacodyl Suppository 10 milliGRAM(s) Rectal daily PRN Constipation  haloperidol    Injectable 0.5 milliGRAM(s) IV Push every 2 hours PRN agitation  HYDROmorphone  Injectable 2 milliGRAM(s) IV Push every 1 hour PRN dyspnea  HYDROmorphone  Injectable 2 milliGRAM(s) IV Push every 1 hour PRN pain  LORazepam   Injectable 0.5 milliGRAM(s) IV Push every 2 hours PRN anxiety/agitation      ITEMS UNCHECKED ARE NOT PRESENT    PRESENT SYMPTOMS: [ ]Unable to obtain due to poor mentation     Source if other than patient:  [ ]Family   [ ]Team     Pain (Impact on QOL):  debilitating   Location:     leg ulcer, abdominal pain   Minimal acceptable level (0-10 scale): 3/10  Aggravating factors: positioning   Quality: sharp  Radiation:  Severity (0-10 scale): 10/10    Dyspnea:                           [ ]Mild [ ]Moderate [ ]Severe  Anxiety:                             [ ]Mild [ ]Moderate [ ]Severe  Fatigue:                             [ ]Mild [ x]Moderate [ ]Severe  Nausea:                             [ ]Mild [x ]Moderate to Severe  Loss of appetite:                [ ]Mild [x ]Moderate [ ]Severe  Constipation:                     [ ]Mild [ ]Moderate [ ]Severe    PAINAD Score: 	  Other Symptoms:  [ ]All other review of systems negative     Karnofsky Performance Score/Palliative Performance Status Version 2:    30     %          PHYSICAL EXAM:  Vital Signs Last 24 Hrs  T(C): 36.8 (18 Oct 2018 08:22), Max: 36.8 (18 Oct 2018 08:22)  T(F): 98.2 (18 Oct 2018 08:22), Max: 98.2 (18 Oct 2018 08:22)  HR: 108 (18 Oct 2018 08:22) (108 - 108)  BP: 134/81 (18 Oct 2018 08:22) (134/81 - 134/81)  BP(mean): --  RR: 18 (18 Oct 2018 08:22) (18 - 18)  SpO2: 92% (18 Oct 2018 08:22) (92% - 92%)    GENERAL:  [ x]Alert  [x ]Oriented x 3   [ ]Lethargic  [ ]Cachexia  [ ]Unarousable  [ x]Verbal  [ ]Non-Verbal    Behavioral:   [ ] Anxiety  [ ] Delirium [ ] Agitation [ ] Other    HEENT:  [x ]Normal   [ ]Dry mouth   [ ]ET Tube/Trach  [ ]Oral lesions    PULMONARY:   [ ]Clear [ ]Tachypnea  [x ]Audible excessive secretions   [x ]Rhonchi        [ ]Right [ ]Left [x ]Bilateral  [ ]Crackles        [ ]Right [ ]Left [ ]Bilateral  [ ]Wheezing     [ ]Right [ ]Left [ ]Bilateral    CARDIOVASCULAR:    [x ]Regular [ ]Irregular [x ]Tachy  [ ]Raza [ ]Murmur [ ]Other    GASTROINTESTINAL:  [x ]Soft  [x ]Distended   [x ]+hypoactive BS  [ ]Non tender [x ]Tender  [ ]PEG [ ]OGT/ NGT   Last BM: 10/17       GENITOURINARY:  [ ]Normal [x ] Incontinent   [ ]Oliguria/Anuria   [ ]Posey    MUSCULOSKELETAL:   [ ]Normal   [ ]Weakness  [x ]Bed/Wheelchair bound [ ]Edema    NEUROLOGIC:   [ x]No focal deficits  [ ] Cognitive impairment  [ ] Dysphagia [ ]Dysarthria [ ] Paresis [ ]Other     SKIN:   [ ]Normal   [ ]Pressure ulcer(s)  [ ]Rash (x) left LE ulcer     CRITICAL CARE:  [ ] Shock Present  [ ]Septic [ ]Cardiogenic [ ]Neurologic [ ]Hypovolemic  [ ]  Vasopressors [ ]  Inotropes   [ ] Respiratory failure present  [ ] Acute  [ ] Chronic [ ] Hypoxic  [ ] Hypercarbic [ ] Other  [ ] Other organ failure     LABS: no new labs for review     RADIOLOGY & ADDITIONAL STUDIES: no new imaging for review     PROTEIN CALORIE MALNUTRITION: [x ] yes   [ ]no  [x ] PPSV2 < or = 30% [ ] significant weight loss [x ] poor nutritional intake [ ] anasarca [ ] catabolic state   Albumin, Serum: 2.1 g/dL (10-12-18 @ 18:01)   Artificial Nutrition [ ]     REFERRALS:   [ ]Chaplaincy  [ ] Hospice  [ ]Child Life  [ x]Social Work  [ ]Case management [ ]Holistic Therapy [ ] Physical Therapy [ ] Dietary   Goals of Care Document: Goals of Care Conversation - Personal Advance Directive [LORNE Christian M. McAfee] (10-12-18 @ 15:31) GAP TEAM PALLIATIVE CARE UNIT PROGRESS NOTE:      [  ] Patient on hospice program.    INDICATION FOR PALLIATIVE CARE UNIT SERVICES:  Comfort care and symptom management     INTERVAL HPI/OVERNIGHT EVENTS:  complained of persistent nausea; no vomiting   secretions     DNR on chart: Yes  Allergies  penicillins (Unknown)    Intolerances    MEDICATIONS  (STANDING):  glycopyrrolate Injectable 0.4 milliGRAM(s) IV Push every 6 hours  hydrocortisone sodium succinate Injectable 50 milliGRAM(s) IV Push every 12 hours  HYDROmorphone  Injectable 2 milliGRAM(s) IV Push every 6 hours  ondansetron Injectable 4 milliGRAM(s) IV Push every 6 hours  scopolamine   Patch 1 Patch Transdermal every 72 hours    MEDICATIONS  (PRN):  acetaminophen  Suppository .. 650 milliGRAM(s) Rectal every 6 hours PRN Temp greater or equal to 38C (100.4F)  benzonatate 100 milliGRAM(s) Oral three times a day PRN Cough  bisacodyl Suppository 10 milliGRAM(s) Rectal daily PRN Constipation  haloperidol    Injectable 0.5 milliGRAM(s) IV Push every 2 hours PRN agitation  HYDROmorphone  Injectable 4 milliGRAM(s) IV Push every 1 hour PRN dyspnea  HYDROmorphone  Injectable 4 milliGRAM(s) IV Push every 1 hour PRN pain  LORazepam   Injectable 0.5 milliGRAM(s) IV Push every 2 hours PRN anxiety/agitation      ITEMS UNCHECKED ARE NOT PRESENT    PRESENT SYMPTOMS: [ ]Unable to obtain due to poor mentation     Source if other than patient:  [ ]Family   [ ]Team     Pain (Impact on QOL):  debilitating   Location:     leg ulcer, abdominal pain   Minimal acceptable level (0-10 scale): 3/10  Aggravating factors: positioning   Quality: sharp  Radiation:  Severity (0-10 scale): 10/10    Dyspnea:                           [ ]Mild [ ]Moderate [ ]Severe  Anxiety:                             [ ]Mild [ ]Moderate [ ]Severe  Fatigue:                             [ ]Mild [ x]Moderate [ ]Severe  Nausea:                             [ ]Mild [x ]Moderate to Severe  Loss of appetite:                [ ]Mild [x ]Moderate [ ]Severe  Constipation:                     [ ]Mild [ ]Moderate [ ]Severe    PAINAD Score: 	  Other Symptoms:  [ ]All other review of systems negative     Karnofsky Performance Score/Palliative Performance Status Version 2:    30     %          PHYSICAL EXAM:  Vital Signs Last 24 Hrs  T(C): 37.2 (19 Oct 2018 07:11), Max: 37.2 (19 Oct 2018 07:11)  T(F): 99 (19 Oct 2018 07:11), Max: 99 (19 Oct 2018 07:11)  HR: 109 (19 Oct 2018 07:11) (109 - 109)  BP: 129/80 (19 Oct 2018 07:11) (129/80 - 129/80)  BP(mean): --  RR: 18 (19 Oct 2018 07:11) (18 - 18)  SpO2: 90% (19 Oct 2018 07:11) (90% - 90%) I&O's Summary    18 Oct 2018 07:01  -  19 Oct 2018 07:00  --------------------------------------------------------  IN: 200 mL / OUT: 550 mL / NET: -350 mL      GENERAL:  [ x]Alert  [x ]Oriented x 3   [ ]Lethargic  [ ]Cachexia  [ ]Unarousable  [ x]Verbal  [ ]Non-Verbal    Behavioral:   [ ] Anxiety  [ ] Delirium [ ] Agitation [ ] Other    HEENT:  [x ]Normal   [ ]Dry mouth   [ ]ET Tube/Trach  [ ]Oral lesions    PULMONARY:   [ ]Clear [ ]Tachypnea  [x ]Audible excessive secretions   [x ]Rhonchi        [ ]Right [ ]Left [x ]Bilateral  [ ]Crackles        [ ]Right [ ]Left [ ]Bilateral  [ ]Wheezing     [ ]Right [ ]Left [ ]Bilateral    CARDIOVASCULAR:    [x ]Regular [ ]Irregular [x ]Tachy  [ ]Raza [ ]Murmur [ ]Other    GASTROINTESTINAL:  [x ]Soft  [x ]Distended   [x ]+hypoactive BS  [ ]Non tender [x ]Tender  [ ]PEG [ ]OGT/ NGT   Last BM: 10/17     GENITOURINARY:  [ ]Normal [x ] Incontinent   [ ]Oliguria/Anuria   [ ]Posey    MUSCULOSKELETAL:   [ ]Normal   [ ]Weakness  [x ]Bed/Wheelchair bound [ ]Edema    NEUROLOGIC:   [ x]No focal deficits  [ ] Cognitive impairment  [ ] Dysphagia [ ]Dysarthria [ ] Paresis [ ]Other     SKIN:   [ ]Normal   [ ]Pressure ulcer(s)  [ ]Rash (x) left LE ulcer     CRITICAL CARE:  [ ] Shock Present  [ ]Septic [ ]Cardiogenic [ ]Neurologic [ ]Hypovolemic  [ ]  Vasopressors [ ]  Inotropes   [ ] Respiratory failure present  [ ] Acute  [ ] Chronic [ ] Hypoxic  [ ] Hypercarbic [ ] Other  [ ] Other organ failure     LABS: no new labs for review     RADIOLOGY & ADDITIONAL STUDIES: no new imaging for review     PROTEIN CALORIE MALNUTRITION: [x ] yes   [ ]no  [x ] PPSV2 < or = 30% [ ] significant weight loss [x ] poor nutritional intake [ ] anasarca [ ] catabolic state   Albumin, Serum: 2.1 g/dL (10-12-18 @ 18:01)   Artificial Nutrition [ ]     REFERRALS:   [ ]Chaplaincy  [ ] Hospice  [ ]Child Life  [ x]Social Work  [ ]Case management [ ]Holistic Therapy [ ] Physical Therapy [ ] Dietary   Goals of Care Document: Goals of Care Conversation - Personal Advance Directive [LORNE Christian M. McAfee] (10-12-18 @ 15:31) GAP TEAM PALLIATIVE CARE UNIT PROGRESS NOTE:      [  ] Patient on hospice program.    INDICATION FOR PALLIATIVE CARE UNIT SERVICES:  Comfort care and symptom management     INTERVAL HPI/OVERNIGHT EVENTS:  complained of persistent nausea; no vomiting   secretions     DNR on chart: Yes  Allergies  penicillins (Unknown)    Intolerances    MEDICATIONS  (STANDING):  glycopyrrolate Injectable 0.4 milliGRAM(s) IV Push every 6 hours  hydrocortisone sodium succinate Injectable 50 milliGRAM(s) IV Push every 12 hours  HYDROmorphone  Injectable 2 milliGRAM(s) IV Push every 6 hours  ondansetron Injectable 4 milliGRAM(s) IV Push every 6 hours  scopolamine   Patch 1 Patch Transdermal every 72 hours    MEDICATIONS  (PRN):  acetaminophen  Suppository .. 650 milliGRAM(s) Rectal every 6 hours PRN Temp greater or equal to 38C (100.4F)  benzonatate 100 milliGRAM(s) Oral three times a day PRN Cough  bisacodyl Suppository 10 milliGRAM(s) Rectal daily PRN Constipation  haloperidol    Injectable 0.5 milliGRAM(s) IV Push every 2 hours PRN agitation  HYDROmorphone  Injectable 4 milliGRAM(s) IV Push every 1 hour PRN dyspnea  HYDROmorphone  Injectable 4 milliGRAM(s) IV Push every 1 hour PRN pain  LORazepam   Injectable 0.5 milliGRAM(s) IV Push every 2 hours PRN anxiety/agitation      ITEMS UNCHECKED ARE NOT PRESENT    PRESENT SYMPTOMS: [ ]Unable to obtain due to poor mentation     Source if other than patient:  [ ]Family   [ ]Team     Pain (Impact on QOL):  debilitating   Location:     leg ulcer, abdominal pain   Minimal acceptable level (0-10 scale): 3/10  Aggravating factors: positioning   Quality: sharp  Radiation:  Severity (0-10 scale): 10/10    Dyspnea:                           [ ]Mild [ ]Moderate [ ]Severe  Anxiety:                             [ ]Mild [ ]Moderate [ ]Severe  Fatigue:                             [ ]Mild [ x]Moderate [ ]Severe  Nausea:                             [ ]Mild [x ]Moderate to Severe  Loss of appetite:                [ ]Mild [x ]Moderate [ ]Severe  Constipation:                     [ ]Mild [ ]Moderate [ ]Severe    PAINAD Score: 	  Other Symptoms:  [x ]All other review of systems negative     Karnofsky Performance Score/Palliative Performance Status Version 2:    30     %          PHYSICAL EXAM:  Vital Signs Last 24 Hrs  T(C): 37.2 (19 Oct 2018 07:11), Max: 37.2 (19 Oct 2018 07:11)  T(F): 99 (19 Oct 2018 07:11), Max: 99 (19 Oct 2018 07:11)  HR: 109 (19 Oct 2018 07:11) (109 - 109)  BP: 129/80 (19 Oct 2018 07:11) (129/80 - 129/80)  BP(mean): --  RR: 18 (19 Oct 2018 07:11) (18 - 18)  SpO2: 90% (19 Oct 2018 07:11) (90% - 90%) I&O's Summary    18 Oct 2018 07:01  -  19 Oct 2018 07:00  --------------------------------------------------------  IN: 200 mL / OUT: 550 mL / NET: -350 mL      GENERAL:  [ x]Alert  [x ]Oriented x 3   [ ]Lethargic  [ ]Cachexia  [ ]Unarousable  [ x]Verbal  [ ]Non-Verbal    Behavioral:   [ ] Anxiety  [ ] Delirium [ ] Agitation [ ] Other    HEENT:  [x ]Normal   [ ]Dry mouth   [ ]ET Tube/Trach  [ ]Oral lesions    PULMONARY:   [ ]Clear [ ]Tachypnea  [x ]Audible excessive secretions   [x ]Rhonchi        [ ]Right [ ]Left [x ]Bilateral  [ ]Crackles        [ ]Right [ ]Left [ ]Bilateral  [ ]Wheezing     [ ]Right [ ]Left [ ]Bilateral    CARDIOVASCULAR:    [x ]Regular [ ]Irregular [x ]Tachy  [ ]Raza [ ]Murmur [ ]Other    GASTROINTESTINAL:  [x ]Soft  [x ]Distended   [x ]+hypoactive BS  [ ]Non tender [x ]Tender  [ ]PEG [ ]OGT/ NGT   Last BM: 10/17     GENITOURINARY:  [ ]Normal [x ] Incontinent   [ ]Oliguria/Anuria   [ ]Posey    MUSCULOSKELETAL:   [ ]Normal   [ ]Weakness  [x ]Bed/Wheelchair bound [ ]Edema    NEUROLOGIC:   [ x]No focal deficits  [ ] Cognitive impairment  [ ] Dysphagia [ ]Dysarthria [ ] Paresis [ ]Other     SKIN:   [ ]Normal   [ ]Pressure ulcer(s)  [ ]Rash (x) left LE ulcer     CRITICAL CARE:  [ ] Shock Present  [ ]Septic [ ]Cardiogenic [ ]Neurologic [ ]Hypovolemic  [ ]  Vasopressors [ ]  Inotropes   [ ] Respiratory failure present  [ ] Acute  [ ] Chronic [ ] Hypoxic  [ ] Hypercarbic [ ] Other  [ ] Other organ failure     LABS: no new labs for review     RADIOLOGY & ADDITIONAL STUDIES: no new imaging for review     PROTEIN CALORIE MALNUTRITION: [x ] yes   [ ]no  [x ] PPSV2 < or = 30% [ ] significant weight loss [x ] poor nutritional intake [ ] anasarca [ ] catabolic state   Albumin, Serum: 2.1 g/dL (10-12-18 @ 18:01)   Artificial Nutrition [ ]     REFERRALS:   [ ]Chaplaincy  [ ] Hospice  [ ]Child Life  [ x]Social Work  [ ]Case management [ ]Holistic Therapy [ ] Physical Therapy [ ] Dietary   Goals of Care Document: Goals of Care Conversation - Personal Advance Directive [LORNE Christian M. McAfee] (10-12-18 @ 15:31)

## 2018-10-19 NOTE — PROGRESS NOTE ADULT - PROBLEM SELECTOR PLAN 3
vomited multiple times overnight 2/2 po intake and po pain meds  C/w zofran 4mg IVP Q6H ATC C/w zofran 4mg IVP Q6H ATC

## 2018-10-19 NOTE — PROGRESS NOTE ADULT - ASSESSMENT
Patient is a 67 y/o female with chronic left leg ulcer with significant hx of polymyositis a/w worsening weakness, excessive bleeding from leg ulcer found to have low h/h and perforated sigmoid diverticuli.  Patient refused ex lap surgery. Psychiatry deemed patient to have capacity. Patient wanted comfort care only at this time. She was transitioned to the palliative care unit for continued care and symptom management.

## 2018-10-20 VITALS
OXYGEN SATURATION: 90 % | TEMPERATURE: 100 F | DIASTOLIC BLOOD PRESSURE: 77 MMHG | SYSTOLIC BLOOD PRESSURE: 114 MMHG | RESPIRATION RATE: 20 BRPM | HEART RATE: 108 BPM

## 2018-10-20 RX ORDER — HYDROMORPHONE HYDROCHLORIDE 2 MG/ML
2 INJECTION INTRAMUSCULAR; INTRAVENOUS; SUBCUTANEOUS
Qty: 0 | Refills: 0 | COMMUNITY
Start: 2018-10-20

## 2018-10-20 RX ORDER — HALOPERIDOL DECANOATE 100 MG/ML
0.5 INJECTION INTRAMUSCULAR
Qty: 0 | Refills: 0 | COMMUNITY
Start: 2018-10-20

## 2018-10-20 RX ORDER — HYDROMORPHONE HYDROCHLORIDE 2 MG/ML
1 INJECTION INTRAMUSCULAR; INTRAVENOUS; SUBCUTANEOUS
Qty: 0 | Refills: 0 | COMMUNITY
Start: 2018-10-20

## 2018-10-20 RX ADMIN — HYDROMORPHONE HYDROCHLORIDE 2 MILLIGRAM(S): 2 INJECTION INTRAMUSCULAR; INTRAVENOUS; SUBCUTANEOUS at 05:14

## 2018-10-20 RX ADMIN — ONDANSETRON 4 MILLIGRAM(S): 8 TABLET, FILM COATED ORAL at 05:14

## 2018-10-20 RX ADMIN — Medication 50 MILLIGRAM(S): at 05:14

## 2018-10-20 RX ADMIN — ONDANSETRON 4 MILLIGRAM(S): 8 TABLET, FILM COATED ORAL at 13:38

## 2018-10-20 RX ADMIN — ROBINUL 0.4 MILLIGRAM(S): 0.2 INJECTION INTRAMUSCULAR; INTRAVENOUS at 05:16

## 2018-10-20 RX ADMIN — HYDROMORPHONE HYDROCHLORIDE 2 MILLIGRAM(S): 2 INJECTION INTRAMUSCULAR; INTRAVENOUS; SUBCUTANEOUS at 13:38

## 2018-10-20 RX ADMIN — ROBINUL 0.4 MILLIGRAM(S): 0.2 INJECTION INTRAMUSCULAR; INTRAVENOUS at 13:38

## 2018-11-11 PROCEDURE — 83930 ASSAY OF BLOOD OSMOLALITY: CPT

## 2018-11-11 PROCEDURE — 86900 BLOOD TYPING SEROLOGIC ABO: CPT

## 2018-11-11 PROCEDURE — 87040 BLOOD CULTURE FOR BACTERIA: CPT

## 2018-11-11 PROCEDURE — 84156 ASSAY OF PROTEIN URINE: CPT

## 2018-11-11 PROCEDURE — 96367 TX/PROPH/DG ADDL SEQ IV INF: CPT

## 2018-11-11 PROCEDURE — 87075 CULTR BACTERIA EXCEPT BLOOD: CPT

## 2018-11-11 PROCEDURE — 71250 CT THORAX DX C-: CPT

## 2018-11-11 PROCEDURE — 86200 CCP ANTIBODY: CPT

## 2018-11-11 PROCEDURE — 84238 ASSAY NONENDOCRINE RECEPTOR: CPT

## 2018-11-11 PROCEDURE — 86850 RBC ANTIBODY SCREEN: CPT

## 2018-11-11 PROCEDURE — 93005 ELECTROCARDIOGRAM TRACING: CPT

## 2018-11-11 PROCEDURE — 82330 ASSAY OF CALCIUM: CPT

## 2018-11-11 PROCEDURE — 83036 HEMOGLOBIN GLYCOSYLATED A1C: CPT

## 2018-11-11 PROCEDURE — 85610 PROTHROMBIN TIME: CPT

## 2018-11-11 PROCEDURE — 81001 URINALYSIS AUTO W/SCOPE: CPT

## 2018-11-11 PROCEDURE — P9059: CPT

## 2018-11-11 PROCEDURE — 82565 ASSAY OF CREATININE: CPT

## 2018-11-11 PROCEDURE — 86225 DNA ANTIBODY NATIVE: CPT

## 2018-11-11 PROCEDURE — 86038 ANTINUCLEAR ANTIBODIES: CPT

## 2018-11-11 PROCEDURE — 88312 SPECIAL STAINS GROUP 1: CPT

## 2018-11-11 PROCEDURE — 85027 COMPLETE CBC AUTOMATED: CPT

## 2018-11-11 PROCEDURE — 84295 ASSAY OF SERUM SODIUM: CPT

## 2018-11-11 PROCEDURE — 92610 EVALUATE SWALLOWING FUNCTION: CPT

## 2018-11-11 PROCEDURE — 36430 TRANSFUSION BLD/BLD COMPNT: CPT

## 2018-11-11 PROCEDURE — 82550 ASSAY OF CK (CPK): CPT

## 2018-11-11 PROCEDURE — 74176 CT ABD & PELVIS W/O CONTRAST: CPT

## 2018-11-11 PROCEDURE — 80048 BASIC METABOLIC PNL TOTAL CA: CPT

## 2018-11-11 PROCEDURE — 83605 ASSAY OF LACTIC ACID: CPT

## 2018-11-11 PROCEDURE — 73700 CT LOWER EXTREMITY W/O DYE: CPT

## 2018-11-11 PROCEDURE — 85014 HEMATOCRIT: CPT

## 2018-11-11 PROCEDURE — 99285 EMERGENCY DEPT VISIT HI MDM: CPT | Mod: 25

## 2018-11-11 PROCEDURE — 93971 EXTREMITY STUDY: CPT

## 2018-11-11 PROCEDURE — 84300 ASSAY OF URINE SODIUM: CPT

## 2018-11-11 PROCEDURE — 84540 ASSAY OF URINE/UREA-N: CPT

## 2018-11-11 PROCEDURE — 86235 NUCLEAR ANTIGEN ANTIBODY: CPT

## 2018-11-11 PROCEDURE — 96361 HYDRATE IV INFUSION ADD-ON: CPT

## 2018-11-11 PROCEDURE — 88304 TISSUE EXAM BY PATHOLOGIST: CPT

## 2018-11-11 PROCEDURE — 83935 ASSAY OF URINE OSMOLALITY: CPT

## 2018-11-11 PROCEDURE — 96365 THER/PROPH/DIAG IV INF INIT: CPT

## 2018-11-11 PROCEDURE — 85730 THROMBOPLASTIN TIME PARTIAL: CPT

## 2018-11-11 PROCEDURE — 73630 X-RAY EXAM OF FOOT: CPT

## 2018-11-11 PROCEDURE — 85652 RBC SED RATE AUTOMATED: CPT

## 2018-11-11 PROCEDURE — 82570 ASSAY OF URINE CREATININE: CPT

## 2018-11-11 PROCEDURE — 96375 TX/PRO/DX INJ NEW DRUG ADDON: CPT

## 2018-11-11 PROCEDURE — 84100 ASSAY OF PHOSPHORUS: CPT

## 2018-11-11 PROCEDURE — 82803 BLOOD GASES ANY COMBINATION: CPT

## 2018-11-11 PROCEDURE — 88112 CYTOPATH CELL ENHANCE TECH: CPT

## 2018-11-11 PROCEDURE — 80202 ASSAY OF VANCOMYCIN: CPT

## 2018-11-11 PROCEDURE — 71045 X-RAY EXAM CHEST 1 VIEW: CPT

## 2018-11-11 PROCEDURE — 73590 X-RAY EXAM OF LOWER LEG: CPT

## 2018-11-11 PROCEDURE — 87205 SMEAR GRAM STAIN: CPT

## 2018-11-11 PROCEDURE — 86140 C-REACTIVE PROTEIN: CPT

## 2018-11-11 PROCEDURE — 88305 TISSUE EXAM BY PATHOLOGIST: CPT

## 2018-11-11 PROCEDURE — 87070 CULTURE OTHR SPECIMN AEROBIC: CPT

## 2018-11-11 PROCEDURE — 84132 ASSAY OF SERUM POTASSIUM: CPT

## 2018-11-11 PROCEDURE — 80053 COMPREHEN METABOLIC PANEL: CPT

## 2018-11-11 PROCEDURE — 86901 BLOOD TYPING SEROLOGIC RH(D): CPT

## 2018-11-11 PROCEDURE — 83735 ASSAY OF MAGNESIUM: CPT

## 2018-11-11 PROCEDURE — 82947 ASSAY GLUCOSE BLOOD QUANT: CPT

## 2018-11-11 PROCEDURE — 82435 ASSAY OF BLOOD CHLORIDE: CPT

## 2023-05-24 NOTE — PROGRESS NOTE ADULT - PROBLEM SELECTOR PLAN 1
The patient is a 81y Female complaining of back pain/injury. 2/2 perforated sigmoid diverticulitis. pt with peritonitis, surgical abdomen. pt refusing surgery and is comfort measures only.   -d/c antibiotics and ivf  -tolerating diet  -pain control and anti-emetics only  - Pain control w/ Dilaudid 2 mg Q4H PRN and 1 mg Q2H breakthrough pain 2/2 perforated sigmoid diverticulitis. pt with peritonitis, surgical abdomen. pt refusing surgery and is comfort measures only.   -d/c antibiotics and ivf per patient request  -tolerating diet  -pain control and anti-emetics only  - Pain control w/ Dilaudid 2 mg Q4H PRN and 1 mg Q2H breakthrough pain -2/2 perforated sigmoid diverticulitis. pt with peritonitis, surgical abdomen. pt refusing surgery and is comfort measures only.   -d/c antibiotics and ivf per patient request  -tolerating diet  -pain control and anti-emetics only  - Pain control w/ Dilaudid IV 2 mg Q4H PRN and 1 mg Q2H breakthrough pain.

## 2023-06-05 NOTE — DISCHARGE NOTE ADULT - PATIENT PORTAL LINK FT
You can access the [facility] Patient Portal, offered by [facility], by registering with the following website: [email or web address] Health Services
No indicators present

## 2023-07-24 NOTE — SWALLOW BEDSIDE ASSESSMENT ADULT - SWALLOW EVAL: RECOMMENDED FEEDING/EATING TECHNIQUES
position upright (90 degrees)/maintain upright posture during/after eating for 30 mins
counseling/nicotine replacement therapy

## 2024-01-09 NOTE — PATIENT PROFILE ADULT - HAS THE PATIENT EXPERIENCED ANY OF THE FOLLOWING WITHIN THE WEEK PRIOR TO ADMISSION?
Lab Frequency Next Occurrence   Hemoglobin A1C [E] Once 09/14/2023     Letter mailed to patient reminding them they have outstanding orders.     
no

## 2024-06-08 NOTE — ED ADULT NURSE NOTE - TEMPLATE LIST FOR HEAD TO TOE ASSESSMENT
You were seen in the emergency department today for evaluation after a fainting episode resulting in a dental fracture.  Your CT imaging showed no signs of any other facial fractures as we discussed.  You have been prescribed an antibiotic tablet that you will take twice daily for the next 7 days as well as an oral solution for preventing infection called Peridex that will be used twice daily.  You have also been prescribed a clove oil liquid that you can apply to cotton balls which can be held to the fractured tooth similar to what you use in the emergency department today.  As we discussed, you may take scheduled naproxen if you tolerate that despite your ibuprofen allergy.  You may also take Tylenol and the prescribed oxycodone as needed for breakthrough pain.  You should also call to schedule follow-up with your primary care physician given your recent diagnosis of COVID-19 and for follow-up after having had this fainting spell. Please return to an emergency department for further evaluation if you experience severe headache, difficulty walking, changes in your speech, muscle weakness, or other new and concerning symptom.   General